# Patient Record
Sex: FEMALE | Race: WHITE | NOT HISPANIC OR LATINO | Employment: FULL TIME | URBAN - METROPOLITAN AREA
[De-identification: names, ages, dates, MRNs, and addresses within clinical notes are randomized per-mention and may not be internally consistent; named-entity substitution may affect disease eponyms.]

---

## 2017-01-24 RX ORDER — ALPRAZOLAM 0.5 MG/1
TABLET ORAL
Qty: 30 TABLET | Refills: 1 | OUTPATIENT
Start: 2017-01-24

## 2017-01-26 ENCOUNTER — OFFICE VISIT (OUTPATIENT)
Dept: FAMILY MEDICINE CLINIC | Facility: CLINIC | Age: 47
End: 2017-01-26

## 2017-01-26 VITALS
BODY MASS INDEX: 29.57 KG/M2 | WEIGHT: 184 LBS | OXYGEN SATURATION: 96 % | SYSTOLIC BLOOD PRESSURE: 110 MMHG | HEART RATE: 74 BPM | HEIGHT: 66 IN | DIASTOLIC BLOOD PRESSURE: 70 MMHG

## 2017-01-26 DIAGNOSIS — R25.1 TREMOR: Primary | ICD-10-CM

## 2017-01-26 PROCEDURE — 99213 OFFICE O/P EST LOW 20 MIN: CPT | Performed by: PHYSICIAN ASSISTANT

## 2017-01-26 RX ORDER — ESCITALOPRAM OXALATE 10 MG/1
10 TABLET ORAL DAILY
Qty: 30 TABLET | Refills: 0 | Status: SHIPPED | OUTPATIENT
Start: 2017-01-26 | End: 2017-02-19 | Stop reason: SDUPTHER

## 2017-01-26 NOTE — PROGRESS NOTES
Subjective   Eugenia Naik is a 46 y.o. female    History of Present Illness  Patient is a 46-year-old white female comes in for follow-up of essential tremors, she has been taking Prozac, Pridome, for tremors which is works for the last couple of years, most recently her tremors have gotten worse she states that she's noticed her head shaking more than normal.  She taking Xanax at night which has helped also for tremors.  She would like to change her Prozac which she states is not working as well as past and seems like the discussed things to make in her tremors worse she would like to try different SSRI.  Denies any SI/HI she is increased moodiness but Prozac seems to be making symptoms worse.  She does more head shakes in the morning and when she gets stressed, chronic problem that is not stable    The following portions of the patient's history were reviewed and updated as appropriate: allergies, current medications, past social history and problem list    Review of Systems   Constitutional: Negative for appetite change, diaphoresis, fatigue and unexpected weight change.   Eyes: Negative for visual disturbance.   Respiratory: Negative for chest tightness and shortness of breath.    Cardiovascular: Negative for chest pain, palpitations and leg swelling.   Gastrointestinal: Negative for diarrhea, nausea and vomiting.   Endocrine: Negative for polydipsia, polyphagia and polyuria.   Skin: Negative for color change.   Neurological: Positive for tremors. Negative for dizziness, weakness, light-headedness and numbness.       Objective     Vitals:    01/26/17 1140   BP: 110/70   Pulse: 74   SpO2: 96%       Physical Exam   Constitutional: She appears well-developed and well-nourished.   Neck: No JVD present.   Cardiovascular: Normal rate, regular rhythm, normal heart sounds, intact distal pulses and normal pulses.    No murmur heard.  Pulmonary/Chest: Effort normal and breath sounds normal. No respiratory distress.    Abdominal: Soft. Bowel sounds are normal. There is no hepatosplenomegaly. There is no tenderness.   Musculoskeletal: She exhibits no edema.   Skin: Skin is warm and dry.   Nursing note and vitals reviewed.      Assessment/Plan     Diagnoses and all orders for this visit:    Tremor  -     escitalopram (LEXAPRO) 10 MG tablet; Take 1 tablet by mouth Daily.     #1 DC Prozac    Start Lexapro 10 mg 1 by mouth everyday dispense 30×1 refill    She is to follow-up in 2 weeks to recheck    Refill Xanax 0.5 mg 1 by mouth daily at bedtime dispense 30×5 refills

## 2017-01-26 NOTE — MR AVS SNAPSHOT
Eugenia Naik   1/26/2017 11:15 AM   Office Visit    Dept Phone:  [phone]   Encounter #:  [id number]    Provider:  JAIME Garcia   Department:  [facility]                Your Full Care Plan              Today's Medication Changes          These changes are accurate as of: 1/26/17 12:13 PM.  If you have any questions, ask your nurse or doctor.               New Medication(s)Ordered:     escitalopram 10 MG tablet   Commonly known as:  LEXAPRO   Take 1 tablet by mouth Daily.   Started by:  JAIME Garcia         Medication(s)that have changed:     primidone 50 MG tablet   Commonly known as:  MYSOLINE   TAKE ONE TO TWO TABLETS BY MOUTH EVERY NIGHT AT BEDTIME   What changed:  Another medication with the same name was removed. Continue taking this medication, and follow the directions you see here.   Changed by:  Darrell Hunt MD         Stop taking medication(s)listed here:     azelastine 0.1 % nasal spray   Commonly known as:  ASTELIN   Stopped by:  JAIME Garcia           FLUoxetine 40 MG capsule   Commonly known as:  PROzac   Stopped by:  JAIME Garcia           HYDROcodone-acetaminophen 5-325 MG per tablet   Commonly known as:  NORCO   Stopped by:  JAIME Garcia           vitamin D 48407 UNITS capsule capsule   Commonly known as:  ERGOCALCIFEROL   Stopped by:  JAIME Garcia                Where to Get Your Medications      These medications were sent to [facility] [address] [place] [address] [address]  [phone] [facility] [phone] FX  [address] [place] [address]     Phone:  [phone]     escitalopram 10 MG tablet                  Your Updated Medication List          This list is accurate as of: 1/26/17 12:13 PM.  Always use your most recent med list.                ALPRAZolam 0.5 MG tablet   Commonly known as:  XANAX   TAKE ONE TABLET BY MOUTH  "EVERY NIGHT AT BEDTIME       atorvastatin 10 MG tablet   Commonly known as:  LIPITOR   Take 1 tablet by mouth daily.       escitalopram 10 MG tablet   Commonly known as:  LEXAPRO   Take 1 tablet by mouth Daily.       meclizine 25 MG tablet   Commonly known as:  ANTIVERT       primidone 50 MG tablet   Commonly known as:  MYSOLINE   TAKE ONE TO TWO TABLETS BY MOUTH EVERY NIGHT AT BEDTIME               Instructions     None    Patient Instructions History      Upcoming Appointments     Visit Type Date Time Department    OFFICE VISIT 1/26/2017 11:15 AM [facility]      [employer] Signup     Our records indicate that you have an active [employer] account.    You can view your After Visit Summary by going to [facility] and logging in with your [employer] username and password.  If you don't have a [employer] username and password but a parent or guardian has access to your record, the parent or guardian should login with their own [employer] username and password and access your record to view the After Visit Summary.    If you have questions, you can email [email or web address] or call [phone] to talk to our [employer] staff.  Remember, [employer] is NOT to be used for urgent needs.  For medical emergencies, dial 911.               Other Info from Your Visit           Allergies     No Known Allergies      Reason for Visit     Tremors     Depression           Vital Signs     Blood Pressure Pulse Height Weight Oxygen Saturation Body Mass Index    110/70 (BP Location: Right arm, Patient Position: Sitting) 74 66\" (167.6 cm) 184 lb (83.5 kg) 96% 29.7 kg/m2    Smoking Status                   Never Smoker             "

## 2017-02-19 DIAGNOSIS — R25.1 TREMOR: ICD-10-CM

## 2017-02-20 RX ORDER — ESCITALOPRAM OXALATE 10 MG/1
TABLET ORAL
Qty: 30 TABLET | Refills: 0 | Status: SHIPPED | OUTPATIENT
Start: 2017-02-20 | End: 2017-03-23 | Stop reason: SDUPTHER

## 2017-03-16 ENCOUNTER — TELEPHONE (OUTPATIENT)
Dept: FAMILY MEDICINE CLINIC | Facility: CLINIC | Age: 47
End: 2017-03-16

## 2017-03-16 NOTE — TELEPHONE ENCOUNTER
Patient said that the Lexapro that was given to help with her Tremors is not helping with her depression. She wanted to know if there was something else that she could take.

## 2017-03-17 RX ORDER — BUPROPION HYDROCHLORIDE 150 MG/1
150 TABLET, EXTENDED RELEASE ORAL 2 TIMES DAILY
Qty: 30 TABLET | Refills: 11 | Status: SHIPPED | OUTPATIENT
Start: 2017-03-17 | End: 2017-12-25 | Stop reason: SDUPTHER

## 2017-03-17 NOTE — TELEPHONE ENCOUNTER
Continue Lexapro, add Wellbutrin  sr  150mg  #30 , notify pt that i have called in, take both meds for 6 weeks , I will see back    Take wellbutrin first thing in AM

## 2017-03-23 DIAGNOSIS — R25.1 TREMOR: ICD-10-CM

## 2017-03-24 RX ORDER — ESCITALOPRAM OXALATE 10 MG/1
TABLET ORAL
Qty: 30 TABLET | Refills: 11 | Status: SHIPPED | OUTPATIENT
Start: 2017-03-24 | End: 2017-05-18 | Stop reason: SDUPTHER

## 2017-05-18 ENCOUNTER — OFFICE VISIT (OUTPATIENT)
Dept: FAMILY MEDICINE CLINIC | Facility: CLINIC | Age: 47
End: 2017-05-18

## 2017-05-18 VITALS
SYSTOLIC BLOOD PRESSURE: 108 MMHG | BODY MASS INDEX: 30.37 KG/M2 | HEART RATE: 91 BPM | OXYGEN SATURATION: 98 % | HEIGHT: 66 IN | DIASTOLIC BLOOD PRESSURE: 78 MMHG | WEIGHT: 189 LBS

## 2017-05-18 DIAGNOSIS — R25.1 TREMOR: Primary | ICD-10-CM

## 2017-05-18 PROCEDURE — 99213 OFFICE O/P EST LOW 20 MIN: CPT | Performed by: PHYSICIAN ASSISTANT

## 2017-05-18 RX ORDER — PROPRANOLOL HCL 60 MG
60 CAPSULE, EXTENDED RELEASE 24HR ORAL DAILY
Qty: 30 CAPSULE | Refills: 11 | Status: SHIPPED | OUTPATIENT
Start: 2017-05-18 | End: 2018-05-24 | Stop reason: SDUPTHER

## 2017-05-18 RX ORDER — ESCITALOPRAM OXALATE 20 MG/1
20 TABLET ORAL DAILY
Qty: 30 TABLET | Refills: 11 | Status: SHIPPED | OUTPATIENT
Start: 2017-05-18 | End: 2018-05-24 | Stop reason: SDUPTHER

## 2017-07-07 DIAGNOSIS — Z00.00 ANNUAL PHYSICAL EXAM: ICD-10-CM

## 2017-07-07 RX ORDER — ATORVASTATIN CALCIUM 10 MG/1
TABLET, FILM COATED ORAL
Qty: 90 TABLET | Refills: 2 | Status: SHIPPED | OUTPATIENT
Start: 2017-07-07 | End: 2018-06-19 | Stop reason: SDUPTHER

## 2017-07-14 RX ORDER — ALPRAZOLAM 0.5 MG/1
TABLET ORAL
Qty: 30 TABLET | Refills: 2 | OUTPATIENT
Start: 2017-07-14 | End: 2019-03-26

## 2017-10-11 ENCOUNTER — OFFICE VISIT (OUTPATIENT)
Dept: FAMILY MEDICINE CLINIC | Facility: CLINIC | Age: 47
End: 2017-10-11

## 2017-10-11 VITALS
RESPIRATION RATE: 14 BRPM | HEIGHT: 66 IN | BODY MASS INDEX: 30.37 KG/M2 | HEART RATE: 64 BPM | SYSTOLIC BLOOD PRESSURE: 104 MMHG | TEMPERATURE: 99.2 F | OXYGEN SATURATION: 98 % | WEIGHT: 189 LBS | DIASTOLIC BLOOD PRESSURE: 74 MMHG

## 2017-10-11 DIAGNOSIS — Z00.00 ROUTINE GENERAL MEDICAL EXAMINATION AT HEALTH CARE FACILITY: Primary | ICD-10-CM

## 2017-10-11 LAB
BILIRUB BLD-MCNC: NEGATIVE MG/DL
CLARITY, POC: CLEAR
COLOR UR: YELLOW
GLUCOSE UR STRIP-MCNC: NEGATIVE MG/DL
KETONES UR QL: NEGATIVE
LEUKOCYTE EST, POC: ABNORMAL
NITRITE UR-MCNC: NEGATIVE MG/ML
PH UR: 6.5 [PH] (ref 5–8)
PROT UR STRIP-MCNC: NEGATIVE MG/DL
RBC # UR STRIP: NEGATIVE /UL
SP GR UR: 1.01 (ref 1–1.03)
UROBILINOGEN UR QL: NORMAL

## 2017-10-11 PROCEDURE — 93000 ELECTROCARDIOGRAM COMPLETE: CPT | Performed by: PHYSICIAN ASSISTANT

## 2017-10-11 PROCEDURE — 81003 URINALYSIS AUTO W/O SCOPE: CPT | Performed by: PHYSICIAN ASSISTANT

## 2017-10-11 PROCEDURE — 99396 PREV VISIT EST AGE 40-64: CPT | Performed by: PHYSICIAN ASSISTANT

## 2017-10-11 NOTE — PROGRESS NOTES
Subjective   Eugenia Naik is a 47 y.o. female    History of Present Illness  Patient is a pleasant 47-year-old white female comes in for preventive medical examination no new problems or complaints does need refill on Xanax for chronic anxiety   The following portions of the patient's history were reviwed as appropriate: allergies, current medications, past social history and problem list    Review of Systems   Constitutional: Negative.    HENT: Negative.    Eyes: Negative.    Respiratory: Negative.    Cardiovascular: Negative.    Gastrointestinal: Negative.    Endocrine: Negative.    Genitourinary: Negative.    Musculoskeletal: Negative.    Skin: Negative.    Allergic/Immunologic: Negative.    Neurological: Negative.    Hematological: Negative.    Psychiatric/Behavioral: Negative.    All other systems reviewed and are negative.      Objective     Vitals:    10/11/17 1501   BP: 104/74   Pulse: 64   Resp: 14   Temp: 99.2 °F (37.3 °C)   SpO2: 98%       Physical Exam   Constitutional: She is oriented to person, place, and time. She appears well-developed and well-nourished.   HENT:   Head: Normocephalic and atraumatic.   Right Ear: External ear normal.   Left Ear: External ear normal.   Nose: Nose normal.   Mouth/Throat: Oropharynx is clear and moist.   Eyes: Conjunctivae and EOM are normal. Pupils are equal, round, and reactive to light.   Neck: Normal range of motion. Neck supple. No JVD present. Carotid bruit is not present. No thyromegaly present.   Cardiovascular: Normal rate, regular rhythm, normal heart sounds and intact distal pulses.    No murmur heard.  Pulmonary/Chest: Effort normal and breath sounds normal.   Abdominal: Soft. Bowel sounds are normal. She exhibits no mass. There is no tenderness.   Musculoskeletal: Normal range of motion. She exhibits no edema.   Lymphadenopathy:     She has no cervical adenopathy.   Neurological: She is alert and oriented to person, place, and time. She has normal  reflexes. No cranial nerve deficit.   Skin: Skin is warm and dry.   Psychiatric: She has a normal mood and affect.   Nursing note and vitals reviewed.      ECG 12 Lead  Date/Time: 10/11/2017 4:47 PM  Performed by: DOMINIQUE CHOPRA  Authorized by: DOMINIQUE CHOPRA   Comparison: not compared with previous ECG   Rhythm: sinus rhythm  Rate: normal  ST Segments: ST segments normal  T Waves: T waves normal  QRS axis: normal  Other: no other findings  Clinical impression: normal ECG  Comments: Follow up needed          Assessment/Plan     Diagnoses and all orders for this visit:    Routine general medical examination at health care facility  -     POCT urinalysis dipstick, automated  -     CBC & Differential  -     TSH  -     Lipid Panel  -     Vitamin D 1,25 Dihydroxy  -     Comprehensive Metabolic Panel  -     Ambulatory Referral For Screening Colonoscopy     refill Xanax 0.5 mg 1 by mouth twice a day when necessary anxiety dispense 60    Preventive medicine discussed, diet, exercise healthy living discussed discussed ways to lose weight such a low calorie low-carb diet along with exercising 3-5 times per week for 30 minutes

## 2017-10-12 ENCOUNTER — APPOINTMENT (OUTPATIENT)
Dept: LAB | Facility: HOSPITAL | Age: 47
End: 2017-10-12

## 2017-10-12 LAB
ALBUMIN SERPL-MCNC: 4.3 G/DL (ref 3.2–4.8)
ALBUMIN/GLOB SERPL: 1.9 G/DL (ref 1.5–2.5)
ALP SERPL-CCNC: 69 U/L (ref 25–100)
ALT SERPL W P-5'-P-CCNC: 15 U/L (ref 7–40)
ANION GAP SERPL CALCULATED.3IONS-SCNC: 6 MMOL/L (ref 3–11)
ARTICHOKE IGE QN: 109 MG/DL (ref 0–130)
AST SERPL-CCNC: 14 U/L (ref 0–33)
BASOPHILS # BLD AUTO: 0.03 10*3/MM3 (ref 0–0.2)
BASOPHILS NFR BLD AUTO: 0.4 % (ref 0–1)
BILIRUB SERPL-MCNC: 0.5 MG/DL (ref 0.3–1.2)
BUN BLD-MCNC: 13 MG/DL (ref 9–23)
BUN/CREAT SERPL: 14.4 (ref 7–25)
CALCIUM SPEC-SCNC: 9.3 MG/DL (ref 8.7–10.4)
CHLORIDE SERPL-SCNC: 105 MMOL/L (ref 99–109)
CHOLEST SERPL-MCNC: 173 MG/DL (ref 0–200)
CO2 SERPL-SCNC: 26 MMOL/L (ref 20–31)
CREAT BLD-MCNC: 0.9 MG/DL (ref 0.6–1.3)
DEPRECATED RDW RBC AUTO: 40.1 FL (ref 37–54)
EOSINOPHIL # BLD AUTO: 0.12 10*3/MM3 (ref 0–0.3)
EOSINOPHIL NFR BLD AUTO: 1.7 % (ref 0–3)
ERYTHROCYTE [DISTWIDTH] IN BLOOD BY AUTOMATED COUNT: 12.5 % (ref 11.3–14.5)
GFR SERPL CREATININE-BSD FRML MDRD: 67 ML/MIN/1.73
GLOBULIN UR ELPH-MCNC: 2.3 GM/DL
GLUCOSE BLD-MCNC: 100 MG/DL (ref 70–100)
HCT VFR BLD AUTO: 41.3 % (ref 34.5–44)
HDLC SERPL-MCNC: 57 MG/DL (ref 40–60)
HGB BLD-MCNC: 13.8 G/DL (ref 11.5–15.5)
IMM GRANULOCYTES # BLD: 0.02 10*3/MM3 (ref 0–0.03)
IMM GRANULOCYTES NFR BLD: 0.3 % (ref 0–0.6)
LYMPHOCYTES # BLD AUTO: 1 10*3/MM3 (ref 0.6–4.8)
LYMPHOCYTES NFR BLD AUTO: 14.2 % (ref 24–44)
MCH RBC QN AUTO: 29.6 PG (ref 27–31)
MCHC RBC AUTO-ENTMCNC: 33.4 G/DL (ref 32–36)
MCV RBC AUTO: 88.4 FL (ref 80–99)
MONOCYTES # BLD AUTO: 0.46 10*3/MM3 (ref 0–1)
MONOCYTES NFR BLD AUTO: 6.6 % (ref 0–12)
NEUTROPHILS # BLD AUTO: 5.39 10*3/MM3 (ref 1.5–8.3)
NEUTROPHILS NFR BLD AUTO: 76.8 % (ref 41–71)
PLATELET # BLD AUTO: 237 10*3/MM3 (ref 150–450)
PMV BLD AUTO: 9.5 FL (ref 6–12)
POTASSIUM BLD-SCNC: 4.2 MMOL/L (ref 3.5–5.5)
PROT SERPL-MCNC: 6.6 G/DL (ref 5.7–8.2)
RBC # BLD AUTO: 4.67 10*6/MM3 (ref 3.89–5.14)
SODIUM BLD-SCNC: 137 MMOL/L (ref 132–146)
TRIGL SERPL-MCNC: 126 MG/DL (ref 0–150)
TSH SERPL DL<=0.05 MIU/L-ACNC: 2.45 MIU/ML (ref 0.35–5.35)
WBC NRBC COR # BLD: 7.02 10*3/MM3 (ref 3.5–10.8)

## 2017-10-12 PROCEDURE — 80053 COMPREHEN METABOLIC PANEL: CPT | Performed by: PHYSICIAN ASSISTANT

## 2017-10-12 PROCEDURE — 85025 COMPLETE CBC W/AUTO DIFF WBC: CPT | Performed by: PHYSICIAN ASSISTANT

## 2017-10-12 PROCEDURE — 80061 LIPID PANEL: CPT | Performed by: PHYSICIAN ASSISTANT

## 2017-10-12 PROCEDURE — 82652 VIT D 1 25-DIHYDROXY: CPT | Performed by: PHYSICIAN ASSISTANT

## 2017-10-12 PROCEDURE — 84443 ASSAY THYROID STIM HORMONE: CPT | Performed by: PHYSICIAN ASSISTANT

## 2017-10-12 PROCEDURE — 36415 COLL VENOUS BLD VENIPUNCTURE: CPT | Performed by: PHYSICIAN ASSISTANT

## 2017-10-16 LAB — 1,25(OH)2D3 SERPL-MCNC: 51.6 PG/ML (ref 19.9–79.3)

## 2017-11-13 ENCOUNTER — TELEPHONE (OUTPATIENT)
Dept: FAMILY MEDICINE CLINIC | Facility: CLINIC | Age: 47
End: 2017-11-13

## 2017-11-13 NOTE — TELEPHONE ENCOUNTER
----- Message from Eugenia Naik sent at 11/12/2017  5:24 PM EST -----  Regarding: Non-Urgent Medical Question  Contact: [phone]  I have an appointment for a colonoscopy and was wondering if it was approved through insurance since I’m 47?  It is for a preventive screening.  Thanks,  Eugenia

## 2017-11-20 ENCOUNTER — OFFICE VISIT (OUTPATIENT)
Dept: FAMILY MEDICINE CLINIC | Facility: CLINIC | Age: 47
End: 2017-11-20

## 2017-11-20 VITALS
DIASTOLIC BLOOD PRESSURE: 74 MMHG | HEIGHT: 66 IN | OXYGEN SATURATION: 99 % | SYSTOLIC BLOOD PRESSURE: 106 MMHG | RESPIRATION RATE: 14 BRPM | HEART RATE: 71 BPM | BODY MASS INDEX: 30.44 KG/M2 | WEIGHT: 189.4 LBS

## 2017-11-20 DIAGNOSIS — M25.561 ACUTE PAIN OF RIGHT KNEE: Primary | ICD-10-CM

## 2017-11-20 PROCEDURE — 99213 OFFICE O/P EST LOW 20 MIN: CPT | Performed by: PHYSICIAN ASSISTANT

## 2017-11-20 NOTE — PROGRESS NOTES
Subjective   Eugenia Naik is a 47 y.o. female    History of Present Illness  Patient 47-year-old white female comes in complaining of right knee pain, states he's not better she's having pain when walking upstairs knee locks or swelling in the pain is 7 out of 10 throbbing pain in left knee pain with movement, pain has strengthening and right leg or knee.      The following portions of the patient's history were reviewed and updated as appropriate: allergies, current medications, past social history and problem list    Review of Systems   Constitutional: Negative for appetite change, diaphoresis, fatigue and unexpected weight change.   Eyes: Negative for visual disturbance.   Respiratory: Negative for cough, chest tightness and shortness of breath.    Cardiovascular: Negative for chest pain, palpitations and leg swelling.   Gastrointestinal: Negative for diarrhea, nausea and vomiting.   Endocrine: Negative for polydipsia, polyphagia and polyuria.   Musculoskeletal: Positive for arthralgias, back pain, gait problem and myalgias.   Skin: Negative for color change and rash.   Neurological: Negative for dizziness, syncope, weakness, light-headedness, numbness and headaches.       Objective     Vitals:    11/20/17 1502   BP: 106/74   Pulse: 71   Resp: 14   SpO2: 99%       Physical Exam   Constitutional: She appears well-developed and well-nourished.   Neck: Neck supple. No JVD present. No thyromegaly present.   Cardiovascular: Normal rate, regular rhythm, normal heart sounds and intact distal pulses.    Pulmonary/Chest: Effort normal and breath sounds normal.   Abdominal: Soft. Bowel sounds are normal.   Musculoskeletal: She exhibits no edema.        Right knee: Tenderness found. Medial joint line, lateral joint line, MCL, LCL and patellar tendon tenderness noted.   Lymphadenopathy:     She has no cervical adenopathy.   Neurological: No sensory deficit.   Skin: Skin is warm and dry. She is not diaphoretic.   Nursing  note and vitals reviewed.      Assessment/Plan     Diagnoses and all orders for this visit:    Acute pain of right knee  -     MRI Knee Right Without Contrast    Continue ibuprofen, for knee pain will follow-up after knee MRI is been completed

## 2017-12-05 ENCOUNTER — HOSPITAL ENCOUNTER (OUTPATIENT)
Dept: MRI IMAGING | Facility: HOSPITAL | Age: 47
Discharge: HOME | End: 2017-12-05
Admitting: PHYSICIAN ASSISTANT

## 2017-12-05 PROCEDURE — 73721 MRI JNT OF LWR EXTRE W/O DYE: CPT

## 2017-12-07 ENCOUNTER — TELEPHONE (OUTPATIENT)
Dept: FAMILY MEDICINE CLINIC | Facility: CLINIC | Age: 47
End: 2017-12-07

## 2017-12-08 ENCOUNTER — TRANSCRIBE ORDERS (OUTPATIENT)
Dept: FAMILY MEDICINE CLINIC | Facility: CLINIC | Age: 47
End: 2017-12-08

## 2017-12-08 ENCOUNTER — TELEPHONE (OUTPATIENT)
Dept: FAMILY MEDICINE CLINIC | Facility: CLINIC | Age: 47
End: 2017-12-08

## 2017-12-08 DIAGNOSIS — G89.29 CHRONIC PAIN OF RIGHT KNEE: Primary | ICD-10-CM

## 2017-12-08 DIAGNOSIS — M25.561 CHRONIC PAIN OF RIGHT KNEE: Primary | ICD-10-CM

## 2017-12-08 NOTE — TELEPHONE ENCOUNTER
Per donna, patient has severe arthritic changes in knee, but no tears. Patient would be beneficial in having a cortisone injection. Patient was notified of results and wants to be referred to Orthopedics

## 2017-12-14 ENCOUNTER — OFFICE VISIT (OUTPATIENT)
Dept: ORTHOPEDIC SURGERY | Facility: CLINIC | Age: 47
End: 2017-12-14

## 2017-12-14 VITALS
HEIGHT: 67 IN | SYSTOLIC BLOOD PRESSURE: 139 MMHG | BODY MASS INDEX: 29.41 KG/M2 | HEART RATE: 74 BPM | DIASTOLIC BLOOD PRESSURE: 81 MMHG | WEIGHT: 187.39 LBS

## 2017-12-14 DIAGNOSIS — M93.261 OSTEOCHONDRITIS DISSECANS OF RIGHT KNEE: ICD-10-CM

## 2017-12-14 DIAGNOSIS — M25.561 RIGHT KNEE PAIN, UNSPECIFIED CHRONICITY: ICD-10-CM

## 2017-12-14 DIAGNOSIS — M17.11 PRIMARY OSTEOARTHRITIS OF RIGHT KNEE: Primary | ICD-10-CM

## 2017-12-14 PROCEDURE — 20610 DRAIN/INJ JOINT/BURSA W/O US: CPT | Performed by: ORTHOPAEDIC SURGERY

## 2017-12-14 PROCEDURE — 99244 OFF/OP CNSLTJ NEW/EST MOD 40: CPT | Performed by: ORTHOPAEDIC SURGERY

## 2017-12-14 RX ORDER — TRIAMCINOLONE ACETONIDE 40 MG/ML
80 INJECTION, SUSPENSION INTRA-ARTICULAR; INTRAMUSCULAR
Status: COMPLETED | OUTPATIENT
Start: 2017-12-14 | End: 2017-12-14

## 2017-12-14 RX ORDER — MELOXICAM 15 MG/1
TABLET ORAL
Qty: 60 TABLET | Refills: 0 | Status: SHIPPED | OUTPATIENT
Start: 2017-12-14 | End: 2018-01-07 | Stop reason: SDUPTHER

## 2017-12-14 RX ORDER — LIDOCAINE HYDROCHLORIDE 10 MG/ML
3 INJECTION, SOLUTION INFILTRATION; PERINEURAL
Status: COMPLETED | OUTPATIENT
Start: 2017-12-14 | End: 2017-12-14

## 2017-12-14 RX ADMIN — LIDOCAINE HYDROCHLORIDE 3 ML: 10 INJECTION, SOLUTION INFILTRATION; PERINEURAL at 14:52

## 2017-12-14 RX ADMIN — TRIAMCINOLONE ACETONIDE 80 MG: 40 INJECTION, SUSPENSION INTRA-ARTICULAR; INTRAMUSCULAR at 14:52

## 2017-12-14 NOTE — PROGRESS NOTES
Procedure   Large Joint Arthrocentesis  Date/Time: 12/14/2017 2:52 PM  Consent given by: patient  Site marked: site marked  Timeout: Immediately prior to procedure a time out was called to verify the correct patient, procedure, equipment, support staff and site/side marked as required   Supporting Documentation  Indications: pain   Procedure Details  Location: knee - R knee  Preparation: Patient was prepped and draped in the usual sterile fashion  Needle size: 22 G  Approach: anterolateral  Medications administered: 3 mL lidocaine 1 %; 80 mg triamcinolone acetonide 40 MG/ML (3 cc Bupivicaine .25% NDC: [id number]; LOT: [id number]; EXP: 05/01/2019)  Patient tolerance: patient tolerated the procedure well with no immediate complications

## 2017-12-14 NOTE — PROGRESS NOTES
Orthopaedic Clinic Note: Knee New Patient    Chief Complaint   Patient presents with   • Right Knee - Pain        HPI    Consult from JAIME Garcia    Eugenia Naik is a 47 y.o. female who presents with right knee pain for 2 month(s). Onset Began after working out which she strained her hamstring muscle.  Afterwards she is had ongoing knee pain that has gotten worse with extended activity.  Twisting squatting standing for prolonged periods of time increases her pain.  She rates a 4/10 on the pain scale.  Elevation of the knee improves the pain.  Previous treatments have included ibuprofen and compression knee sleeve.  Despite several interventions, she is having limitations in daily activities.  She saw her primary care physician who ordered an x-ray and MRI and she was referred here for further evaluation and treatment recommendations.  She has a known history of a osteochondral lesion in the proximal medial tibia which was diagnosed 6 years ago.  Apart from that, she has no prior history of right knee pain or problems.    Past Medical History:   Diagnosis Date   • Acute maxillary sinusitis    • Arthralgia    • Colicky RUQ abdominal pain    • Dizziness and giddiness    • Essential tremor    • History of complications due to general anesthesia    • Lumbago    • Metatarsalgia, right foot    • Torres's neuroma of third interspace of right foot    • Pustule       Past Surgical History:   Procedure Laterality Date   • APPENDECTOMY     •  SECTION     • CHOLECYSTECTOMY     • TONSILLECTOMY        Family History   Problem Relation Age of Onset   • Stroke Mother    • Heart disease Mother    • Cancer Other    • Heart disease Other    • Depression Other    • Heart attack Other    • Depression Child    • Anesthesia problems Other    • Rheum arthritis Other    • Hypertension Other      Social History     Social History   • Marital status:      Spouse name: N/A   • Number of children: N/A   • Years  of education: N/A     Occupational History   • Not on file.     Social History Main Topics   • Smoking status: Never Smoker   • Smokeless tobacco: Never Used   • Alcohol use Yes      Comment: Occ   • Drug use: No   • Sexual activity: Defer     Other Topics Concern   • Not on file     Social History Narrative      Current Outpatient Prescriptions on File Prior to Visit   Medication Sig Dispense Refill   • ALPRAZolam (XANAX) 0.5 MG tablet Take one tablet at bedtime 30 tablet 2   • atorvastatin (LIPITOR) 10 MG tablet TAKE ONE TABLET BY MOUTH DAILY 90 tablet 2   • AZELASTINE HCL NA into each nostril As Needed.     • buPROPion SR (WELLBUTRIN SR) 150 MG 12 hr tablet Take 1 tablet by mouth 2 (Two) Times a Day. 30 tablet 11   • escitalopram (LEXAPRO) 20 MG tablet Take 1 tablet by mouth Daily. 30 tablet 11   • FLUoxetine (PROzac) 40 MG capsule Take 40 mg by mouth Daily.     • meclizine (ANTIVERT) 25 MG tablet Take 25 mg by mouth as needed for dizziness.     • primidone (MYSOLINE) 50 MG tablet Take  mg by mouth Daily.     • propranolol LA (INDERAL LA) 60 MG 24 hr capsule Take 1 capsule by mouth Daily. 30 capsule 11     No current facility-administered medications on file prior to visit.       No Known Allergies     Review of Systems   Constitutional: Positive for activity change.   HENT: Positive for postnasal drip and sinus pressure.    Eyes: Negative.    Respiratory: Negative.    Cardiovascular: Negative.    Gastrointestinal: Negative.    Endocrine: Negative.    Genitourinary: Negative.    Musculoskeletal: Positive for arthralgias (knee pain).        Joint Pain   Skin: Negative.    Allergic/Immunologic: Negative.    Neurological: Negative.    Hematological: Negative.    Psychiatric/Behavioral: Negative.         The following portions of the patient's history were reviewed and updated as appropriate: allergies, current medications, past family history, past medical history, past social history, past surgical history  "and problem list.    Physical Exam  Blood pressure 139/81, pulse 74, height 170 cm (66.93\"), weight 85 kg (187 lb 6.3 oz).    Body mass index is 29.41 kg/(m^2).    GENERAL APPEARANCE: awake, alert & oriented x 3, in no acute distress and well developed, well nourished  PSYCH: normal affect  LUNGS:  breathing nonlabored  EYES: sclera anicteric  CARDIOVASCULAR: palpable dorsalis pedis, palpable posterior tibial bilaterally. Capillary refill less than 2 seconds  EXTREMITIES: no clubbing, cyanosis  GAIT:  Normal            Right Lower Extremity Exam:   ----------  Hip Exam  ----------  FLEXION CONTRACTURE: None  FLEXION: 110 degrees  INTERNAL ROTATION: 20 degrees at 90 degrees of flexion   EXTERNAL ROTATION: 40 degrees at 90 degrees of flexion    PAIN WITH HIP MOTION: no  ----------  Knee Exam  ----------  ALIGNMENT: neutral, no varus or valgus deformity     RANGE OF MOTION:  Normal (0-130 degrees) with no extensor lag or flexion contracture  LIGAMENTOUS STABILITY:   stable to varus and valgus stress at 0 and 30 degrees without any evidence of laxity     STRENGTH:  5/5 knee flexion, extension. 5/5 ankle dorsiflexion and plantarflexion.     PAIN WITH PALPATION: Mild tenderness to palpation about medial patellar tendon and anterior medial knee, denies medial or lateral joint line pain  KNEE EFFUSION: Trace knee effusion  PAIN WITH KNEE ROM: Yes, anteriorly with knee flexion   PATELLAR CREPITUS: No  SPECIAL EXAM FINDINGS:  Negative patellar compression    REFLEXES:  PATELLAR 2+/4  ACHILLES 2+/4    CLONUS: negative  STRAIGHT LEG TEST:   negative    SENSATION TO LIGHT TOUCH:  DEEP PERONEAL/SUPERFICIAL PERONEAL/SURAL/SAPHENOUS/TIBIAL:   intact    EDEMA:  no  ERYTHEMA:  no  WOUNDS/INCISIONS: none, no overlying skin problems.      Left Lower Extremity Exam:   ----------  Hip Exam  ----------  FLEXION CONTRACTURE: None  FLEXION: 110 degrees  INTERNAL ROTATION: 20 degrees at 90 degrees of flexion   EXTERNAL ROTATION: 40 degrees " at 90 degrees of flexion    PAIN WITH HIP MOTION: no  ----------  Knee Exam  ----------  ALIGNMENT: neutral, no varus or valgus deformity     RANGE OF MOTION:  Normal (0-130 degrees) with no extensor lag or flexion contracture  LIGAMENTOUS STABILITY:   stable to varus and valgus stress at 0 and 30 degrees without any evidence of laxity     STRENGTH:  5/5 knee flexion, extension. 5/5 ankle dorsiflexion and plantarflexion.     PAIN WITH PALPATION: denies tenderness to palpation about the knee, denies medial or lateral joint line pain  KNEE EFFUSION: no  PAIN WITH KNEE ROM: no  PATELLAR CREPITUS: no  SPECIAL EXAM FINDINGS:  Negative patellar compression    REFLEXES:  PATELLAR 2+/4  ACHILLES 2+/4    CLONUS: negative  STRAIGHT LEG TEST:   negative    SENSATION TO LIGHT TOUCH:  DEEP PERONEAL/SUPERFICIAL PERONEAL/SURAL/SAPHENOUS/TIBIAL:   intact    EDEMA:  no  ERYTHEMA:  no  WOUNDS/INCISIONS: none, no overlying skin problems.    ______________________________________________________________________  ______________________________________________________________________    RADIOGRAPHIC FINDINGS:   Indication: Right knee pain    Comparison: Today's x-rays were compared to prior MRI from 12/5/17    Right knee(s) 4 views: minimal arthritic findings tricompartmentally.  There is evidence of a well circumscribed subchondral cyst in the medial proximal tibial plateau.  This correlates with the edematous area in the proximal tibia on MRI    MRI demonstrates mild degenerative changes of the medial compartment of the knee.  There is a subchondral cyst within the medial tibial plateau which is well circumscribed and likely chronic based on patient's history.  Medial compartment degeneration is also visualized.      Assessment/Plan:   Diagnosis Plan   1. Primary osteoarthritis of right knee  Large Joint Arthrocentesis    meloxicam (MOBIC) 15 MG tablet   2. Right knee pain, unspecified chronicity  XR Knee 4+ View Right   3.  Osteochondritis dissecans of right knee       Patient's symptoms are likely related to arthritis exacerbation versus worsening of the osteochondritis dissecans lesion.  Patient has had prior relief with ibuprofen.  I recommended prescription oral anti-inflammatory as well as a cortisone injection into the right knee.  If these interventions fail to adequately alleviate the symptoms the patient is experiencing, she may benefit from partial knee arthroplasty.  She expressed understanding and agreed to proceed with the injection and oral anti-inflammatory treatment.  I will see her back in 6 weeks for repeat evaluation.    Procedure Note:  I discussed with the patient the potential benefits of performing a therapeutic injection of the right knee as well as potential risks including but not limited to infection, swelling, pain, bleeding, bruising, nerve/vessel damage, skin color changes, transient elevation in blood glucose levels, and fat atrophy. After informed consent and after the area was prepped with alcohol, ethyl chloride was used to numb the skin. Via the superolateral approach, 3cc of 1% lidocaine, 3cc of 0.25% marcaine and 2 cc of 40mg/ml of Kenalog were injected into the right knee. The patient tolerated the procedure well. There were no complications. A sterile dressing was placed over the injection site.      Steve Mauro MD  12/14/17  3:01 PM

## 2017-12-26 RX ORDER — BUPROPION HYDROCHLORIDE 150 MG/1
TABLET, EXTENDED RELEASE ORAL
Qty: 60 TABLET | Refills: 5 | Status: SHIPPED | OUTPATIENT
Start: 2017-12-26 | End: 2018-06-30 | Stop reason: SDUPTHER

## 2018-01-07 DIAGNOSIS — M17.11 PRIMARY OSTEOARTHRITIS OF RIGHT KNEE: ICD-10-CM

## 2018-01-08 RX ORDER — MELOXICAM 15 MG/1
TABLET ORAL
Qty: 30 TABLET | Refills: 0 | Status: SHIPPED | OUTPATIENT
Start: 2018-01-08 | End: 2018-02-19 | Stop reason: SDUPTHER

## 2018-01-25 ENCOUNTER — OFFICE VISIT (OUTPATIENT)
Dept: ORTHOPEDIC SURGERY | Facility: CLINIC | Age: 48
End: 2018-01-25

## 2018-01-25 VITALS
HEART RATE: 63 BPM | HEIGHT: 66 IN | SYSTOLIC BLOOD PRESSURE: 139 MMHG | BODY MASS INDEX: 30.86 KG/M2 | WEIGHT: 192.02 LBS | DIASTOLIC BLOOD PRESSURE: 82 MMHG

## 2018-01-25 DIAGNOSIS — M17.11 PRIMARY OSTEOARTHRITIS OF RIGHT KNEE: ICD-10-CM

## 2018-01-25 DIAGNOSIS — M93.261 OSTEOCHONDRITIS DISSECANS OF RIGHT KNEE: ICD-10-CM

## 2018-01-25 DIAGNOSIS — M70.50 PES ANSERINE BURSITIS: Primary | ICD-10-CM

## 2018-01-25 PROCEDURE — 99213 OFFICE O/P EST LOW 20 MIN: CPT | Performed by: ORTHOPAEDIC SURGERY

## 2018-01-25 PROCEDURE — 20610 DRAIN/INJ JOINT/BURSA W/O US: CPT | Performed by: ORTHOPAEDIC SURGERY

## 2018-01-25 RX ORDER — TRIAMCINOLONE ACETONIDE 40 MG/ML
40 INJECTION, SUSPENSION INTRA-ARTICULAR; INTRAMUSCULAR
Status: COMPLETED | OUTPATIENT
Start: 2018-01-25 | End: 2018-01-25

## 2018-01-25 RX ORDER — LIDOCAINE HYDROCHLORIDE 10 MG/ML
2 INJECTION, SOLUTION INFILTRATION; PERINEURAL
Status: COMPLETED | OUTPATIENT
Start: 2018-01-25 | End: 2018-01-25

## 2018-01-25 RX ADMIN — TRIAMCINOLONE ACETONIDE 40 MG: 40 INJECTION, SUSPENSION INTRA-ARTICULAR; INTRAMUSCULAR at 15:40

## 2018-01-25 RX ADMIN — LIDOCAINE HYDROCHLORIDE 2 ML: 10 INJECTION, SOLUTION INFILTRATION; PERINEURAL at 15:40

## 2018-01-25 NOTE — PROGRESS NOTES
Procedure   Large Joint Arthrocentesis  Date/Time: 1/25/2018 3:40 PM  Consent given by: patient  Site marked: site marked  Timeout: Immediately prior to procedure a time out was called to verify the correct patient, procedure, equipment, support staff and site/side marked as required   Supporting Documentation  Indications: pain   Procedure Details  Location: knee - R knee  Preparation: Patient was prepped and draped in the usual sterile fashion  Approach: anteromedial  Medications administered: 2 mL lidocaine 1 %; 40 mg triamcinolone acetonide 40 MG/ML (2cc bupivicaine .25%, [id number], Lot [id number], exp [id number])  Patient tolerance: patient tolerated the procedure well with no immediate complications

## 2018-01-25 NOTE — PROGRESS NOTES
Orthopaedic Clinic Note: Knee Established Patient    Chief Complaint   Patient presents with   • Follow-up     6 weeks- Primary osteoarthritis of right knee        HPI    It has been 6  week(s) since Ms. Naik's last visit. She returns to clinic today for Follow-up of right knee pain.  She's been having some global pain localized to the right knee.  Previous MRI demonstrated osteochondritis dissecans lesion.  She received intra-articular steroid 6 weeks ago which improved her knee posterior knee pain significantly.  She is now having residual anterior medial proximal tibia pain overlying the pes bursa which she rates a 3/10 on the pain scale today.  Overall she would say her symptom improvement is roughly 40%.  She is angling with no assistive device today.  She continues take oral anti-inflammatories as needed.  She denies any fevers, chills, constitutional symptoms.  Past Medical History:   Diagnosis Date   • Acute maxillary sinusitis    • Arthralgia    • Colicky RUQ abdominal pain    • Dizziness and giddiness    • Essential tremor    • History of complications due to general anesthesia    • Lumbago    • Metatarsalgia, right foot    • Torres's neuroma of third interspace of right foot    • Pustule       Past Surgical History:   Procedure Laterality Date   • APPENDECTOMY     •  SECTION     • CHOLECYSTECTOMY     • TONSILLECTOMY        Family History   Problem Relation Age of Onset   • Stroke Mother    • Heart disease Mother    • Cancer Other    • Heart disease Other    • Depression Other    • Heart attack Other    • Depression Child    • Anesthesia problems Other    • Rheum arthritis Other    • Hypertension Other      Social History     Social History   • Marital status:      Spouse name: N/A   • Number of children: N/A   • Years of education: N/A     Occupational History   • Not on file.     Social History Main Topics   • Smoking status: Never Smoker   • Smokeless tobacco: Never Used   • Alcohol  "use Yes      Comment: Occ   • Drug use: No   • Sexual activity: Defer     Other Topics Concern   • Not on file     Social History Narrative      Current Outpatient Prescriptions on File Prior to Visit   Medication Sig Dispense Refill   • ALPRAZolam (XANAX) 0.5 MG tablet Take one tablet at bedtime 30 tablet 2   • atorvastatin (LIPITOR) 10 MG tablet TAKE ONE TABLET BY MOUTH DAILY 90 tablet 2   • AZELASTINE HCL NA into each nostril As Needed.     • buPROPion SR (WELLBUTRIN SR) 150 MG 12 hr tablet TAKE ONE TABLET BY MOUTH TWICE A DAY 60 tablet 5   • escitalopram (LEXAPRO) 20 MG tablet Take 1 tablet by mouth Daily. 30 tablet 11   • FLUoxetine (PROzac) 40 MG capsule Take 40 mg by mouth Daily.     • meclizine (ANTIVERT) 25 MG tablet Take 25 mg by mouth as needed for dizziness.     • meloxicam (MOBIC) 15 MG tablet TAKE ONE TABLET BY MOUTH DAILY WITH FOOD 30 tablet 0   • primidone (MYSOLINE) 50 MG tablet Take  mg by mouth Daily.     • propranolol LA (INDERAL LA) 60 MG 24 hr capsule Take 1 capsule by mouth Daily. 30 capsule 11     No current facility-administered medications on file prior to visit.       No Known Allergies     Review of Systems     Physical Exam  Blood pressure 139/82, pulse 63, height 168.5 cm (66.34\"), weight 87.1 kg (192 lb 0.3 oz).    Body mass index is 30.68 kg/(m^2).    GENERAL APPEARANCE: awake, alert, oriented, in no acute distress  LUNGS:  breathing nonlabored  EXTREMITIES: no clubbing, cyanosis  PERIPHERAL PULSES: palpable dorsalis pedis and posterior tibial pulses bilaterally.    GAIT:  Normal        ----------  Right Knee Exam:  ----------  ALIGNMENT: neutral  ----------  RANGE OF MOTION:  Normal (0-130 degrees) with no extensor lag or flexion contracture  LIGAMENTOUS STABILITY:   stable to varus and valgus stress at terminal extension and 30 degrees without any evidence of laxity  ----------  STRENGTH:  KNEE FLEXION 5/5  KNEE EXTENSION  5/5  ANKLE DORSIFLEXION  5/5  ANKLE PLANTARFLEXION  " 5/5  ----------  PAIN WITH PALPATION:medial joint line and pes bursa  KNEE EFFUSION: no  PAIN WITH KNEE ROM: yes, in pes bursa region   PATELLAR CREPITUS:  no  ----------  SENSATION TO LIGHT TOUCH:  DEEP PERONEAL/SUPERFICIAL PERONEAL/SURAL/SAPHENOUS/TIBIAL:    intact  ----------  EDEMA:  no  ERYTHEMA:    no  WOUNDS/INCISIONS:  no  _____________________________________________________________________  _____________________________________________________________________    RADIOGRAPHIC FINDINGS:   No new imaging today    Assessment/Plan:   Diagnosis Plan   1. Pes anserine bursitis  Large Joint Arthrocentesis   2. Primary osteoarthritis of right knee     3. Osteochondritis dissecans of right knee       Patient's intra-articular inflammation appears to resolve with oral anti-inflammatory as well as a prior intra-articular cortisone injection.  She has symptoms primarily today localized to the pes bursa which is an extra-articular structure.  I explained that an injection in this area could alleviate the symptoms.  She is agreeable to this plan.  I'll see her back in 6 weeks for repeat assessment.    Procedure Note:  I discussed with the patient the potential benefits of performing a therapeutic injection of the right knee pes bursa as well as potential risks including but not limited to infection, swelling, pain, bleeding, bruising, nerve/vessel damage, skin color changes, transient elevation in blood glucose levels, and fat atrophy. After informed consent and after the area was prepped with alcohol, ethyl chloride was used to numb the skin. Via the anteromedial approach, 2 cc of 1% lidocaine, 2 cc of 0.25% marcaine and 1 cc of 40mg/ml of Kenalog were injected into the right knee pes bursa. The patient tolerated the procedure well. There were no complications. A sterile dressing was placed over the injection site.    Patient received a percent pain relief after this injection.    Steve Mauro MD  01/25/18  3:45  PM

## 2018-02-19 DIAGNOSIS — M17.11 PRIMARY OSTEOARTHRITIS OF RIGHT KNEE: ICD-10-CM

## 2018-02-19 RX ORDER — MELOXICAM 15 MG/1
TABLET ORAL
Qty: 30 TABLET | Refills: 0 | Status: SHIPPED | OUTPATIENT
Start: 2018-02-19 | End: 2018-04-06 | Stop reason: SDUPTHER

## 2018-02-27 ENCOUNTER — OFFICE VISIT (OUTPATIENT)
Dept: FAMILY MEDICINE CLINIC | Facility: CLINIC | Age: 48
End: 2018-02-27

## 2018-02-27 ENCOUNTER — HOSPITAL ENCOUNTER (OUTPATIENT)
Dept: CT IMAGING | Facility: HOSPITAL | Age: 48
Discharge: HOME | End: 2018-02-27
Admitting: PHYSICIAN ASSISTANT

## 2018-02-27 VITALS
WEIGHT: 185.6 LBS | RESPIRATION RATE: 14 BRPM | SYSTOLIC BLOOD PRESSURE: 134 MMHG | HEART RATE: 86 BPM | OXYGEN SATURATION: 98 % | DIASTOLIC BLOOD PRESSURE: 84 MMHG | TEMPERATURE: 98.5 F | HEIGHT: 66 IN | BODY MASS INDEX: 29.83 KG/M2

## 2018-02-27 DIAGNOSIS — R10.30 LOWER ABDOMINAL PAIN: Primary | ICD-10-CM

## 2018-02-27 LAB
ALBUMIN SERPL-MCNC: 4.5 G/DL (ref 3.2–4.8)
ALBUMIN/GLOB SERPL: 2 G/DL (ref 1.5–2.5)
ALP SERPL-CCNC: 62 U/L (ref 25–100)
ALT SERPL W P-5'-P-CCNC: 29 U/L (ref 7–40)
ANION GAP SERPL CALCULATED.3IONS-SCNC: 8 MMOL/L (ref 3–11)
AST SERPL-CCNC: 18 U/L (ref 0–33)
BASOPHILS # BLD AUTO: 0.03 10*3/MM3 (ref 0–0.2)
BASOPHILS NFR BLD AUTO: 0.4 % (ref 0–1)
BILIRUB SERPL-MCNC: 0.7 MG/DL (ref 0.3–1.2)
BUN BLD-MCNC: 12 MG/DL (ref 9–23)
BUN/CREAT SERPL: 13.3 (ref 7–25)
CALCIUM SPEC-SCNC: 9.3 MG/DL (ref 8.7–10.4)
CHLORIDE SERPL-SCNC: 103 MMOL/L (ref 99–109)
CO2 SERPL-SCNC: 26 MMOL/L (ref 20–31)
CREAT BLD-MCNC: 0.9 MG/DL (ref 0.6–1.3)
DEPRECATED RDW RBC AUTO: 40.7 FL (ref 37–54)
EOSINOPHIL # BLD AUTO: 0.15 10*3/MM3 (ref 0–0.3)
EOSINOPHIL NFR BLD AUTO: 2.1 % (ref 0–3)
ERYTHROCYTE [DISTWIDTH] IN BLOOD BY AUTOMATED COUNT: 12.4 % (ref 11.3–14.5)
GFR SERPL CREATININE-BSD FRML MDRD: 67 ML/MIN/1.73
GLOBULIN UR ELPH-MCNC: 2.3 GM/DL
GLUCOSE BLD-MCNC: 98 MG/DL (ref 70–100)
HCT VFR BLD AUTO: 43.4 % (ref 34.5–44)
HGB BLD-MCNC: 14.3 G/DL (ref 11.5–15.5)
IMM GRANULOCYTES # BLD: 0.01 10*3/MM3 (ref 0–0.03)
IMM GRANULOCYTES NFR BLD: 0.1 % (ref 0–0.6)
LYMPHOCYTES # BLD AUTO: 1.19 10*3/MM3 (ref 0.6–4.8)
LYMPHOCYTES NFR BLD AUTO: 16.6 % (ref 24–44)
MCH RBC QN AUTO: 29.6 PG (ref 27–31)
MCHC RBC AUTO-ENTMCNC: 32.9 G/DL (ref 32–36)
MCV RBC AUTO: 89.9 FL (ref 80–99)
MONOCYTES # BLD AUTO: 0.49 10*3/MM3 (ref 0–1)
MONOCYTES NFR BLD AUTO: 6.8 % (ref 0–12)
NEUTROPHILS # BLD AUTO: 5.32 10*3/MM3 (ref 1.5–8.3)
NEUTROPHILS NFR BLD AUTO: 74 % (ref 41–71)
PLATELET # BLD AUTO: 254 10*3/MM3 (ref 150–450)
PMV BLD AUTO: 10.1 FL (ref 6–12)
POTASSIUM BLD-SCNC: 4.5 MMOL/L (ref 3.5–5.5)
PROT SERPL-MCNC: 6.8 G/DL (ref 5.7–8.2)
RBC # BLD AUTO: 4.83 10*6/MM3 (ref 3.89–5.14)
SODIUM BLD-SCNC: 137 MMOL/L (ref 132–146)
WBC NRBC COR # BLD: 7.19 10*3/MM3 (ref 3.5–10.8)

## 2018-02-27 PROCEDURE — 85025 COMPLETE CBC W/AUTO DIFF WBC: CPT | Performed by: PHYSICIAN ASSISTANT

## 2018-02-27 PROCEDURE — 74176 CT ABD & PELVIS W/O CONTRAST: CPT

## 2018-02-27 PROCEDURE — 99213 OFFICE O/P EST LOW 20 MIN: CPT | Performed by: PHYSICIAN ASSISTANT

## 2018-02-27 PROCEDURE — 80053 COMPREHEN METABOLIC PANEL: CPT | Performed by: PHYSICIAN ASSISTANT

## 2018-02-27 NOTE — PROGRESS NOTES
Subjective   Eugenia Naik is a 47 y.o. female  Abdominal Pain (Located in RUQ and radiates to back. Intermittent but pain is intense. Not tender to touch. Hurts to bend, twist but feels better when walking.)      History of Present Illness  Patient 47-year-old white female comes in complaining of right upper quadrant pain radiating to groin patient states it started 2 weeks ago and increasingly got worse she states it radiates into groin with 9 out of 10 pain concerned about possible kidney stone she's had her gallbladder removed without febrile.  No fever chills concerned   The following portions of the patient's history were reviewed and updated as appropriate: allergies, current medications, past social history and problem list    Review of Systems   Constitutional: Negative for appetite change, diaphoresis, fatigue, fever and unexpected weight change.   Eyes: Negative for visual disturbance.   Respiratory: Negative for cough, chest tightness and shortness of breath.    Cardiovascular: Negative for chest pain, palpitations and leg swelling.   Gastrointestinal: Positive for abdominal pain and constipation. Negative for diarrhea, nausea and vomiting.   Endocrine: Negative for polydipsia, polyphagia and polyuria.   Genitourinary: Negative for dysuria, frequency and hematuria.   Musculoskeletal: Negative for arthralgias and myalgias.   Skin: Negative for color change and rash.   Neurological: Negative for dizziness, syncope, weakness, light-headedness, numbness and headaches.       Objective     Vitals:    02/27/18 1301   BP: 134/84   Pulse: 86   Resp: 14   Temp: 98.5 °F (36.9 °C)   SpO2: 98%       Physical Exam   Constitutional: She appears well-developed and well-nourished.   Neck: Neck supple. No JVD present. No thyromegaly present.   Cardiovascular: Normal rate, regular rhythm, normal heart sounds, intact distal pulses and normal pulses.    No murmur heard.  Pulmonary/Chest: Effort normal and breath sounds  normal. No respiratory distress.   Abdominal: Soft. Bowel sounds are normal. There is no hepatosplenomegaly. There is no tenderness.   Musculoskeletal: She exhibits no edema.   Lymphadenopathy:     She has no cervical adenopathy.   Neurological: No sensory deficit.   Skin: Skin is warm and dry. She is not diaphoretic.   Nursing note and vitals reviewed.      Assessment/Plan     Diagnoses and all orders for this visit:    Lower abdominal pain  -     CBC & Differential  -     Comprehensive Metabolic Panel  -     CT Abdomen Pelvis Stone Protocol  -     CBC Auto Differential    Set up Ct scan of abdomen and pelvis to rule out renal stone.  Follow-up after CT scan

## 2018-02-28 ENCOUNTER — TELEPHONE (OUTPATIENT)
Dept: FAMILY MEDICINE CLINIC | Facility: CLINIC | Age: 48
End: 2018-02-28

## 2018-02-28 NOTE — TELEPHONE ENCOUNTER
----- Message from Elva Travis sent at 2/28/2018 11:12 AM EST -----  Contact: PT.  PT. HAS LEFT 2 MESSAGE RE. PAIN LEVEL & CT SCAN:  NEXT PLAN OF CARE.  PT. CAN BE REACHED @ #: [phone].

## 2018-02-28 NOTE — TELEPHONE ENCOUNTER
----- Message from Antonieta Sanderson sent at 2/28/2018  8:42 AM EST -----  Contact: PATIENT  SHE HAD MRI YESTERDAY, STILL IN A LOT OF PAIN, WOULD LIKE SOMEONE TO CALL HER WITH RESULTS AND WHAT SHE NEEDS TO DO NEXT. THANK YOU    [phone]

## 2018-03-13 ENCOUNTER — OFFICE VISIT (OUTPATIENT)
Dept: ORTHOPEDIC SURGERY | Facility: CLINIC | Age: 48
End: 2018-03-13

## 2018-03-13 VITALS
WEIGHT: 182.98 LBS | HEART RATE: 67 BPM | SYSTOLIC BLOOD PRESSURE: 142 MMHG | HEIGHT: 66 IN | DIASTOLIC BLOOD PRESSURE: 70 MMHG | BODY MASS INDEX: 29.41 KG/M2

## 2018-03-13 DIAGNOSIS — M93.261 OSTEOCHONDRITIS DISSECANS OF RIGHT KNEE: ICD-10-CM

## 2018-03-13 DIAGNOSIS — M17.11 PRIMARY OSTEOARTHRITIS OF RIGHT KNEE: Primary | ICD-10-CM

## 2018-03-13 PROCEDURE — 99212 OFFICE O/P EST SF 10 MIN: CPT | Performed by: ORTHOPAEDIC SURGERY

## 2018-03-13 NOTE — PROGRESS NOTES
Orthopaedic Clinic Note: Knee Established Patient    Chief Complaint   Patient presents with   • Follow-up     7 weeks - Pes anserine bursitis; Primary osteoarthritis of right knee ; Osteochondritis dissecans of right knee        HPI    It has been 7  week(s) since Ms. Naik's last visit. She returns to clinic today for Follow-up of right knee pain.  She received a cortisone injection into the right knee 3 months ago.  A pes bursa injection proximal to 7 weeks ago.  Overall her pain is significantly improved.  She rates her pain a 1/10 on the pain scale today.  She is ambulate with no assistive device.  She continues take oral meloxicam as needed for pain control.  She denies fevers, chills, constitutional symptoms.  Overall she is doing better.    Past Medical History:   Diagnosis Date   • Acute maxillary sinusitis    • Arthralgia    • Colicky RUQ abdominal pain    • Dizziness and giddiness    • Essential tremor    • History of complications due to general anesthesia    • Lumbago    • Metatarsalgia, right foot    • Torres's neuroma of third interspace of right foot    • Pustule       Past Surgical History:   Procedure Laterality Date   • APPENDECTOMY     •  SECTION     • CHOLECYSTECTOMY     • TONSILLECTOMY        Family History   Problem Relation Age of Onset   • Stroke Mother    • Heart disease Mother    • Cancer Other    • Heart disease Other    • Depression Other    • Heart attack Other    • Depression Child    • Anesthesia problems Other    • Rheum arthritis Other    • Hypertension Other      Social History     Social History   • Marital status:      Spouse name: N/A   • Number of children: N/A   • Years of education: N/A     Occupational History   • Not on file.     Social History Main Topics   • Smoking status: Never Smoker   • Smokeless tobacco: Never Used   • Alcohol use Yes      Comment: Occ   • Drug use: No   • Sexual activity: Defer     Other Topics Concern   • Not on file     Social  "History Narrative   • No narrative on file      Current Outpatient Prescriptions on File Prior to Visit   Medication Sig Dispense Refill   • ALPRAZolam (XANAX) 0.5 MG tablet Take one tablet at bedtime 30 tablet 2   • atorvastatin (LIPITOR) 10 MG tablet TAKE ONE TABLET BY MOUTH DAILY 90 tablet 2   • AZELASTINE HCL NA into each nostril As Needed.     • buPROPion SR (WELLBUTRIN SR) 150 MG 12 hr tablet TAKE ONE TABLET BY MOUTH TWICE A DAY 60 tablet 5   • escitalopram (LEXAPRO) 20 MG tablet Take 1 tablet by mouth Daily. 30 tablet 11   • FLUoxetine (PROzac) 40 MG capsule Take 40 mg by mouth Daily.     • meclizine (ANTIVERT) 25 MG tablet Take 25 mg by mouth as needed for dizziness.     • meloxicam (MOBIC) 15 MG tablet TAKE ONE TABLET BY MOUTH DAILY WITH FOOD 30 tablet 0   • primidone (MYSOLINE) 50 MG tablet Take  mg by mouth Daily.     • propranolol LA (INDERAL LA) 60 MG 24 hr capsule Take 1 capsule by mouth Daily. 30 capsule 11     No current facility-administered medications on file prior to visit.       No Known Allergies     Review of Systems     Physical Exam  Blood pressure 142/70, pulse 67, height 168.5 cm (66.34\"), weight 83 kg (182 lb 15.7 oz).    Body mass index is 29.23 kg/m².    GENERAL APPEARANCE: awake, alert, oriented, in no acute distress  LUNGS:  breathing nonlabored  EXTREMITIES: no clubbing, cyanosis  PERIPHERAL PULSES: palpable dorsalis pedis and posterior tibial pulses bilaterally.    GAIT:  Normal        ----------  Right Knee Exam:  ----------  ALIGNMENT: neutral  ----------  RANGE OF MOTION:  Normal (0-130 degrees) with no extensor lag or flexion contracture  LIGAMENTOUS STABILITY:   stable to varus and valgus stress at terminal extension and 30 degrees without any evidence of laxity  ----------  STRENGTH:  KNEE FLEXION 5/5  KNEE EXTENSION  5/5  ANKLE DORSIFLEXION  5/5  ANKLE PLANTARFLEXION  5/5  ----------  PAIN WITH PALPATION:medial joint line and pes bursa  KNEE EFFUSION: no  PAIN WITH KNEE " ROM: yes, in pes bursa region   PATELLAR CREPITUS:  no  ----------  SENSATION TO LIGHT TOUCH:  DEEP PERONEAL/SUPERFICIAL PERONEAL/SURAL/SAPHENOUS/TIBIAL:    intact  ----------  EDEMA:  no  ERYTHEMA:    no  WOUNDS/INCISIONS:  no  _____________________________________________________________________  _____________________________________________________________________    RADIOGRAPHIC FINDINGS:   No new imaging today    Assessment/Plan:   Diagnosis Plan   1. Primary osteoarthritis of right knee     2. Osteochondritis dissecans of right knee       At this point, she is doing well.  No further intervention is warranted.  She'll follow up on an as-needed basis.    Steve Mauro MD  03/13/18  4:18 PM

## 2018-03-25 ENCOUNTER — APPOINTMENT (OUTPATIENT)
Dept: GENERAL RADIOLOGY | Facility: HOSPITAL | Age: 48
End: 2018-03-25

## 2018-03-25 ENCOUNTER — HOSPITAL ENCOUNTER (EMERGENCY)
Facility: HOSPITAL | Age: 48
Discharge: HOME | End: 2018-03-25
Attending: EMERGENCY MEDICINE | Admitting: EMERGENCY MEDICINE

## 2018-03-25 VITALS
OXYGEN SATURATION: 100 % | HEIGHT: 66 IN | RESPIRATION RATE: 16 BRPM | BODY MASS INDEX: 28.93 KG/M2 | SYSTOLIC BLOOD PRESSURE: 132 MMHG | TEMPERATURE: 99.3 F | DIASTOLIC BLOOD PRESSURE: 82 MMHG | WEIGHT: 180 LBS | HEART RATE: 62 BPM

## 2018-03-25 DIAGNOSIS — R10.9 ABDOMINAL PAIN, UNSPECIFIED ABDOMINAL LOCATION: Primary | ICD-10-CM

## 2018-03-25 LAB
ALBUMIN SERPL-MCNC: 4.1 G/DL (ref 3.2–4.8)
ALBUMIN/GLOB SERPL: 1.9 G/DL (ref 1.5–2.5)
ALP SERPL-CCNC: 69 U/L (ref 25–100)
ALT SERPL W P-5'-P-CCNC: 15 U/L (ref 7–40)
ANION GAP SERPL CALCULATED.3IONS-SCNC: 4 MMOL/L (ref 3–11)
AST SERPL-CCNC: 13 U/L (ref 0–33)
B-HCG UR QL: NEGATIVE
BASOPHILS # BLD AUTO: 0.05 10*3/MM3 (ref 0–0.2)
BASOPHILS NFR BLD AUTO: 0.8 % (ref 0–1)
BILIRUB SERPL-MCNC: 0.5 MG/DL (ref 0.3–1.2)
BILIRUB UR QL STRIP: NEGATIVE
BUN BLD-MCNC: 15 MG/DL (ref 9–23)
BUN/CREAT SERPL: 16.7 (ref 7–25)
CALCIUM SPEC-SCNC: 8.8 MG/DL (ref 8.7–10.4)
CHLORIDE SERPL-SCNC: 105 MMOL/L (ref 99–109)
CLARITY UR: CLEAR
CO2 SERPL-SCNC: 27 MMOL/L (ref 20–31)
COLOR UR: YELLOW
CREAT BLD-MCNC: 0.9 MG/DL (ref 0.6–1.3)
D-LACTATE SERPL-SCNC: 0.9 MMOL/L (ref 0.5–2)
DEPRECATED RDW RBC AUTO: 40 FL (ref 37–54)
EOSINOPHIL # BLD AUTO: 0.09 10*3/MM3 (ref 0–0.3)
EOSINOPHIL NFR BLD AUTO: 1.5 % (ref 0–3)
ERYTHROCYTE [DISTWIDTH] IN BLOOD BY AUTOMATED COUNT: 12.3 % (ref 11.3–14.5)
GFR SERPL CREATININE-BSD FRML MDRD: 67 ML/MIN/1.73
GLOBULIN UR ELPH-MCNC: 2.2 GM/DL
GLUCOSE BLD-MCNC: 93 MG/DL (ref 70–100)
GLUCOSE UR STRIP-MCNC: NEGATIVE MG/DL
HCT VFR BLD AUTO: 41.9 % (ref 34.5–44)
HGB BLD-MCNC: 14.1 G/DL (ref 11.5–15.5)
HGB UR QL STRIP.AUTO: NEGATIVE
HOLD SPECIMEN: NORMAL
HOLD SPECIMEN: NORMAL
IMM GRANULOCYTES # BLD: 0.03 10*3/MM3 (ref 0–0.03)
IMM GRANULOCYTES NFR BLD: 0.5 % (ref 0–0.6)
INTERNAL NEGATIVE CONTROL: NEGATIVE
INTERNAL POSITIVE CONTROL: POSITIVE
KETONES UR QL STRIP: NEGATIVE
LEUKOCYTE ESTERASE UR QL STRIP.AUTO: NEGATIVE
LIPASE SERPL-CCNC: 33 U/L (ref 6–51)
LYMPHOCYTES # BLD AUTO: 0.87 10*3/MM3 (ref 0.6–4.8)
LYMPHOCYTES NFR BLD AUTO: 14.7 % (ref 24–44)
Lab: NORMAL
MCH RBC QN AUTO: 30 PG (ref 27–31)
MCHC RBC AUTO-ENTMCNC: 33.7 G/DL (ref 32–36)
MCV RBC AUTO: 89.1 FL (ref 80–99)
MONOCYTES # BLD AUTO: 0.54 10*3/MM3 (ref 0–1)
MONOCYTES NFR BLD AUTO: 9.2 % (ref 0–12)
NEUTROPHILS # BLD AUTO: 4.32 10*3/MM3 (ref 1.5–8.3)
NEUTROPHILS NFR BLD AUTO: 73.3 % (ref 41–71)
NITRITE UR QL STRIP: NEGATIVE
PH UR STRIP.AUTO: 8.5 [PH] (ref 5–8)
PLATELET # BLD AUTO: 261 10*3/MM3 (ref 150–450)
PMV BLD AUTO: 10 FL (ref 6–12)
POTASSIUM BLD-SCNC: 4.2 MMOL/L (ref 3.5–5.5)
PROT SERPL-MCNC: 6.3 G/DL (ref 5.7–8.2)
PROT UR QL STRIP: NEGATIVE
RBC # BLD AUTO: 4.7 10*6/MM3 (ref 3.89–5.14)
SODIUM BLD-SCNC: 136 MMOL/L (ref 132–146)
SP GR UR STRIP: 1.02 (ref 1–1.03)
TROPONIN I SERPL-MCNC: 0 NG/ML (ref 0–0.07)
UROBILINOGEN UR QL STRIP: ABNORMAL
WBC NRBC COR # BLD: 5.9 10*3/MM3 (ref 3.5–10.8)
WHOLE BLOOD HOLD SPECIMEN: NORMAL
WHOLE BLOOD HOLD SPECIMEN: NORMAL

## 2018-03-25 PROCEDURE — 93005 ELECTROCARDIOGRAM TRACING: CPT | Performed by: EMERGENCY MEDICINE

## 2018-03-25 PROCEDURE — 80053 COMPREHEN METABOLIC PANEL: CPT | Performed by: EMERGENCY MEDICINE

## 2018-03-25 PROCEDURE — 83690 ASSAY OF LIPASE: CPT | Performed by: EMERGENCY MEDICINE

## 2018-03-25 PROCEDURE — 83605 ASSAY OF LACTIC ACID: CPT | Performed by: EMERGENCY MEDICINE

## 2018-03-25 PROCEDURE — 84484 ASSAY OF TROPONIN QUANT: CPT

## 2018-03-25 PROCEDURE — 81003 URINALYSIS AUTO W/O SCOPE: CPT | Performed by: EMERGENCY MEDICINE

## 2018-03-25 PROCEDURE — 74018 RADEX ABDOMEN 1 VIEW: CPT

## 2018-03-25 PROCEDURE — 96374 THER/PROPH/DIAG INJ IV PUSH: CPT

## 2018-03-25 PROCEDURE — 85025 COMPLETE CBC W/AUTO DIFF WBC: CPT | Performed by: EMERGENCY MEDICINE

## 2018-03-25 PROCEDURE — 71045 X-RAY EXAM CHEST 1 VIEW: CPT

## 2018-03-25 PROCEDURE — 25010000002 ONDANSETRON PER 1 MG

## 2018-03-25 PROCEDURE — 99283 EMERGENCY DEPT VISIT LOW MDM: CPT

## 2018-03-25 RX ORDER — PROMETHAZINE HYDROCHLORIDE 25 MG/1
25 TABLET ORAL EVERY 4 HOURS PRN
Status: ON HOLD | COMMUNITY
End: 2018-12-05

## 2018-03-25 RX ORDER — DICYCLOMINE HCL 20 MG
20 TABLET ORAL EVERY 6 HOURS
Qty: 15 TABLET | Refills: 0 | Status: SHIPPED | OUTPATIENT
Start: 2018-03-25 | End: 2018-04-05 | Stop reason: HOSPADM

## 2018-03-25 RX ORDER — ONDANSETRON 2 MG/ML
INJECTION INTRAMUSCULAR; INTRAVENOUS
Status: COMPLETED
Start: 2018-03-25 | End: 2018-03-25

## 2018-03-25 RX ORDER — ONDANSETRON 2 MG/ML
4 INJECTION INTRAMUSCULAR; INTRAVENOUS ONCE
Status: DISCONTINUED | OUTPATIENT
Start: 2018-03-25 | End: 2018-03-25

## 2018-03-25 RX ORDER — OXYCODONE AND ACETAMINOPHEN 10; 325 MG/1; MG/1
1 TABLET ORAL EVERY 6 HOURS PRN
COMMUNITY
End: 2018-11-27

## 2018-03-25 RX ORDER — SODIUM CHLORIDE 0.9 % (FLUSH) 0.9 %
10 SYRINGE (ML) INJECTION AS NEEDED
Status: DISCONTINUED | OUTPATIENT
Start: 2018-03-25 | End: 2018-03-25 | Stop reason: HOSPADM

## 2018-03-25 RX ADMIN — ONDANSETRON 4 MG: 2 INJECTION INTRAMUSCULAR; INTRAVENOUS at 12:17

## 2018-03-25 RX ADMIN — SODIUM CHLORIDE 1000 ML: 9 INJECTION, SOLUTION INTRAVENOUS at 12:16

## 2018-03-28 ENCOUNTER — TELEPHONE (OUTPATIENT)
Dept: FAMILY MEDICINE CLINIC | Facility: CLINIC | Age: 48
End: 2018-03-28

## 2018-03-28 NOTE — TELEPHONE ENCOUNTER
----- Message from Patricia Valenzuela sent at 3/28/2018  4:06 PM EDT -----  Contact: PTS   PT WOULD LIKE A CALL BACK ASAP FROM [facility]...SAYS THIS IS REGARDING ABDOMINAL PAIN SHE HAS BEEN TREATED FOR/ TESTS RUN WITH NO RESOLUTION FOR MONTHS.

## 2018-03-29 ENCOUNTER — TELEPHONE (OUTPATIENT)
Dept: FAMILY MEDICINE CLINIC | Facility: CLINIC | Age: 48
End: 2018-03-29

## 2018-03-29 NOTE — TELEPHONE ENCOUNTER
----- Message from Eugenia Naik sent at 3/28/2018  6:45 PM EDT -----  Regarding: Visit Follow-Up Question  Ady  I have had 2 ct scans, vaginal ultrasound, kub, chest x-ray, various labs and a  colonoscopy. I have also visited Dr Guthrie and Dr Gilliland.   All results have been normal with the exception of the colonoscopy which showed 6 polyps and they were removed.  I also visited the ER on Sunday. All of this has resulted in no progress towards a reason for my pain. No one believes it is an ovarian vein stone  Through all of this I have had pain which is now increasing in frequency.  I tried to talk to Dr Gilliland about it today. He didn’t want to talk about my abd pain and acted like it  Couldn’t be gi   So he scheduled me for April 19th to discuss my abd pain.   So now I have to wait 3 more weeks with this pain. Can you help me so that I can get things moving faster please?  I am mentally and physically drained from the pain and the stress

## 2018-04-05 ENCOUNTER — OFFICE VISIT (OUTPATIENT)
Dept: GASTROENTEROLOGY | Facility: CLINIC | Age: 48
End: 2018-04-05

## 2018-04-05 VITALS
WEIGHT: 186 LBS | TEMPERATURE: 97.6 F | DIASTOLIC BLOOD PRESSURE: 100 MMHG | BODY MASS INDEX: 29.89 KG/M2 | SYSTOLIC BLOOD PRESSURE: 140 MMHG | HEART RATE: 71 BPM | OXYGEN SATURATION: 96 % | RESPIRATION RATE: 14 BRPM | HEIGHT: 66 IN

## 2018-04-05 DIAGNOSIS — R10.11 RIGHT UPPER QUADRANT ABDOMINAL PAIN: Primary | ICD-10-CM

## 2018-04-05 PROCEDURE — 99204 OFFICE O/P NEW MOD 45 MIN: CPT | Performed by: INTERNAL MEDICINE

## 2018-04-05 RX ORDER — OMEPRAZOLE 20 MG/1
40 CAPSULE, DELAYED RELEASE ORAL 2 TIMES DAILY
Qty: 180 CAPSULE | Refills: 3 | Status: SHIPPED | OUTPATIENT
Start: 2018-04-05 | End: 2018-10-28 | Stop reason: SDUPTHER

## 2018-04-05 NOTE — PROGRESS NOTES
PCP: JAIME Garcia    Chief Complaint   Patient presents with   • Abdominal Pain        History of Present Illness:   Eugenia Naik is a 47 y.o. female who presents to the GI clinic for assessment of ruq abdominal pain.  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 Pain is sharp. Wakes her up in the morning. Not associated with eating. Positional changes like laying back help but do not cure the pain. S/p colonoscopy with Dr. Gilliland a few weeks ago, she had a good bowel prep and 5 sessile polyps removed cold. Pain persisted despite bowel prep and starting linzess. Bowel movements once qod. She was started on narcotics prn by her pcp for this pain and she takes very infrequently.  NO vomiting. SHe has had multiple Ct a/p with one showing an umbilical vein stone. She and her  have lots of questions.    Past Medical History:   Diagnosis Date   • Acute maxillary sinusitis    • Arthralgia    • Colicky RUQ abdominal pain    • Dizziness and giddiness    • Essential tremor    • History of complications due to general anesthesia    • Lumbago    • Metatarsalgia, right foot    • Torres's neuroma of third interspace of right foot    • Pustule        Past Surgical History:   Procedure Laterality Date   • APPENDECTOMY     •  SECTION     • CHOLECYSTECTOMY     • COLONOSCOPY     • TONSILLECTOMY           Current Outpatient Prescriptions:   •  ALPRAZolam (XANAX) 0.5 MG tablet, Take one tablet at bedtime, Disp: 30 tablet, Rfl: 2  •  atorvastatin (LIPITOR) 10 MG tablet, TAKE ONE TABLET BY MOUTH DAILY, Disp: 90 tablet, Rfl: 2  •  AZELASTINE HCL NA, into each nostril As Needed., Disp: , Rfl:   •  buPROPion SR (WELLBUTRIN SR) 150 MG 12 hr tablet, TAKE ONE TABLET BY MOUTH TWICE A DAY, Disp: 60 tablet, Rfl: 5  •  escitalopram (LEXAPRO) 20 MG tablet, Take 1 tablet by mouth Daily., Disp: 30 tablet, Rfl: 11  •  meclizine  (ANTIVERT) 25 MG tablet, Take 25 mg by mouth as needed for dizziness., Disp: , Rfl:   •  meloxicam (MOBIC) 15 MG tablet, TAKE ONE TABLET BY MOUTH DAILY WITH FOOD, Disp: 30 tablet, Rfl: 0  •  oxyCODONE-acetaminophen (PERCOCET)  MG per tablet, Take 1 tablet by mouth Every 6 (Six) Hours As Needed for Moderate Pain ., Disp: , Rfl:   •  promethazine (PHENERGAN) 25 MG tablet, Take 25 mg by mouth Every 6 (Six) Hours As Needed for Nausea or Vomiting., Disp: , Rfl:   •  propranolol LA (INDERAL LA) 60 MG 24 hr capsule, Take 1 capsule by mouth Daily., Disp: 30 capsule, Rfl: 11    No Known Allergies    Family History   Problem Relation Age of Onset   • Stroke Mother    • Heart disease Mother    • Cancer Other    • Heart disease Other    • Depression Other    • Heart attack Other    • Depression Child    • Anesthesia problems Other    • Rheum arthritis Other    • Hypertension Other        Social History     Social History   • Marital status:      Spouse name: N/A   • Number of children: N/A   • Years of education: N/A     Occupational History   • Not on file.     Social History Main Topics   • Smoking status: Never Smoker   • Smokeless tobacco: Never Used   • Alcohol use Yes      Comment: Occ   • Drug use: No   • Sexual activity: Defer     Other Topics Concern   • Not on file     Social History Narrative   • No narrative on file       Review of Systems   Constitutional: Negative for fever.   Gastrointestinal: Positive for abdominal pain, constipation and nausea. Negative for diarrhea and vomiting.   Genitourinary: Negative for dysuria, frequency and hematuria.   Musculoskeletal: Negative for arthralgias and myalgias.   Neurological: Negative for headaches.     All other systems reviewed and are negative.    Vitals:    04/05/18 1301   BP: 140/100   Pulse: 71   Resp: 14   Temp: 97.6 °F (36.4 °C)   SpO2: 96%       Physical Exam  General Appearance:  Vitals as above. no acute distress  Head/face:  Normocephalic,  atraumatic  Eyes:   EOMI, no conjunctivitis or icterus   Nose/Sinuses:  Nares patent bilaterally without discharge or lesions  Mouth/Throat:  Posterior pharynx is pink without drainage or exudates;  dentition is in good condition and repair  Neck:  trachea is midline, no thyromegaly  Lungs:  Clear to auscultation bilaterally  Heart:  Regular rate and rhythm without murmur, gallop or rub  Abdomen:  Soft, non-tender to palpation, no obvious masses, bowel sounds positive in four, lap ccy scar quadrants; no abdominal bruits; no guarding or rebound tenderness  Neurologic:  Alert; no focal deficits; age appropriate behavior and speech  Psychiatric: mood and affect are congruent  Vascular: extremities without edema  Skin: no rash or cyanosis.      Assessment/Plan  1.) Persistent RUQ pain, chronic  The constant nature is very interesting and she is in noticeable discomfort.  I would say that her reported severity and constancy are typical of pancreatic type pain, abdominal wall pain, bowel perforation type, and occasionally very bad constipation. Narcotic bowel syndrome can act very similarly but she hasn't had the requisite narcotic dose and timeline doesn't fit.  Vague diagnoses such as abdominal migraine, RSD, median arcuate ligament syndrome may be entertained at a later date. SOD type III (normal cmp and bile duct diameter) is a nonexistent entity in my opinion. Her history and physical exam do not point strongly to any luminal GI problem.  She has had multiple ct scans, normal labs, good bowel regimen and I don't seen any evidence for alarm features such as pancreatic irritation.  Advised to minimize meloxicam if possible.    I have not dealt with umbilical vein thrombus before but her scan is without local inflammation or ureteral involvement.  I stated I would not think this should cause the degree of her discomfort but would defer to her gynecologist.  Should no intervention be planned, I am happy to pursue an  intense PPI regimen and then go to endoscopy.  An MRI may be more supportive of abdominal wall pain and/or functional type pain if negative.  I voiced my plan to her and the  and she understands the risk/benefit. Will have her scheduled for 6-8 week follow up to ensure progress.  Miguelangel Castro MD  4/5/2018  Answers for HPI/ROS submitted by the patient on 3/30/2018   Abdominal pain  Chronicity: new  Onset: more than 1 month ago  Onset quality: gradual  Frequency: constantly  Episode duration: 24 hours  Progression since onset: waxing and waning  Pain location: RUQ  Pain - numeric: 7/10  Pain quality: burning, sharp  Radiates to: RLQ, back  anorexia: Yes  belching: Yes  flatus: Yes  hematochezia: No  melena: No  weight loss: No  Aggravated by: certain positions  Relieved by: recumbency  Diagnostic workup: CT scan, lower endoscopy

## 2018-04-06 DIAGNOSIS — M17.11 PRIMARY OSTEOARTHRITIS OF RIGHT KNEE: ICD-10-CM

## 2018-04-06 RX ORDER — MELOXICAM 15 MG/1
TABLET ORAL
Qty: 30 TABLET | Refills: 0 | Status: SHIPPED | OUTPATIENT
Start: 2018-04-06 | End: 2018-07-22 | Stop reason: SDUPTHER

## 2018-04-11 ENCOUNTER — APPOINTMENT (OUTPATIENT)
Dept: PREADMISSION TESTING | Facility: HOSPITAL | Age: 48
End: 2018-04-11

## 2018-04-11 VITALS — HEIGHT: 66 IN | WEIGHT: 187.25 LBS | BODY MASS INDEX: 30.09 KG/M2

## 2018-04-11 LAB
ALBUMIN SERPL-MCNC: 4.4 G/DL (ref 3.2–4.8)
ALBUMIN/GLOB SERPL: 2 G/DL (ref 1.5–2.5)
ALP SERPL-CCNC: 72 U/L (ref 25–100)
ALT SERPL W P-5'-P-CCNC: 28 U/L (ref 7–40)
ANION GAP SERPL CALCULATED.3IONS-SCNC: 6 MMOL/L (ref 3–11)
AST SERPL-CCNC: 22 U/L (ref 0–33)
BILIRUB SERPL-MCNC: 0.3 MG/DL (ref 0.3–1.2)
BUN BLD-MCNC: 16 MG/DL (ref 9–23)
BUN/CREAT SERPL: 17.8 (ref 7–25)
CALCIUM SPEC-SCNC: 9.2 MG/DL (ref 8.7–10.4)
CHLORIDE SERPL-SCNC: 108 MMOL/L (ref 99–109)
CO2 SERPL-SCNC: 27 MMOL/L (ref 20–31)
CREAT BLD-MCNC: 0.9 MG/DL (ref 0.6–1.3)
DEPRECATED RDW RBC AUTO: 39.8 FL (ref 37–54)
ERYTHROCYTE [DISTWIDTH] IN BLOOD BY AUTOMATED COUNT: 12.2 % (ref 11.3–14.5)
GFR SERPL CREATININE-BSD FRML MDRD: 67 ML/MIN/1.73
GLOBULIN UR ELPH-MCNC: 2.2 GM/DL
GLUCOSE BLD-MCNC: 95 MG/DL (ref 70–100)
HBA1C MFR BLD: 5.7 % (ref 4.8–5.6)
HCT VFR BLD AUTO: 44.1 % (ref 34.5–44)
HGB BLD-MCNC: 14.8 G/DL (ref 11.5–15.5)
MCH RBC QN AUTO: 30 PG (ref 27–31)
MCHC RBC AUTO-ENTMCNC: 33.6 G/DL (ref 32–36)
MCV RBC AUTO: 89.5 FL (ref 80–99)
PLATELET # BLD AUTO: 245 10*3/MM3 (ref 150–450)
PMV BLD AUTO: 9.5 FL (ref 6–12)
POTASSIUM BLD-SCNC: 4.5 MMOL/L (ref 3.5–5.5)
PROT SERPL-MCNC: 6.6 G/DL (ref 5.7–8.2)
RBC # BLD AUTO: 4.93 10*6/MM3 (ref 3.89–5.14)
SODIUM BLD-SCNC: 141 MMOL/L (ref 132–146)
WBC NRBC COR # BLD: 6.62 10*3/MM3 (ref 3.5–10.8)

## 2018-04-11 PROCEDURE — 36415 COLL VENOUS BLD VENIPUNCTURE: CPT

## 2018-04-11 PROCEDURE — 80053 COMPREHEN METABOLIC PANEL: CPT | Performed by: OBSTETRICS & GYNECOLOGY

## 2018-04-11 PROCEDURE — 83036 HEMOGLOBIN GLYCOSYLATED A1C: CPT | Performed by: ANESTHESIOLOGY

## 2018-04-11 PROCEDURE — 85027 COMPLETE CBC AUTOMATED: CPT | Performed by: OBSTETRICS & GYNECOLOGY

## 2018-04-11 NOTE — DISCHARGE INSTRUCTIONS

## 2018-04-12 ENCOUNTER — HOSPITAL ENCOUNTER (OUTPATIENT)
Dept: MRI IMAGING | Facility: HOSPITAL | Age: 48
Discharge: HOME | End: 2018-04-12
Attending: INTERNAL MEDICINE

## 2018-04-16 ENCOUNTER — HOSPITAL ENCOUNTER (OUTPATIENT)
Dept: MRI IMAGING | Facility: HOSPITAL | Age: 48
Discharge: HOME | End: 2018-04-16
Attending: INTERNAL MEDICINE | Admitting: INTERNAL MEDICINE

## 2018-04-16 DIAGNOSIS — R10.11 RIGHT UPPER QUADRANT ABDOMINAL PAIN: ICD-10-CM

## 2018-04-16 PROCEDURE — A9577 INJ MULTIHANCE: HCPCS | Performed by: INTERNAL MEDICINE

## 2018-04-16 PROCEDURE — 0 GADOBENATE DIMEGLUMINE 529 MG/ML SOLUTION: Performed by: INTERNAL MEDICINE

## 2018-04-16 PROCEDURE — C8902 MRA W/O FOL W/CONT, ABD: HCPCS

## 2018-04-16 RX ADMIN — GADOBENATE DIMEGLUMINE 15 ML: 529 INJECTION, SOLUTION INTRAVENOUS at 10:00

## 2018-04-17 ENCOUNTER — ANESTHESIA (OUTPATIENT)
Dept: PERIOP | Facility: HOSPITAL | Age: 48
End: 2018-04-17

## 2018-04-17 ENCOUNTER — HOSPITAL ENCOUNTER (OUTPATIENT)
Facility: HOSPITAL | Age: 48
Discharge: HOME | End: 2018-04-18
Attending: OBSTETRICS & GYNECOLOGY | Admitting: OBSTETRICS & GYNECOLOGY

## 2018-04-17 ENCOUNTER — ANESTHESIA EVENT (OUTPATIENT)
Dept: PERIOP | Facility: HOSPITAL | Age: 48
End: 2018-04-17

## 2018-04-17 DIAGNOSIS — Z00.6 EXAMINATION FOR NORMAL COMPARISON FOR CLINICAL RESEARCH: ICD-10-CM

## 2018-04-17 DIAGNOSIS — R10.2 PELVIC PAIN: ICD-10-CM

## 2018-04-17 DIAGNOSIS — N89.8 SKIN TAG OF VAGINAL MUCOSA: ICD-10-CM

## 2018-04-17 PROBLEM — Z90.710 S/P HYSTERECTOMY: Status: ACTIVE | Noted: 2018-04-17

## 2018-04-17 LAB
B-HCG UR QL: NEGATIVE
INTERNAL NEGATIVE CONTROL: NORMAL
INTERNAL POSITIVE CONTROL: REACTIVE
Lab: NORMAL

## 2018-04-17 PROCEDURE — 25010000002 HYDROMORPHONE PER 4 MG: Performed by: OBSTETRICS & GYNECOLOGY

## 2018-04-17 PROCEDURE — 25010000002 PROMETHAZINE PER 50 MG: Performed by: NURSE ANESTHETIST, CERTIFIED REGISTERED

## 2018-04-17 PROCEDURE — 25010000002 HEPARIN (PORCINE) PER 1000 UNITS: Performed by: OBSTETRICS & GYNECOLOGY

## 2018-04-17 PROCEDURE — 25010000002 CEFOXITIN PER 1 G: Performed by: OBSTETRICS & GYNECOLOGY

## 2018-04-17 PROCEDURE — 25010000002 DEXAMETHASONE PER 1 MG: Performed by: NURSE ANESTHETIST, CERTIFIED REGISTERED

## 2018-04-17 PROCEDURE — 25010000002 NEOSTIGMINE 10 MG/10ML SOLUTION: Performed by: NURSE ANESTHETIST, CERTIFIED REGISTERED

## 2018-04-17 PROCEDURE — 25010000002 FENTANYL CITRATE (PF) 100 MCG/2ML SOLUTION: Performed by: NURSE ANESTHETIST, CERTIFIED REGISTERED

## 2018-04-17 PROCEDURE — 25010000002 ONDANSETRON PER 1 MG: Performed by: NURSE ANESTHETIST, CERTIFIED REGISTERED

## 2018-04-17 PROCEDURE — 25010000002 CEFOXITIN: Performed by: OBSTETRICS & GYNECOLOGY

## 2018-04-17 PROCEDURE — 25010000002 ONDANSETRON PER 1 MG: Performed by: OBSTETRICS & GYNECOLOGY

## 2018-04-17 PROCEDURE — 25010000002 PROPOFOL 1000 MG/ML EMULSION: Performed by: NURSE ANESTHETIST, CERTIFIED REGISTERED

## 2018-04-17 PROCEDURE — 88305 TISSUE EXAM BY PATHOLOGIST: CPT | Performed by: OBSTETRICS & GYNECOLOGY

## 2018-04-17 PROCEDURE — 25010000002 MIDAZOLAM PER 1 MG: Performed by: NURSE ANESTHETIST, CERTIFIED REGISTERED

## 2018-04-17 PROCEDURE — 88307 TISSUE EXAM BY PATHOLOGIST: CPT | Performed by: OBSTETRICS & GYNECOLOGY

## 2018-04-17 PROCEDURE — 25010000002 PROPOFOL 10 MG/ML EMULSION: Performed by: NURSE ANESTHETIST, CERTIFIED REGISTERED

## 2018-04-17 RX ORDER — ONDANSETRON 2 MG/ML
4 INJECTION INTRAMUSCULAR; INTRAVENOUS ONCE AS NEEDED
Status: DISCONTINUED | OUTPATIENT
Start: 2018-04-17 | End: 2018-04-17 | Stop reason: HOSPADM

## 2018-04-17 RX ORDER — ATORVASTATIN CALCIUM 10 MG/1
10 TABLET, FILM COATED ORAL NIGHTLY
Status: DISCONTINUED | OUTPATIENT
Start: 2018-04-17 | End: 2018-04-18 | Stop reason: HOSPADM

## 2018-04-17 RX ORDER — ESCITALOPRAM OXALATE 20 MG/1
20 TABLET ORAL DAILY
Status: DISCONTINUED | OUTPATIENT
Start: 2018-04-18 | End: 2018-04-18 | Stop reason: HOSPADM

## 2018-04-17 RX ORDER — BUPIVACAINE HYDROCHLORIDE 5 MG/ML
INJECTION, SOLUTION PERINEURAL AS NEEDED
Status: DISCONTINUED | OUTPATIENT
Start: 2018-04-17 | End: 2018-04-17 | Stop reason: HOSPADM

## 2018-04-17 RX ORDER — FENTANYL CITRATE 50 UG/ML
INJECTION, SOLUTION INTRAMUSCULAR; INTRAVENOUS AS NEEDED
Status: DISCONTINUED | OUTPATIENT
Start: 2018-04-17 | End: 2018-04-17 | Stop reason: SURG

## 2018-04-17 RX ORDER — HYDROMORPHONE HYDROCHLORIDE 1 MG/ML
0.5 INJECTION, SOLUTION INTRAMUSCULAR; INTRAVENOUS; SUBCUTANEOUS
Status: DISCONTINUED | OUTPATIENT
Start: 2018-04-17 | End: 2018-04-18 | Stop reason: HOSPADM

## 2018-04-17 RX ORDER — HYDROCODONE BITARTRATE AND ACETAMINOPHEN 7.5; 325 MG/1; MG/1
1 TABLET ORAL EVERY 4 HOURS PRN
Status: DISCONTINUED | OUTPATIENT
Start: 2018-04-17 | End: 2018-04-18 | Stop reason: HOSPADM

## 2018-04-17 RX ORDER — PROPOFOL 10 MG/ML
VIAL (ML) INTRAVENOUS AS NEEDED
Status: DISCONTINUED | OUTPATIENT
Start: 2018-04-17 | End: 2018-04-17 | Stop reason: SURG

## 2018-04-17 RX ORDER — FAMOTIDINE 20 MG/1
20 TABLET, FILM COATED ORAL ONCE
Status: COMPLETED | OUTPATIENT
Start: 2018-04-17 | End: 2018-04-17

## 2018-04-17 RX ORDER — HEPARIN SODIUM 5000 [USP'U]/ML
5000 INJECTION, SOLUTION INTRAVENOUS; SUBCUTANEOUS EVERY 12 HOURS SCHEDULED
Status: DISCONTINUED | OUTPATIENT
Start: 2018-04-18 | End: 2018-04-18 | Stop reason: HOSPADM

## 2018-04-17 RX ORDER — SODIUM CHLORIDE 9 MG/ML
100 INJECTION, SOLUTION INTRAVENOUS CONTINUOUS
Status: DISCONTINUED | OUTPATIENT
Start: 2018-04-17 | End: 2018-04-18 | Stop reason: HOSPADM

## 2018-04-17 RX ORDER — TEMAZEPAM 15 MG/1
15 CAPSULE ORAL NIGHTLY PRN
Status: DISCONTINUED | OUTPATIENT
Start: 2018-04-17 | End: 2018-04-18 | Stop reason: HOSPADM

## 2018-04-17 RX ORDER — KETOROLAC TROMETHAMINE 30 MG/ML
30 INJECTION, SOLUTION INTRAMUSCULAR; INTRAVENOUS EVERY 6 HOURS PRN
Status: DISCONTINUED | OUTPATIENT
Start: 2018-04-17 | End: 2018-04-18 | Stop reason: HOSPADM

## 2018-04-17 RX ORDER — MIDAZOLAM HYDROCHLORIDE 1 MG/ML
INJECTION INTRAMUSCULAR; INTRAVENOUS AS NEEDED
Status: DISCONTINUED | OUTPATIENT
Start: 2018-04-17 | End: 2018-04-17 | Stop reason: SURG

## 2018-04-17 RX ORDER — HYDROMORPHONE HYDROCHLORIDE 1 MG/ML
0.5 INJECTION, SOLUTION INTRAMUSCULAR; INTRAVENOUS; SUBCUTANEOUS
Status: DISCONTINUED | OUTPATIENT
Start: 2018-04-17 | End: 2018-04-17 | Stop reason: HOSPADM

## 2018-04-17 RX ORDER — ROCURONIUM BROMIDE 10 MG/ML
INJECTION, SOLUTION INTRAVENOUS AS NEEDED
Status: DISCONTINUED | OUTPATIENT
Start: 2018-04-17 | End: 2018-04-17 | Stop reason: SURG

## 2018-04-17 RX ORDER — ONDANSETRON 4 MG/1
4 TABLET, FILM COATED ORAL EVERY 6 HOURS PRN
Status: DISCONTINUED | OUTPATIENT
Start: 2018-04-17 | End: 2018-04-18 | Stop reason: HOSPADM

## 2018-04-17 RX ORDER — MAGNESIUM HYDROXIDE 1200 MG/15ML
LIQUID ORAL AS NEEDED
Status: DISCONTINUED | OUTPATIENT
Start: 2018-04-17 | End: 2018-04-17 | Stop reason: HOSPADM

## 2018-04-17 RX ORDER — LIDOCAINE HYDROCHLORIDE 10 MG/ML
0.5 INJECTION, SOLUTION EPIDURAL; INFILTRATION; INTRACAUDAL; PERINEURAL ONCE AS NEEDED
Status: COMPLETED | OUTPATIENT
Start: 2018-04-17 | End: 2018-04-17

## 2018-04-17 RX ORDER — PROMETHAZINE HYDROCHLORIDE 25 MG/ML
6.25 INJECTION, SOLUTION INTRAMUSCULAR; INTRAVENOUS ONCE AS NEEDED
Status: DISCONTINUED | OUTPATIENT
Start: 2018-04-17 | End: 2018-04-17 | Stop reason: HOSPADM

## 2018-04-17 RX ORDER — PROPRANOLOL HCL 60 MG
60 CAPSULE, EXTENDED RELEASE 24HR ORAL DAILY
Status: DISCONTINUED | OUTPATIENT
Start: 2018-04-18 | End: 2018-04-18 | Stop reason: HOSPADM

## 2018-04-17 RX ORDER — SODIUM CHLORIDE 0.9 % (FLUSH) 0.9 %
1-10 SYRINGE (ML) INJECTION AS NEEDED
Status: DISCONTINUED | OUTPATIENT
Start: 2018-04-17 | End: 2018-04-17 | Stop reason: HOSPADM

## 2018-04-17 RX ORDER — NALOXONE HCL 0.4 MG/ML
0.1 VIAL (ML) INJECTION
Status: DISCONTINUED | OUTPATIENT
Start: 2018-04-17 | End: 2018-04-18 | Stop reason: HOSPADM

## 2018-04-17 RX ORDER — FENTANYL CITRATE 50 UG/ML
50 INJECTION, SOLUTION INTRAMUSCULAR; INTRAVENOUS
Status: DISCONTINUED | OUTPATIENT
Start: 2018-04-17 | End: 2018-04-17 | Stop reason: HOSPADM

## 2018-04-17 RX ORDER — SODIUM CHLORIDE, SODIUM LACTATE, POTASSIUM CHLORIDE, CALCIUM CHLORIDE 600; 310; 30; 20 MG/100ML; MG/100ML; MG/100ML; MG/100ML
9 INJECTION, SOLUTION INTRAVENOUS CONTINUOUS
Status: DISCONTINUED | OUTPATIENT
Start: 2018-04-17 | End: 2018-04-18 | Stop reason: HOSPADM

## 2018-04-17 RX ORDER — LIDOCAINE HYDROCHLORIDE 10 MG/ML
INJECTION, SOLUTION EPIDURAL; INFILTRATION; INTRACAUDAL; PERINEURAL AS NEEDED
Status: DISCONTINUED | OUTPATIENT
Start: 2018-04-17 | End: 2018-04-17 | Stop reason: SURG

## 2018-04-17 RX ORDER — PROMETHAZINE HYDROCHLORIDE 25 MG/1
25 SUPPOSITORY RECTAL ONCE AS NEEDED
Status: DISCONTINUED | OUTPATIENT
Start: 2018-04-17 | End: 2018-04-17 | Stop reason: HOSPADM

## 2018-04-17 RX ORDER — PROMETHAZINE HYDROCHLORIDE 25 MG/ML
INJECTION, SOLUTION INTRAMUSCULAR; INTRAVENOUS AS NEEDED
Status: DISCONTINUED | OUTPATIENT
Start: 2018-04-17 | End: 2018-04-17 | Stop reason: SURG

## 2018-04-17 RX ORDER — HEPARIN SODIUM 5000 [USP'U]/ML
5000 INJECTION, SOLUTION INTRAVENOUS; SUBCUTANEOUS ONCE
Status: COMPLETED | OUTPATIENT
Start: 2018-04-17 | End: 2018-04-17

## 2018-04-17 RX ORDER — PANTOPRAZOLE SODIUM 40 MG/1
40 TABLET, DELAYED RELEASE ORAL
Status: DISCONTINUED | OUTPATIENT
Start: 2018-04-18 | End: 2018-04-18 | Stop reason: HOSPADM

## 2018-04-17 RX ORDER — ONDANSETRON 2 MG/ML
INJECTION INTRAMUSCULAR; INTRAVENOUS AS NEEDED
Status: DISCONTINUED | OUTPATIENT
Start: 2018-04-17 | End: 2018-04-17 | Stop reason: SURG

## 2018-04-17 RX ORDER — PROMETHAZINE HYDROCHLORIDE 25 MG/1
25 TABLET ORAL ONCE AS NEEDED
Status: DISCONTINUED | OUTPATIENT
Start: 2018-04-17 | End: 2018-04-17 | Stop reason: HOSPADM

## 2018-04-17 RX ORDER — SIMETHICONE 80 MG
80 TABLET,CHEWABLE ORAL 4 TIMES DAILY PRN
Status: DISCONTINUED | OUTPATIENT
Start: 2018-04-17 | End: 2018-04-18 | Stop reason: HOSPADM

## 2018-04-17 RX ORDER — BUPROPION HYDROCHLORIDE 150 MG/1
150 TABLET, EXTENDED RELEASE ORAL 2 TIMES DAILY
Status: DISCONTINUED | OUTPATIENT
Start: 2018-04-17 | End: 2018-04-18 | Stop reason: HOSPADM

## 2018-04-17 RX ORDER — MECLIZINE HYDROCHLORIDE 25 MG/1
25 TABLET ORAL AS NEEDED
Status: DISCONTINUED | OUTPATIENT
Start: 2018-04-17 | End: 2018-04-18 | Stop reason: HOSPADM

## 2018-04-17 RX ORDER — GLYCOPYRROLATE 0.2 MG/ML
INJECTION INTRAMUSCULAR; INTRAVENOUS AS NEEDED
Status: DISCONTINUED | OUTPATIENT
Start: 2018-04-17 | End: 2018-04-17 | Stop reason: SURG

## 2018-04-17 RX ORDER — NEOSTIGMINE METHYLSULFATE 1 MG/ML
INJECTION, SOLUTION INTRAVENOUS AS NEEDED
Status: DISCONTINUED | OUTPATIENT
Start: 2018-04-17 | End: 2018-04-17 | Stop reason: SURG

## 2018-04-17 RX ORDER — DEXAMETHASONE SODIUM PHOSPHATE 4 MG/ML
INJECTION, SOLUTION INTRA-ARTICULAR; INTRALESIONAL; INTRAMUSCULAR; INTRAVENOUS; SOFT TISSUE AS NEEDED
Status: DISCONTINUED | OUTPATIENT
Start: 2018-04-17 | End: 2018-04-17 | Stop reason: SURG

## 2018-04-17 RX ORDER — ONDANSETRON 2 MG/ML
4 INJECTION INTRAMUSCULAR; INTRAVENOUS EVERY 6 HOURS PRN
Status: DISCONTINUED | OUTPATIENT
Start: 2018-04-17 | End: 2018-04-18 | Stop reason: HOSPADM

## 2018-04-17 RX ADMIN — ROCURONIUM BROMIDE 50 MG: 10 SOLUTION INTRAVENOUS at 12:03

## 2018-04-17 RX ADMIN — PROPOFOL 25 MCG/KG/MIN: 10 INJECTION, EMULSION INTRAVENOUS at 12:10

## 2018-04-17 RX ADMIN — SODIUM CHLORIDE 100 ML/HR: 9 INJECTION, SOLUTION INTRAVENOUS at 16:19

## 2018-04-17 RX ADMIN — WATER 2 G: 1 INJECTION INTRAMUSCULAR; INTRAVENOUS; SUBCUTANEOUS at 20:22

## 2018-04-17 RX ADMIN — CEFOXITIN 2 G: 2 INJECTION, POWDER, FOR SOLUTION INTRAVENOUS at 12:06

## 2018-04-17 RX ADMIN — MIDAZOLAM HYDROCHLORIDE 2 MG: 1 INJECTION, SOLUTION INTRAMUSCULAR; INTRAVENOUS at 11:56

## 2018-04-17 RX ADMIN — GLYCOPYRROLATE 0.6 MG: 0.2 INJECTION, SOLUTION INTRAMUSCULAR; INTRAVENOUS at 13:46

## 2018-04-17 RX ADMIN — LIDOCAINE HYDROCHLORIDE 80 MG: 10 INJECTION, SOLUTION EPIDURAL; INFILTRATION; INTRACAUDAL; PERINEURAL at 12:03

## 2018-04-17 RX ADMIN — ATORVASTATIN CALCIUM 10 MG: 10 TABLET, FILM COATED ORAL at 20:21

## 2018-04-17 RX ADMIN — ROCURONIUM BROMIDE 5 MG: 10 SOLUTION INTRAVENOUS at 13:31

## 2018-04-17 RX ADMIN — HEPARIN SODIUM 5000 UNITS: 5000 INJECTION, SOLUTION INTRAVENOUS; SUBCUTANEOUS at 10:27

## 2018-04-17 RX ADMIN — ONDANSETRON 4 MG: 2 INJECTION INTRAMUSCULAR; INTRAVENOUS at 11:56

## 2018-04-17 RX ADMIN — PROMETHAZINE HYDROCHLORIDE 12.5 MG: 25 INJECTION INTRAMUSCULAR; INTRAVENOUS at 11:56

## 2018-04-17 RX ADMIN — FENTANYL CITRATE 50 MCG: 50 INJECTION, SOLUTION INTRAMUSCULAR; INTRAVENOUS at 12:03

## 2018-04-17 RX ADMIN — SODIUM CHLORIDE, POTASSIUM CHLORIDE, SODIUM LACTATE AND CALCIUM CHLORIDE 9 ML/HR: 600; 310; 30; 20 INJECTION, SOLUTION INTRAVENOUS at 10:25

## 2018-04-17 RX ADMIN — HYDROMORPHONE HYDROCHLORIDE 0.5 MG: 1 INJECTION, SOLUTION INTRAMUSCULAR; INTRAVENOUS; SUBCUTANEOUS at 16:19

## 2018-04-17 RX ADMIN — ONDANSETRON 4 MG: 2 INJECTION INTRAMUSCULAR; INTRAVENOUS at 16:19

## 2018-04-17 RX ADMIN — ROCURONIUM BROMIDE 5 MG: 10 SOLUTION INTRAVENOUS at 13:04

## 2018-04-17 RX ADMIN — NEOSTIGMINE METHYLSULFATE 4 MG: 1 INJECTION, SOLUTION INTRAVENOUS at 13:46

## 2018-04-17 RX ADMIN — DEXAMETHASONE SODIUM PHOSPHATE 4 MG: 4 INJECTION, SOLUTION INTRAMUSCULAR; INTRAVENOUS at 12:10

## 2018-04-17 RX ADMIN — FENTANYL CITRATE 50 MCG: 50 INJECTION, SOLUTION INTRAMUSCULAR; INTRAVENOUS at 12:30

## 2018-04-17 RX ADMIN — LIDOCAINE HYDROCHLORIDE 0.5 ML: 10 INJECTION, SOLUTION EPIDURAL; INFILTRATION; INTRACAUDAL; PERINEURAL at 10:30

## 2018-04-17 RX ADMIN — HYDROCODONE BITARTRATE AND ACETAMINOPHEN 1 TABLET: 7.5; 325 TABLET ORAL at 20:21

## 2018-04-17 RX ADMIN — SODIUM CHLORIDE, POTASSIUM CHLORIDE, SODIUM LACTATE AND CALCIUM CHLORIDE: 600; 310; 30; 20 INJECTION, SOLUTION INTRAVENOUS at 13:40

## 2018-04-17 RX ADMIN — PROPOFOL 160 MG: 10 INJECTION, EMULSION INTRAVENOUS at 12:03

## 2018-04-17 RX ADMIN — TEMAZEPAM 15 MG: 15 CAPSULE ORAL at 22:11

## 2018-04-17 RX ADMIN — ESTRADIOL 1 PATCH: 0.1 PATCH TRANSDERMAL at 16:46

## 2018-04-17 RX ADMIN — FAMOTIDINE 20 MG: 20 TABLET ORAL at 10:27

## 2018-04-17 RX ADMIN — SIMETHICONE 80 MG: 80 TABLET, CHEWABLE ORAL at 16:53

## 2018-04-17 NOTE — ANESTHESIA PREPROCEDURE EVALUATION
Anesthesia Evaluation     NPO Solid Status: > 8 hours  NPO Liquid Status: > 8 hours           Airway   Mallampati: II  TM distance: >3 FB  Possible difficult intubation  Dental - normal exam     Pulmonary     breath sounds clear to auscultation  (-) asthma, not a smoker  Cardiovascular     ECG reviewed  Rhythm: regular  Rate: normal    (-) pacemaker, hypertension, dysrhythmias, angina, murmur, cardiac stents, CABG      Neuro/Psych  (-) seizures, TIA  GI/Hepatic/Renal/Endo    (-) liver disease, no renal disease, diabetes    Musculoskeletal     Abdominal    Substance History      OB/GYN          Other                        Anesthesia Plan    ASA 2     general   (GA  TAPS  )  intravenous induction   Anesthetic plan and risks discussed with patient.    Plan discussed with CRNA.

## 2018-04-17 NOTE — ANESTHESIA PROCEDURE NOTES
Airway  Urgency: elective    Airway not difficult    General Information and Staff    Patient location during procedure: OR  CRNA: MADDIE MARTINEZ    Indications and Patient Condition  Indications for airway management: airway protection    Preoxygenated: yes  MILS not maintained throughout  Mask difficulty assessment: 1 - vent by mask    Final Airway Details  Final airway type: endotracheal airway      Successful airway: ETT  Cuffed: yes   Successful intubation technique: direct laryngoscopy  Endotracheal tube insertion site: oral  Blade: Jimmie  Blade size: #3  ETT size: 6.5 mm  Cormack-Lehane Classification: grade I - full view of glottis  Placement verified by: chest auscultation and capnometry   Inital cuff pressure (cm H2O): 5  Measured from: lips  ETT to lips (cm): 21  Number of attempts at approach: 1    Additional Comments  Pre-oxygenated to O2 sats of 100%.  Post-intubation:  Mucosa/dentiton unchanged. Negative epigastric sounds, Breath sound equal bilaterally with symmetric chest rise and fall

## 2018-04-17 NOTE — OP NOTE
GYN Operative Note    Patient Name, age and sex:  Eugenia Naik, 47 y.o., female  Procedure Date:  4/17/2018    Surgeon(s) and Role:     * Renetta Artis MD - Primary    Additional Staff:  Santi Bhat Medically necessary for assistance.    * No Diagnosis Codes entered *    * No Diagnosis Codes entered *    Procedure(s):  TOTAL LAPAROSCOPIC HYSTERECTOMY WITH DAVINCI WITH BILATERAL SALPINGO OOPHERECTOMY, SKIN TAG REMOVAL FROM INNER LABIA MINORA, CYSTO    Indications for Operation: 46 yo with intermittent pelvic pain, CT showed ovarian vein stone but also pt with prior endometrial ablation and adenomyosis.  Plan for removal of the tubes and ovaries and uterus for this abdominal/ pelvic pain.     Antibiotics:  cefoxitin (Mefoxin) ordered on call to OR    Anesthesia:  Type: General -   ASA:  II    Operative Findings:  Large boggy uterus of adenomyosis.  High in the right ovarian vein was a small firm calcification.  This was mobile. There was really no dilation of the veins below this or thrombosis.  Both ovaries and fallopian tubes were normal.  No adhesions.  Normal bladder.     Specimens Removed:    ID Type Source Tests Collected by Time   A :  Tissue Uterus with Cervix, Bilateral Tubes and Ovaries TISSUE PATHOLOGY EXAM Renetta Artis MD 4/17/2018 1315   B : vaginal lesion Tissue Vagina TISSUE PATHOLOGY EXAM Renetta Artis MD 4/17/2018 1317       Estimated Blood Loss:  min mL    Urine Output:  300 mL    Complications: none    Procedure Narrative:  The patient was taken to the operating room where general anesthesia was found be adequate.  She was then prepped and draped in the normal sterile fashion dorsal lithotomy position in the yellowfin stirrups.  The right labial skin tag was then removed the scalpel.  A single interrupted 4-0 Monocryl was used to reapproximate the skin.  The size was 1 cm.  The Flores catheter was placed.  The large V care manipulator was placed into the uterus.   Attention was then turned to the abdomen where a supraumbilical incision was made after injecting with local.  The abdominal wall tented up and the varies needle was placed.  Confirmed confirmed placement with the drop saline test and low patient pressure CO2.  The optical trocar was then used to enter into the abdominal cavity.  The first and second robotic ports were then placed and the assistant port in the right upper quadrant.  The robot was then docked.  The inspection of the abdomen and pelvis was then performed.  The stone on the right side was very very high above the pelvic brim therefore I could not excise it with our traditional removal of the right adnexa.  There did not seem to be any thrombosis were varicosities associated with this.  The course the ureter was identified as well.  The IP ligament was then identified on the right and left side this was coagulated with the PK dissector and transected across.  In this was dissected down to the level of the round ligament.  The round ligament was then  with the PK dissector and scissors.  The anterior and posterior peritoneum was then  and the bladder flap was then created.  The bladder was then dissected down to below the level of the V care ring.  The uterine vessels were skeletonized bilaterally.  These were then coagulated with the PK dissector.  Entry into the vaginal cuff was then performed with the monopolar scissors and the cervix was  from the vaginal cuff in a circumferential manner along the leading edge of the V care ring.  The uterus with bilateral tubes and ovaries were then removed through the vagina.  The vaginal cuff angles were then closed using interrupted 0 Vicryl figure-of-eight sutures.  The strata fixed barbed suture was used to close the cuff from the right and left side and tying this in the middle.  Excellent closure the vaginal cuff was noted.  Good hemostasis was obtained as well.  The robot was  undocked and the 12 mm assist ports were closed with the Marc Steven closure device with 0 Vicryl suture.  The trochars were then removed after irrigation was completed.  The skin incisions were closed with subcuticular Monocryl.  Skin affix to the skin.  A cystoscopy was then performed the bladder was filled with 300 cc sterile saline.  The bladder itself was intact with bilateral spillage of clear urine E flax from the ureteral orifice.  No bladder injury was noted.  Red rubber catheter was used to drain the bladder and the Flores was left out.  The patient was then taken out of Trendelenburg.  A final count was correct.  She was awoken from general anesthesia and taken to recovery room in stable condition.    Renetta Artis MD - 4/17/2018, 2:08 PM

## 2018-04-17 NOTE — PLAN OF CARE
Problem: Patient Care Overview  Goal: Discharge Needs Assessment  Outcome: Ongoing (interventions implemented as appropriate)   04/17/18 1511   Discharge Needs Assessment   Readmission Within the Last 30 Days no previous admission in last 30 days   Concerns to be Addressed no discharge needs identified   Patient/Family Anticipates Transition to home   Transportation Concerns car, none   Transportation Anticipated car, drives self;family or friend will provide   Anticipated Changes Related to Illness none   Equipment Needed After Discharge none   Disability   Equipment Currently Used at Home none       Problem: Hysterectomy (Adult)  Goal: Signs and Symptoms of Listed Potential Problems Will be Absent, Minimized or Managed (Hysterectomy)  Outcome: Ongoing (interventions implemented as appropriate)   04/17/18 1511   Goal/Outcome Evaluation   Problems Assessed (Hysterectomy) all   Problems Present (Hysterectomy) none     Goal: Anesthesia/Sedation Recovery  Outcome: Ongoing (interventions implemented as appropriate)   04/17/18 1511   Goal/Outcome Evaluation   Anesthesia/Sedation Recovery recovered to baseline

## 2018-04-17 NOTE — BRIEF OP NOTE
TOTAL LAPAROSCOPIC HYSTERECTOMY WITH DAVINCI ROBOT  Progress Note    Eugenia Naik  4/17/2018    Pre-op Diagnosis:   Adenomyosis  Abdominal pain  Labial skin tag       Post-Op Diagnosis Codes:   adenomysis  Abdominal pain  Labial skin tag    Procedure/CPT® Codes:      Procedure(s):  TOTAL LAPAROSCOPIC HYSTERECTOMY WITH DAVINCI WITH BILATERAL SALPINGO OOPHERECTOMY, SKIN TAG REMOVAL FROM INNER LABIA MINORA, CYSTO    Surgeon(s):  Renetta Artis MD    Anesthesia: General    Staff:   Circulator: Consuelo Segura RN; Timothy Plasencia RN; Andra Torres RN  Scrub Person: Antonieta Tucker; France Tran  Nursing Assistant: Anita Kim  Assistant: JAIME Frias; PARISH Jon    Estimated Blood Loss: 100 mL    Urine Voided: * No values recorded between 4/17/2018 11:55 AM and 4/17/2018  2:01 PM *    Specimens:                ID Type Source Tests Collected by Time   A :  Tissue Uterus with Cervix, Bilateral Tubes and Ovaries TISSUE PATHOLOGY EXAM Renetta Artis MD 4/17/2018 1315   B : vaginal lesion Tissue Vagina TISSUE PATHOLOGY EXAM Renetta Artis MD 4/17/2018 1317         Drains:      Findings: right inner labia, skin tag/ polyp size 1 cm.  Large uterus, a stone was seen high in the ovarian vein but otherwise normal ovaries.  Multiparous uterus. No pelvic adhesions. No endometriosis. Normal bladder with cystoscopy.     Complications: none      Renetta Artis MD     Date: 4/17/2018  Time: 2:05 PM

## 2018-04-17 NOTE — INTERVAL H&P NOTE
H&P reviewed. The patient was examined and there are no changes to the H&P.     /80 (BP Location: Right arm, Patient Position: Lying)   Pulse 73   Temp 98.3 °F (36.8 °C) (Temporal Artery )   Resp 16   LMP 04/07/2018   SpO2 98%   Breastfeeding? No   Agree with above.  Discussed with pt.  She does want to try total robotic hysterectomy.  May convert to ISSAC is needed. Also to BSO, removal bilateral tubes and ovaries.    Renetta Artis MD

## 2018-04-18 ENCOUNTER — APPOINTMENT (OUTPATIENT)
Dept: OTHER | Facility: HOSPITAL | Age: 48
End: 2018-04-18
Attending: INTERNAL MEDICINE

## 2018-04-18 VITALS
OXYGEN SATURATION: 99 % | DIASTOLIC BLOOD PRESSURE: 69 MMHG | HEART RATE: 77 BPM | BODY MASS INDEX: 30.09 KG/M2 | HEIGHT: 66 IN | SYSTOLIC BLOOD PRESSURE: 122 MMHG | TEMPERATURE: 98.7 F | RESPIRATION RATE: 18 BRPM | WEIGHT: 187.25 LBS

## 2018-04-18 PROBLEM — Z90.710 S/P HYSTERECTOMY: Status: RESOLVED | Noted: 2018-04-17 | Resolved: 2018-04-18

## 2018-04-18 LAB
ANION GAP SERPL CALCULATED.3IONS-SCNC: 6 MMOL/L (ref 3–11)
BUN BLD-MCNC: 7 MG/DL (ref 9–23)
BUN/CREAT SERPL: 6.4 (ref 7–25)
CALCIUM SPEC-SCNC: 8.6 MG/DL (ref 8.7–10.4)
CHLORIDE SERPL-SCNC: 106 MMOL/L (ref 99–109)
CO2 SERPL-SCNC: 25 MMOL/L (ref 20–31)
CREAT BLD-MCNC: 1.1 MG/DL (ref 0.6–1.3)
DEPRECATED RDW RBC AUTO: 39.1 FL (ref 37–54)
ERYTHROCYTE [DISTWIDTH] IN BLOOD BY AUTOMATED COUNT: 12.2 % (ref 11.3–14.5)
GFR SERPL CREATININE-BSD FRML MDRD: 53 ML/MIN/1.73
GLUCOSE BLD-MCNC: 96 MG/DL (ref 70–100)
HCT VFR BLD AUTO: 37.1 % (ref 34.5–44)
HGB BLD-MCNC: 12.5 G/DL (ref 11.5–15.5)
MCH RBC QN AUTO: 30 PG (ref 27–31)
MCHC RBC AUTO-ENTMCNC: 33.7 G/DL (ref 32–36)
MCV RBC AUTO: 89 FL (ref 80–99)
PLATELET # BLD AUTO: 190 10*3/MM3 (ref 150–450)
PMV BLD AUTO: 9.9 FL (ref 6–12)
POTASSIUM BLD-SCNC: 4.8 MMOL/L (ref 3.5–5.5)
RBC # BLD AUTO: 4.17 10*6/MM3 (ref 3.89–5.14)
SODIUM BLD-SCNC: 137 MMOL/L (ref 132–146)
WBC NRBC COR # BLD: 9.39 10*3/MM3 (ref 3.5–10.8)

## 2018-04-18 PROCEDURE — 85027 COMPLETE CBC AUTOMATED: CPT | Performed by: OBSTETRICS & GYNECOLOGY

## 2018-04-18 PROCEDURE — 25010000002 HYDROMORPHONE PER 4 MG: Performed by: OBSTETRICS & GYNECOLOGY

## 2018-04-18 PROCEDURE — 25010000002 CEFOXITIN PER 1 G: Performed by: OBSTETRICS & GYNECOLOGY

## 2018-04-18 PROCEDURE — 25010000002 HEPARIN (PORCINE) PER 1000 UNITS: Performed by: OBSTETRICS & GYNECOLOGY

## 2018-04-18 PROCEDURE — 80048 BASIC METABOLIC PNL TOTAL CA: CPT | Performed by: OBSTETRICS & GYNECOLOGY

## 2018-04-18 RX ADMIN — HYDROCODONE BITARTRATE AND ACETAMINOPHEN 1 TABLET: 7.5; 325 TABLET ORAL at 00:37

## 2018-04-18 RX ADMIN — ESCITALOPRAM OXALATE 20 MG: 20 TABLET ORAL at 08:35

## 2018-04-18 RX ADMIN — SODIUM CHLORIDE 100 ML/HR: 9 INJECTION, SOLUTION INTRAVENOUS at 00:37

## 2018-04-18 RX ADMIN — HEPARIN SODIUM 5000 UNITS: 5000 INJECTION, SOLUTION INTRAVENOUS; SUBCUTANEOUS at 08:36

## 2018-04-18 RX ADMIN — PANTOPRAZOLE SODIUM 40 MG: 40 TABLET, DELAYED RELEASE ORAL at 06:08

## 2018-04-18 RX ADMIN — WATER 2 G: 1 INJECTION INTRAMUSCULAR; INTRAVENOUS; SUBCUTANEOUS at 04:23

## 2018-04-18 RX ADMIN — HYDROMORPHONE HYDROCHLORIDE 0.5 MG: 1 INJECTION, SOLUTION INTRAMUSCULAR; INTRAVENOUS; SUBCUTANEOUS at 03:03

## 2018-04-18 RX ADMIN — HYDROCODONE BITARTRATE AND ACETAMINOPHEN 1 TABLET: 7.5; 325 TABLET ORAL at 08:36

## 2018-04-18 RX ADMIN — BUPROPION HYDROCHLORIDE 150 MG: 150 TABLET, EXTENDED RELEASE ORAL at 08:35

## 2018-04-18 RX ADMIN — PROPRANOLOL HYDROCHLORIDE 60 MG: 60 CAPSULE, EXTENDED RELEASE ORAL at 08:35

## 2018-04-18 NOTE — PROGRESS NOTES
Discharge Planning Assessment  [facility]     Patient Name: Eugenia Naik  MRN: [id number]  Today's Date: 4/18/2018    Admit Date: 4/17/2018          Discharge Needs Assessment     Row Name 04/18/18 1104       Living Environment    Lives With spouse   Her children are out of the home    Name(s) of Who Lives With Patient spouse-Michael    Current Living Arrangements home/apartment/condo   Multi level home, Eugenia tells me the stairs will not be a concern for her. She has about 14.     Primary Care Provided by self    Provides Primary Care For no one    Family Caregiver if Needed spouse    Quality of Family Relationships Supportive;involved;helpful.  Her elderly mother lives part of the time with her. Eugenia had questions about the [employer] Monitoring system. I gave her some downloaded information and the contact phone number.     Able to Return to Prior Arrangements yes       Resource/Environmental Concerns    Resource/Environmental Concerns none    Transportation Concerns car, none       Transition Planning    Patient/Family Anticipates Transition to home with spouse    Patient/Family Anticipated Services at Transition none    Transportation Anticipated family or friend will provide       Discharge Needs Assessment    Readmission Within the Last 30 Days no previous admission in last 30 days    Concerns to be Addressed no discharge needs identified    Equipment Currently Used at Home none    Anticipated Changes Related to Illness other (see comments)   Post surgical recuperation    Equipment Needed After Discharge none    Discharge Coordination/Progress [phone].   PCP is Jeff Montesinos            Discharge Plan     Row Name 04/18/18 1109       Plan    Plan Home with     Patient/Family in Agreement with Plan yes    Plan Comments I spoke with Eugenia, a 47 year old  lady from [place]. Eugenia has no discharge planning needs identified.     Row Name 04/18/18 0901       Plan    Final Discharge  Disposition Code 01 - home or self-care        Destination     No service coordination in this encounter.      Durable Medical Equipment     No service coordination in this encounter.      Dialysis/Infusion     No service coordination in this encounter.      Home Medical Care     No service coordination in this encounter.      Social Care     No service coordination in this encounter.        Expected Discharge Date and Time     Expected Discharge Date Expected Discharge Time    Apr 18, 2018               Demographic Summary     Row Name 04/18/18 1102       General Information    Referral Source admission list    Preferred Language English       Contact Information    Permission Granted to Share Info With     Contact Information Obtained for             Functional Status     Row Name 04/18/18 1102       Functional Status    Usual Activity Tolerance excellent    Current Activity Tolerance excellent       Functional Status, IADL    Medications independent    Meal Preparation independent    Housekeeping independent    Laundry independent    Shopping independent       Employment/    Employment Status employed full time   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 I gave Eugenia information about the FMLA/Short term disability. She has already been working with the FMLA.             Psychosocial    No documentation.           Abuse/Neglect    No documentation.           Legal    No documentation.           Substance Abuse    No documentation.           Patient Forms    No documentation.         Mery Sánchez RN

## 2018-04-18 NOTE — PROGRESS NOTES
Surgery Post-op Note      * No Diagnosis Codes entered *    * No Diagnosis Codes entered *    Procedure(s):  TOTAL LAPAROSCOPIC HYSTERECTOMY WITH DAVINCI WITH BILATERAL SALPINGO OOPHERECTOMY, SKIN TAG REMOVAL FROM INNER LABIA MINORA, CYSTO    Subjective     Pain controlled, no n/v , ambulating voiding well. Min bleeding. Up all night going to the bathroom but now is much better.  Diuresis almost over.     Objective   Physical Exam  Vitals:    04/18/18 0700   BP: 122/69   Pulse: 77   Resp: 18   Temp: 98.7 °F (37.1 °C)   SpO2: 99%     General: awake, alert, comfortable    Abdominal exam, def. Pt with no distress.    Labs:  Lab Results (last 24 hours)     Procedure Component Value Units Date/Time    Basic Metabolic Panel [[id number]]  (Abnormal) Collected:  04/18/18 0452    Specimen:  Blood Updated:  04/18/18 0550     Glucose 96 mg/dL      BUN 7 (L) mg/dL      Creatinine 1.10 mg/dL      Sodium 137 mmol/L      Potassium 4.8 mmol/L      Chloride 106 mmol/L      CO2 25.0 mmol/L      Calcium 8.6 (L) mg/dL      eGFR Non African Amer 53 (L) mL/min/1.73      BUN/Creatinine Ratio 6.4 (L)     Anion Gap 6.0 mmol/L     Narrative:       National Kidney [employer] Guidelines    Stage     Description        GFR  1         Normal or High     90+  2         Mild decrease      60-89  3         Moderate decrease  30-59  4         Severe decrease    15-29  5         Kidney failure     <15    CBC (No Diff) [[id number]]  (Normal) Collected:  04/18/18 0452    Specimen:  Blood Updated:  04/18/18 0519     WBC 9.39 10*3/mm3      RBC 4.17 10*6/mm3      Hemoglobin 12.5 g/dL      Hematocrit 37.1 %      MCV 89.0 fL      MCH 30.0 pg      MCHC 33.7 g/dL      RDW 12.2 %      RDW-SD 39.1 fl      MPV 9.9 fL      Platelets 190 10*3/mm3     Tissue Pathology Exam [[id number]] Collected:  04/17/18 1315    Specimen:  Tissue from Uterus with Cervix, Bilateral Tubes and Ovaries; Tissue from Vagina Updated:  04/17/18 1424    POC Urine Pregnancy Test [[id number]]   "(Normal) Collected:  04/17/18 1002    Specimen:  Urine Updated:  04/17/18 1034     HCG, Urine, QL Negative     Lot Number [id number]     Comment: exp 11/19        Internal Positive Control Reactive     Internal Negative Control Test not performed              Intake and Output:    Intake/Output Summary (Last 24 hours) at 04/18/18 0959  Last data filed at 04/18/18 0900   Gross per 24 hour   Intake             2772 ml   Output             2850 ml   Net              -78 ml       Assessment     The patient is a 47 y.o. female 1 Day Post-Op after TLH BSO    Plan     · Explained findings and surgical procedure.  The ENTIRE ovary was removed. Her  told her wrong information. Now only the \"ovarian stone\" is very high up in the ovarian vein but this should not be a problem since the end organ is removed and the vein should collapse.   · Start climara patch, hrt, rx sent to her home pharmacy  · Pelvic rest, lifting restriction  · Follow up next week.     Renetta Artis MD  04/18/18  9:59 AM          "

## 2018-04-19 LAB
CYTO UR: NORMAL
LAB AP CASE REPORT: NORMAL
LAB AP CLINICAL INFORMATION: NORMAL
Lab: NORMAL
PATH REPORT.FINAL DX SPEC: NORMAL
PATH REPORT.GROSS SPEC: NORMAL

## 2018-05-15 ENCOUNTER — OFFICE VISIT (OUTPATIENT)
Dept: GASTROENTEROLOGY | Facility: CLINIC | Age: 48
End: 2018-05-15

## 2018-05-15 VITALS
OXYGEN SATURATION: 99 % | RESPIRATION RATE: 14 BRPM | WEIGHT: 189.6 LBS | HEART RATE: 76 BPM | SYSTOLIC BLOOD PRESSURE: 128 MMHG | BODY MASS INDEX: 30.47 KG/M2 | TEMPERATURE: 98.7 F | HEIGHT: 66 IN | DIASTOLIC BLOOD PRESSURE: 88 MMHG

## 2018-05-15 DIAGNOSIS — R10.9 ABDOMINAL PAIN, UNSPECIFIED ABDOMINAL LOCATION: Primary | ICD-10-CM

## 2018-05-15 PROCEDURE — 99212 OFFICE O/P EST SF 10 MIN: CPT | Performed by: INTERNAL MEDICINE

## 2018-05-15 NOTE — PROGRESS NOTES
PCP: JAIME Garcia    Chief Complaint   Patient presents with   • Follow-up   • Abdominal Pain       History of Present Illness:   Eugenia Naik is a 47 y.o. female who presents to GI clinic as a follow up for abdominal pain. Pain mostly gone s/p hysterectomy. Pain ruq worse with movement, not every day. No wt loss.     Past Medical History:   Diagnosis Date   • Acute maxillary sinusitis    • Anxiety and depression    • Arthralgia    • Arthritis     knee and foot   • Colicky RUQ abdominal pain    • Constipation    • Dizziness and giddiness    • Essential tremor     propranolol for this   • GERD (gastroesophageal reflux disease)    • High cholesterol    • Lumbago    • Metatarsalgia, right foot    • PONV (postoperative nausea and vomiting)    • Pustule    • Spinal headache     with csection   • Wears reading eyeglasses        Past Surgical History:   Procedure Laterality Date   •  SECTION     • CHOLECYSTECTOMY     • COLONOSCOPY     • TONSILLECTOMY     • TOTAL LAPAROSCOPIC HYSTERECTOMY N/A 2018    Procedure: TOTAL LAPAROSCOPIC HYSTERECTOMY WITH DAVINCI WITH BILATERAL SALPINGO OOPHERECTOMY, SKIN TAG REMOVAL FROM INNER LABIA MINORA, CYSTO;  Surgeon: Renetta Artis MD;  [facility];  Service: Obstetrics/Gynecology         Current Outpatient Prescriptions:   •  ALPRAZolam (XANAX) 0.5 MG tablet, Take one tablet at bedtime (Patient taking differently: Take 0.5 mg by mouth every night at bedtime. Take one tablet at bedtime), Disp: 30 tablet, Rfl: 2  •  atorvastatin (LIPITOR) 10 MG tablet, TAKE ONE TABLET BY MOUTH DAILY, Disp: 90 tablet, Rfl: 2  •  buPROPion SR (WELLBUTRIN SR) 150 MG 12 hr tablet, TAKE ONE TABLET BY MOUTH TWICE A DAY (Patient taking differently: Patient takes once a day), Disp: 60 tablet, Rfl: 5  •  escitalopram (LEXAPRO) 20 MG tablet, Take 1 tablet by mouth Daily., Disp: 30 tablet, Rfl: 11  •  estradiol (CLIMARA) 0.1 MG/24HR patch, Place 1 patch on the skin 1  (One) Time Per Week. (Patient taking differently: Place 1 patch on the skin 1 (One) Time Per Week. 0.5 MG BiWeekly), Disp: 4 patch, Rfl: 12  •  linaclotide (LINZESS) 145 MCG capsule capsule, Take 145 mcg by mouth Every Morning Before Breakfast., Disp: , Rfl:   •  meclizine (ANTIVERT) 25 MG tablet, Take 25 mg by mouth as needed for dizziness., Disp: , Rfl:   •  meloxicam (MOBIC) 15 MG tablet, TAKE ONE TABLET BY MOUTH DAILY WITH FOOD (Patient taking differently: TAKE ONE TABLET BY MOUTH DAILY WITH FOOD prn Patient has not taken for at least a week (4-11-18)), Disp: 30 tablet, Rfl: 0  •  omeprazole (priLOSEC) 20 MG capsule, Take 2 capsules by mouth 2 (Two) Times a Day. Take 30 minutes before breakfast and dinner for 8 weeks., Disp: 180 capsule, Rfl: 3  •  propranolol LA (INDERAL LA) 60 MG 24 hr capsule, Take 1 capsule by mouth Daily. (Patient taking differently: Take 60 mg by mouth Daily. For tremors), Disp: 30 capsule, Rfl: 11  •  oxyCODONE-acetaminophen (PERCOCET)  MG per tablet, Take 1 tablet by mouth Every 6 (Six) Hours As Needed for Moderate Pain ., Disp: , Rfl:   •  promethazine (PHENERGAN) 25 MG tablet, Take 25 mg by mouth Every 4 (Four) Hours As Needed for Nausea or Vomiting., Disp: , Rfl:     No Known Allergies    Family History   Problem Relation Age of Onset   • Stroke Mother    • Heart disease Mother    • Cancer Other    • Heart disease Other    • Depression Other    • Heart attack Other    • Depression Child    • Anesthesia problems Other    • Rheum arthritis Other    • Hypertension Other        Social History     Social History   • Marital status:      Spouse name: N/A   • Number of children: N/A   • Years of education: N/A     Occupational History   • Not on file.     Social History Main Topics   • Smoking status: Never Smoker   • Smokeless tobacco: Never Used   • Alcohol use Yes      Comment: Occ   • Drug use: No   • Sexual activity: Defer     Other Topics Concern   • Not on file     Social  History Narrative   • No narrative on file       Review of Systems   All other systems reviewed and are negative.        Vitals:    05/15/18 1520   BP: 128/88   Pulse: 76   Resp: 14   Temp: 98.7 °F (37.1 °C)   SpO2: 99%       Physical Exam  General Appearance:  Vitals as above. no acute distress  Head/face:  Normocephalic, atraumatic  Eyes:   EOMI, no conjunctivitis or icterus   Nose/Sinuses:  Nares patent bilaterally without discharge or lesions  Mouth/Throat:  Posterior pharynx is pink without drainage or exudates;  dentition is in good condition and repair  Neck:  trachea is midline, no thyromegaly  Lungs:  Clear to auscultation bilaterally  Heart:  Regular rate and rhythm without murmur, gallop or rub  Abdomen:  Soft, non-tender to palpation, no obvious masses, bowel sounds positive in four quadrants; no abdominal bruits; no guarding or rebound tenderness  Neurologic:  Alert; no focal deficits; age appropriate behavior and speech  Psychiatric: mood and affect are congruent  Vascular: extremities without edema  Skin: no rash or cyanosis.      Assessment/Plan  1.) Heartburn  Discussed ppi continuation and quality of life. The risk of PPI was discussed including the low but possible risk of kidney, bone, infection, cognitive, and electrolyte abnormalities. The benefit of PPI was discussed including quality of life.  We determined periodic risk/benefit assessment and continued prescription was in the patient's best interest.       2.) RUQ pain, improved  Pain improved greatly after robotic hysterectomy and oopherectomy. Continue f/u with ob as needed.    F/u in 1 year or as prn.      Miguelangel Castro MD  5/15/2018

## 2018-05-17 RX ORDER — ALPRAZOLAM 0.5 MG/1
TABLET ORAL
Qty: 30 TABLET | Refills: 3 | OUTPATIENT
Start: 2018-05-17

## 2018-05-24 DIAGNOSIS — R25.1 TREMOR: ICD-10-CM

## 2018-05-24 RX ORDER — PROPRANOLOL HCL 60 MG
CAPSULE, EXTENDED RELEASE 24HR ORAL
Qty: 30 CAPSULE | Refills: 1 | Status: SHIPPED | OUTPATIENT
Start: 2018-05-24 | End: 2018-07-30 | Stop reason: SDUPTHER

## 2018-05-24 RX ORDER — ESCITALOPRAM OXALATE 20 MG/1
TABLET ORAL
Qty: 30 TABLET | Refills: 1 | Status: SHIPPED | OUTPATIENT
Start: 2018-05-24 | End: 2018-07-30 | Stop reason: SDUPTHER

## 2018-06-11 ENCOUNTER — TELEPHONE (OUTPATIENT)
Dept: FAMILY MEDICINE CLINIC | Facility: CLINIC | Age: 48
End: 2018-06-11

## 2018-06-11 NOTE — TELEPHONE ENCOUNTER
----- Message from Eugenia Naik sent at 6/10/2018  4:46 PM EDT -----  Regarding: Prescription Question  I have had a hysterectomy and I feel like I need to adjust and/or switch my antidepressant medication.  I do not know if we can up the dose of the Lexapro since increasing the Wellbutrin makes my tremors worse.   Thanks   Eugenia

## 2018-06-12 NOTE — TELEPHONE ENCOUNTER
Tell her to discontinue her Wellbutrin and see if her tremors improve, this could be making her agitation worse since the hysterectomy, also her back in a couple weeks if she still having problems we may have to change her antidepressant

## 2018-06-19 DIAGNOSIS — Z00.00 ANNUAL PHYSICAL EXAM: ICD-10-CM

## 2018-06-19 RX ORDER — ATORVASTATIN CALCIUM 10 MG/1
TABLET, FILM COATED ORAL
Qty: 90 TABLET | Refills: 1 | Status: SHIPPED | OUTPATIENT
Start: 2018-06-19 | End: 2018-12-26 | Stop reason: SDUPTHER

## 2018-07-02 RX ORDER — BUPROPION HYDROCHLORIDE 150 MG/1
TABLET, EXTENDED RELEASE ORAL
Qty: 60 TABLET | Refills: 4 | Status: SHIPPED | OUTPATIENT
Start: 2018-07-02 | End: 2019-08-27 | Stop reason: SDUPTHER

## 2018-07-22 DIAGNOSIS — M17.11 PRIMARY OSTEOARTHRITIS OF RIGHT KNEE: ICD-10-CM

## 2018-07-23 RX ORDER — MELOXICAM 15 MG/1
TABLET ORAL
Qty: 30 TABLET | Refills: 0 | Status: SHIPPED | OUTPATIENT
Start: 2018-07-23 | End: 2018-08-22 | Stop reason: SDUPTHER

## 2018-07-30 DIAGNOSIS — R25.1 TREMOR: ICD-10-CM

## 2018-07-30 RX ORDER — PROPRANOLOL HCL 60 MG
CAPSULE, EXTENDED RELEASE 24HR ORAL
Qty: 30 CAPSULE | Refills: 3 | Status: SHIPPED | OUTPATIENT
Start: 2018-07-30 | End: 2018-12-02 | Stop reason: SDUPTHER

## 2018-07-30 RX ORDER — ESCITALOPRAM OXALATE 20 MG/1
TABLET ORAL
Qty: 30 TABLET | Refills: 3 | Status: SHIPPED | OUTPATIENT
Start: 2018-07-30 | End: 2018-12-02 | Stop reason: SDUPTHER

## 2018-08-21 ENCOUNTER — OFFICE VISIT (OUTPATIENT)
Dept: ORTHOPEDIC SURGERY | Facility: CLINIC | Age: 48
End: 2018-08-21

## 2018-08-21 VITALS — OXYGEN SATURATION: 98 % | WEIGHT: 174.38 LBS | BODY MASS INDEX: 28.03 KG/M2 | HEIGHT: 66 IN | HEART RATE: 76 BPM

## 2018-08-21 DIAGNOSIS — M70.50 PES ANSERINE BURSITIS: Primary | ICD-10-CM

## 2018-08-21 PROCEDURE — 20610 DRAIN/INJ JOINT/BURSA W/O US: CPT | Performed by: ORTHOPAEDIC SURGERY

## 2018-08-21 PROCEDURE — 99213 OFFICE O/P EST LOW 20 MIN: CPT | Performed by: ORTHOPAEDIC SURGERY

## 2018-08-21 RX ORDER — LIDOCAINE HYDROCHLORIDE 10 MG/ML
2 INJECTION, SOLUTION INFILTRATION; PERINEURAL
Status: COMPLETED | OUTPATIENT
Start: 2018-08-21 | End: 2018-08-21

## 2018-08-21 RX ORDER — BUPIVACAINE HYDROCHLORIDE 2.5 MG/ML
2 INJECTION, SOLUTION INFILTRATION; PERINEURAL
Status: COMPLETED | OUTPATIENT
Start: 2018-08-21 | End: 2018-08-21

## 2018-08-21 RX ORDER — TRIAMCINOLONE ACETONIDE 40 MG/ML
40 INJECTION, SUSPENSION INTRA-ARTICULAR; INTRAMUSCULAR
Status: COMPLETED | OUTPATIENT
Start: 2018-08-21 | End: 2018-08-21

## 2018-08-21 RX ADMIN — BUPIVACAINE HYDROCHLORIDE 2 ML: 2.5 INJECTION, SOLUTION INFILTRATION; PERINEURAL at 07:34

## 2018-08-21 RX ADMIN — TRIAMCINOLONE ACETONIDE 40 MG: 40 INJECTION, SUSPENSION INTRA-ARTICULAR; INTRAMUSCULAR at 07:34

## 2018-08-21 RX ADMIN — LIDOCAINE HYDROCHLORIDE 2 ML: 10 INJECTION, SOLUTION INFILTRATION; PERINEURAL at 07:34

## 2018-08-21 NOTE — PROGRESS NOTES
Procedure   Large Joint Arthrocentesis  Date/Time: 8/21/2018 7:34 AM  Consent given by: patient  Site marked: site marked  Timeout: Immediately prior to procedure a time out was called to verify the correct patient, procedure, equipment, support staff and site/side marked as required   Supporting Documentation  Indications: pain   Procedure Details  Location: knee - R knee  Preparation: Patient was prepped and draped in the usual sterile fashion  Needle size: 22 G  Approach: anteromedial  Medications administered: 2 mL lidocaine 1 %; 40 mg triamcinolone acetonide 40 MG/ML; 2 mL bupivacaine 0.25 %  Patient tolerance: patient tolerated the procedure well with no immediate complications

## 2018-08-21 NOTE — PROGRESS NOTES
Orthopaedic Clinic Note: Knee Established Patient    Chief Complaint   Patient presents with   • Follow-up     5 months - Primary osteoarthritis of right knee & Pes anserine bursitis        HPI    It has been 5  month(s) since Ms. Naik's last visit. She returns to clinic today for follow-up of right knee pain.  She has been doing well over the past several months up until about 2 weeks ago when she was pushing her mother around in a wheelchair at the mall and developed some pain the following day.  She localizes the pain in the medial proximal tibia.  She rates it a 5/10 on the pain scale today.  She has been taking meloxicam as instructed.  This had been working well up until a couple of weeks ago.  She is ambulating with no assistive device today.  She denies any swelling of the knee.  She has been doing activity modification with excellent improvement in symptoms.  She denies fevers chills or constitutional symptoms.  Since this onset of pain 2 weeks ago, she has been doing worse.    Past Medical History:   Diagnosis Date   • Acute maxillary sinusitis    • Anxiety and depression    • Arthralgia    • Arthritis     knee and foot   • Colicky RUQ abdominal pain    • Constipation    • Dizziness and giddiness    • Essential tremor     propranolol for this   • GERD (gastroesophageal reflux disease)    • High cholesterol    • Lumbago    • Metatarsalgia, right foot    • PONV (postoperative nausea and vomiting)    • Pustule    • Spinal headache     with csection   • Wears reading eyeglasses       Past Surgical History:   Procedure Laterality Date   •  SECTION     • CHOLECYSTECTOMY     • COLONOSCOPY     • TONSILLECTOMY     • TOTAL LAPAROSCOPIC HYSTERECTOMY N/A 2018    Procedure: TOTAL LAPAROSCOPIC HYSTERECTOMY WITH DAVINCI WITH BILATERAL SALPINGO OOPHERECTOMY, SKIN TAG REMOVAL FROM INNER LABIA MINORA, CYSTO;  Surgeon: Renetta Artis MD;  Location: [facility];  Service: Obstetrics/Gynecology       Family History   Problem Relation Age of Onset   • Stroke Mother    • Heart disease Mother    • Cancer Other    • Heart disease Other    • Depression Other    • Heart attack Other    • Depression Child    • Anesthesia problems Other    • Rheum arthritis Other    • Hypertension Other      Social History     Social History   • Marital status:      Spouse name: N/A   • Number of children: N/A   • Years of education: N/A     Occupational History   • Not on file.     Social History Main Topics   • Smoking status: Never Smoker   • Smokeless tobacco: Never Used   • Alcohol use Yes      Comment: Occ   • Drug use: No   • Sexual activity: Defer     Other Topics Concern   • Not on file     Social History Narrative   • No narrative on file      Current Outpatient Prescriptions on File Prior to Visit   Medication Sig Dispense Refill   • ALPRAZolam (XANAX) 0.5 MG tablet Take one tablet at bedtime (Patient taking differently: Take 0.5 mg by mouth every night at bedtime. Take one tablet at bedtime) 30 tablet 2   • atorvastatin (LIPITOR) 10 MG tablet TAKE ONE TABLET BY MOUTH DAILY 90 tablet 1   • buPROPion SR (WELLBUTRIN SR) 150 MG 12 hr tablet TAKE ONE TABLET BY MOUTH TWICE A DAY 60 tablet 4   • escitalopram (LEXAPRO) 20 MG tablet TAKE ONE TABLET BY MOUTH DAILY 30 tablet 3   • estradiol (CLIMARA) 0.1 MG/24HR patch Place 1 patch on the skin 1 (One) Time Per Week. (Patient taking differently: Place 1 patch on the skin 1 (One) Time Per Week. 0.5 MG BiWeekly) 4 patch 12   • linaclotide (LINZESS) 145 MCG capsule capsule Take 145 mcg by mouth Every Morning Before Breakfast.     • meclizine (ANTIVERT) 25 MG tablet Take 25 mg by mouth as needed for dizziness.     • meloxicam (MOBIC) 15 MG tablet TAKE ONE TABLET BY MOUTH DAILY WITH FOOD 30 tablet 0   • omeprazole (priLOSEC) 20 MG capsule Take 2 capsules by mouth 2 (Two) Times a Day. Take 30 minutes before breakfast and dinner for 8 weeks. 180 capsule 3   • oxyCODONE-acetaminophen  "(PERCOCET)  MG per tablet Take 1 tablet by mouth Every 6 (Six) Hours As Needed for Moderate Pain .     • promethazine (PHENERGAN) 25 MG tablet Take 25 mg by mouth Every 4 (Four) Hours As Needed for Nausea or Vomiting.     • propranolol LA (INDERAL LA) 60 MG 24 hr capsule TAKE ONE CAPSULE BY MOUTH DAILY 30 capsule 3     No current facility-administered medications on file prior to visit.       No Known Allergies     Review of Systems   Constitutional: Negative.    HENT: Negative.    Eyes: Negative.    Respiratory: Negative.    Cardiovascular: Negative.    Gastrointestinal: Negative.    Endocrine: Negative.    Genitourinary: Negative.    Musculoskeletal: Positive for arthralgias, gait problem and joint swelling.   Skin: Negative.    Allergic/Immunologic: Negative.    Hematological: Negative.    Psychiatric/Behavioral: Negative.         Physical Exam  Pulse 76, height 167.6 cm (65.98\"), weight 79.1 kg (174 lb 6.1 oz), SpO2 98 %  Body mass index is 28.16 kg/m².    GENERAL APPEARANCE: awake, alert, oriented, in no acute distress  LUNGS:  breathing nonlabored  EXTREMITIES: no clubbing, cyanosis  PERIPHERAL PULSES: palpable dorsalis pedis and posterior tibial pulses bilaterally.    GAIT:  Antalgic        ----------  Right Knee Exam:  ----------  ALIGNMENT: neutral  ----------  RANGE OF MOTION:  Normal (0-130 degrees) with no extensor lag or flexion contracture  LIGAMENTOUS STABILITY:   stable to varus and valgus stress at terminal extension and 30 degrees without any evidence of laxity  ----------  STRENGTH:  KNEE FLEXION 5/5  KNEE EXTENSION  5/5  ANKLE DORSIFLEXION  5/5  ANKLE PLANTARFLEXION  5/5  ----------  PAIN WITH PALPATION: pes bursa and posterior medial hamstring  KNEE EFFUSION: no  PAIN WITH KNEE ROM: yes, in pes bursa region   PATELLAR CREPITUS:  no  ----------  SENSATION TO LIGHT TOUCH:  DEEP PERONEAL/SUPERFICIAL PERONEAL/SURAL/SAPHENOUS/TIBIAL: "    intact  ----------  EDEMA:  no  ERYTHEMA:    no  WOUNDS/INCISIONS:  no  _____________________________________________________________________  _____________________________________________________________________    RADIOGRAPHIC FINDINGS:   No new imaging today     Assessment/Plan:   Diagnosis Plan   1. Pes anserine bursitis  Large Joint Arthrocentesis    Ambulatory Referral to Physical Therapy Evaluate and treat     Patient appears to have an acute flareup of her pes bursa bursitis.  She has been doing well with oral anti-inflammatory.  I recommended we continue the oral anti-inflammatory at this time.  I also discussed potential physical therapy options.  Referral to PT will be made today.  We will proceed with pes bursa injection today.  She'll follow-up on an as-needed basis.    Procedure Note:  I discussed with the patient the potential benefits of performing a therapeutic injection of the right knee pes bursa as well as potential risks including but not limited to infection, swelling, pain, bleeding, bruising, nerve/vessel damage, skin color changes, transient elevation in blood glucose levels, and fat atrophy. After informed consent and after the area was prepped with alcohol, ethyl chloride was used to numb the skin. Via the anteromedial approach, 2 cc of 1% lidocaine, 2 cc of 0.25% marcaine and 1 cc of 40mg/ml of Kenalog were injected into the right knee pes bursa. The patient tolerated the procedure well. There were no complications. A sterile dressing was placed over the injection site.      Steve Mauro MD  08/21/18  7:39 AM

## 2018-08-22 DIAGNOSIS — M17.11 PRIMARY OSTEOARTHRITIS OF RIGHT KNEE: ICD-10-CM

## 2018-08-22 RX ORDER — MELOXICAM 15 MG/1
TABLET ORAL
Qty: 30 TABLET | Refills: 0 | Status: ON HOLD | OUTPATIENT
Start: 2018-08-22 | End: 2018-12-05

## 2018-10-08 RX ORDER — ALPRAZOLAM 0.5 MG/1
TABLET ORAL
Qty: 60 TABLET | Refills: 2 | OUTPATIENT
Start: 2018-10-08

## 2018-10-08 NOTE — TELEPHONE ENCOUNTER
Pt hasn't had an appt since Feb 2018, follow scheduled on 10/16/2018. No Jaren, labs six months old. Ok for refill?

## 2018-10-16 ENCOUNTER — LAB (OUTPATIENT)
Dept: LAB | Facility: HOSPITAL | Age: 48
End: 2018-10-16

## 2018-10-16 ENCOUNTER — OFFICE VISIT (OUTPATIENT)
Dept: FAMILY MEDICINE CLINIC | Facility: CLINIC | Age: 48
End: 2018-10-16

## 2018-10-16 VITALS
TEMPERATURE: 98.6 F | OXYGEN SATURATION: 98 % | RESPIRATION RATE: 14 BRPM | HEART RATE: 72 BPM | SYSTOLIC BLOOD PRESSURE: 112 MMHG | HEIGHT: 66 IN | WEIGHT: 181.4 LBS | BODY MASS INDEX: 29.15 KG/M2 | DIASTOLIC BLOOD PRESSURE: 76 MMHG

## 2018-10-16 DIAGNOSIS — Z00.00 ROUTINE GENERAL MEDICAL EXAMINATION AT A HEALTH CARE FACILITY: ICD-10-CM

## 2018-10-16 DIAGNOSIS — Z00.00 ROUTINE GENERAL MEDICAL EXAMINATION AT A HEALTH CARE FACILITY: Primary | ICD-10-CM

## 2018-10-16 LAB
ARTICHOKE IGE QN: 113 MG/DL (ref 0–130)
BASOPHILS # BLD AUTO: 0.05 10*3/MM3 (ref 0–0.2)
BASOPHILS NFR BLD AUTO: 0.9 % (ref 0–1)
BILIRUB BLD-MCNC: NEGATIVE MG/DL
CHOLEST SERPL-MCNC: 192 MG/DL (ref 0–200)
CLARITY, POC: CLEAR
COLOR UR: ABNORMAL
DEPRECATED RDW RBC AUTO: 42.7 FL (ref 37–54)
EOSINOPHIL # BLD AUTO: 0.14 10*3/MM3 (ref 0–0.3)
EOSINOPHIL NFR BLD AUTO: 2.6 % (ref 0–3)
ERYTHROCYTE [DISTWIDTH] IN BLOOD BY AUTOMATED COUNT: 13.2 % (ref 11.3–14.5)
GLUCOSE UR STRIP-MCNC: NEGATIVE MG/DL
HBA1C MFR BLD: 5.9 % (ref 4.8–5.6)
HCT VFR BLD AUTO: 42.6 % (ref 34.5–44)
HDLC SERPL-MCNC: 63 MG/DL (ref 40–60)
HGB BLD-MCNC: 14 G/DL (ref 11.5–15.5)
IMM GRANULOCYTES # BLD: 0.02 10*3/MM3 (ref 0–0.03)
IMM GRANULOCYTES NFR BLD: 0.4 % (ref 0–0.6)
KETONES UR QL: NEGATIVE
LEUKOCYTE EST, POC: NEGATIVE
LYMPHOCYTES # BLD AUTO: 1 10*3/MM3 (ref 0.6–4.8)
LYMPHOCYTES NFR BLD AUTO: 18.5 % (ref 24–44)
MCH RBC QN AUTO: 29.2 PG (ref 27–31)
MCHC RBC AUTO-ENTMCNC: 32.9 G/DL (ref 32–36)
MCV RBC AUTO: 88.8 FL (ref 80–99)
MONOCYTES # BLD AUTO: 0.35 10*3/MM3 (ref 0–1)
MONOCYTES NFR BLD AUTO: 6.5 % (ref 0–12)
NEUTROPHILS # BLD AUTO: 3.87 10*3/MM3 (ref 1.5–8.3)
NEUTROPHILS NFR BLD AUTO: 71.5 % (ref 41–71)
NITRITE UR-MCNC: NEGATIVE MG/ML
PH UR: 7.5 [PH] (ref 5–8)
PLATELET # BLD AUTO: 247 10*3/MM3 (ref 150–450)
PMV BLD AUTO: 10.2 FL (ref 6–12)
PROT UR STRIP-MCNC: ABNORMAL MG/DL
RBC # BLD AUTO: 4.8 10*6/MM3 (ref 3.89–5.14)
RBC # UR STRIP: NEGATIVE /UL
SP GR UR: 1 (ref 1–1.03)
T4 FREE SERPL-MCNC: 1.4 NG/DL (ref 0.89–1.76)
TRIGL SERPL-MCNC: 88 MG/DL (ref 0–150)
TSH SERPL DL<=0.05 MIU/L-ACNC: 1.93 MIU/ML (ref 0.35–5.35)
UROBILINOGEN UR QL: NORMAL
WBC NRBC COR # BLD: 5.41 10*3/MM3 (ref 3.5–10.8)

## 2018-10-16 PROCEDURE — 99396 PREV VISIT EST AGE 40-64: CPT | Performed by: PHYSICIAN ASSISTANT

## 2018-10-16 PROCEDURE — 80061 LIPID PANEL: CPT

## 2018-10-16 PROCEDURE — 85025 COMPLETE CBC W/AUTO DIFF WBC: CPT

## 2018-10-16 PROCEDURE — 36415 COLL VENOUS BLD VENIPUNCTURE: CPT

## 2018-10-16 PROCEDURE — 84443 ASSAY THYROID STIM HORMONE: CPT

## 2018-10-16 PROCEDURE — 83036 HEMOGLOBIN GLYCOSYLATED A1C: CPT

## 2018-10-16 PROCEDURE — 81003 URINALYSIS AUTO W/O SCOPE: CPT | Performed by: PHYSICIAN ASSISTANT

## 2018-10-16 PROCEDURE — 84439 ASSAY OF FREE THYROXINE: CPT

## 2018-10-16 NOTE — PROGRESS NOTES
Subjective   Eugenia Naik is a 48 y.o. female  Annual Exam (Pt is fasting. UTD on MMG, Pap Smear, flu shot. )      History of Present Illness    Patient is a pleasant 48-jaylen-od white female comes in for preventive medical examination ,  complains of fatigue , anxety  The following portions of the patient's history were reviewed and updated as appropriate: allergies, current medications, past social history and problem list    Review of Systems   Constitutional: Negative.    HENT: Negative.    Eyes: Negative.    Respiratory: Negative.    Cardiovascular: Negative.    Gastrointestinal: Negative.    Endocrine: Negative.    Genitourinary: Negative.    Musculoskeletal: Negative.    Skin: Negative.    Allergic/Immunologic: Negative.    Neurological: Negative.    Hematological: Negative.    Psychiatric/Behavioral: Negative.    All other systems reviewed and are negative.      Objective     Vitals:    10/16/18 0932   BP: 112/76   Pulse: 72   Resp: 14   Temp: 98.6 °F (37 °C)   SpO2: 98%       Physical Exam   Constitutional: She appears well-developed and well-nourished.   Neck: No JVD present.   Cardiovascular: Normal rate, regular rhythm, normal heart sounds, intact distal pulses and normal pulses.    No murmur heard.  Pulmonary/Chest: Effort normal and breath sounds normal. No respiratory distress.   Abdominal: Soft. Bowel sounds are normal. There is no hepatosplenomegaly. There is no tenderness.   Musculoskeletal: She exhibits no edema.   Skin: Skin is warm and dry.   Nursing note and vitals reviewed.      Assessment/Plan     Diagnoses and all orders for this visit:    Routine general medical examination at a health care facility  -     POCT urinalysis dipstick, automated  -     CBC & Differential; Future  -     Hemoglobin A1c; Future  -     Lipid Panel; Future  -     TSH; Future  -     T4, Free; Future     preventve medicine discussed, die, exercise, healty living discussed importance    losing water  for anxiety we  gave her Xanax 1 mg twice a day dispense 60

## 2018-10-19 ENCOUNTER — TELEPHONE (OUTPATIENT)
Dept: FAMILY MEDICINE CLINIC | Facility: CLINIC | Age: 48
End: 2018-10-19

## 2018-10-19 NOTE — TELEPHONE ENCOUNTER
----- Message from Eugenia Naik sent at 10/19/2018 10:26 AM EDT -----  Regarding: Prescription Question  I was wondering if you could send a prescription for Miralax.  Thanks,  Eugenia

## 2018-10-28 DIAGNOSIS — R10.11 RIGHT UPPER QUADRANT ABDOMINAL PAIN: ICD-10-CM

## 2018-10-29 RX ORDER — OMEPRAZOLE 20 MG/1
CAPSULE, DELAYED RELEASE ORAL
Qty: 120 CAPSULE | Refills: 2 | Status: SHIPPED | OUTPATIENT
Start: 2018-10-29 | End: 2018-11-27

## 2018-11-04 DIAGNOSIS — M17.11 PRIMARY OSTEOARTHRITIS OF RIGHT KNEE: ICD-10-CM

## 2018-11-05 RX ORDER — MELOXICAM 15 MG/1
TABLET ORAL
Qty: 30 TABLET | Refills: 0 | OUTPATIENT
Start: 2018-11-05

## 2018-11-12 RX ORDER — POLYETHYLENE GLYCOL 3350 17 G/17G
POWDER, FOR SOLUTION ORAL
Qty: 255 G | Refills: 0 | Status: SHIPPED | OUTPATIENT
Start: 2018-11-12 | End: 2019-02-13 | Stop reason: SDUPTHER

## 2018-11-13 ENCOUNTER — APPOINTMENT (OUTPATIENT)
Dept: GENERAL RADIOLOGY | Facility: HOSPITAL | Age: 48
End: 2018-11-13
Attending: ORTHOPAEDIC SURGERY

## 2018-11-13 ENCOUNTER — OFFICE VISIT (OUTPATIENT)
Dept: ORTHOPEDIC SURGERY | Facility: CLINIC | Age: 48
End: 2018-11-13

## 2018-11-13 VITALS — WEIGHT: 179.01 LBS | BODY MASS INDEX: 28.77 KG/M2 | OXYGEN SATURATION: 96 % | HEART RATE: 88 BPM | HEIGHT: 66 IN

## 2018-11-13 DIAGNOSIS — M17.11 PRIMARY OSTEOARTHRITIS OF RIGHT KNEE: Primary | ICD-10-CM

## 2018-11-13 DIAGNOSIS — M25.561 MEDIAL KNEE PAIN, RIGHT: ICD-10-CM

## 2018-11-13 DIAGNOSIS — M93.261 OSTEOCHONDRITIS DISSECANS OF RIGHT KNEE: ICD-10-CM

## 2018-11-13 PROCEDURE — 99213 OFFICE O/P EST LOW 20 MIN: CPT | Performed by: ORTHOPAEDIC SURGERY

## 2018-11-13 NOTE — PROGRESS NOTES
Orthopaedic Clinic Note: Knee Established Patient    Chief Complaint   Patient presents with   • Right Knee - Pain     12 week f/u  Pes anserine bursitis          HPI    It has been 3  month(s) since Ms. Naik's last visit. She returns to clinic today for follow-up of right medial-based knee pain.  She was last treated for post bursitis and her last follow-up appointment.  Upon return to clinic today, she rates her pain a 5/10 on the pain scale.  She continues to have dull achy burning sensation with throbbing localized the medial aspect of the knee.  She is complaining of pain, swelling as well that is worse with walking, standing, climbing stairs.  Ice, anti-inflammatories, and rest improve her pain.  She is also undergone physical therapy for 3 months without significant improvement in her symptoms.  She is ambulating with no assistive device today.  She continues to have significant limitations in daily activities and overall is doing worse.  She is frustrated that her pain has severely limited her activities for the past year.    Past Medical History:   Diagnosis Date   • Acute maxillary sinusitis    • Anxiety and depression    • Arthralgia    • Arthritis     knee and foot   • Colicky RUQ abdominal pain    • Constipation    • Depression    • Dizziness and giddiness    • Essential tremor     propranolol for this   • GERD (gastroesophageal reflux disease)    • High cholesterol    • Lumbago    • Metatarsalgia, right foot    • PONV (postoperative nausea and vomiting)    • Pustule    • Spinal headache     with csection   • Tremor    • Wears reading eyeglasses       Past Surgical History:   Procedure Laterality Date   • ADENOIDECTOMY     •  SECTION     • CHOLECYSTECTOMY     • COLONOSCOPY     • ENDOMETRIAL ABLATION     • TONSILLECTOMY        Family History   Problem Relation Age of Onset   • Stroke Mother    • Heart disease Mother    • Arthritis Mother    • Cancer Other    • Heart disease Other    •  Depression Other    • Heart attack Other    • Depression Child    • Anesthesia problems Other    • Rheum arthritis Other    • Hypertension Other    • Hyperlipidemia Daughter      Social History     Socioeconomic History   • Marital status:      Spouse name: Not on file   • Number of children: Not on file   • Years of education: Not on file   • Highest education level: Not on file   Social Needs   • Financial resource strain: Not on file   • Food insecurity - worry: Not on file   • Food insecurity - inability: Not on file   • Transportation needs - medical: Not on file   • Transportation needs - non-medical: Not on file   Occupational History   • Not on file   Tobacco Use   • Smoking status: Never Smoker   • Smokeless tobacco: Never Used   Substance and Sexual Activity   • Alcohol use: Yes     Types: 1 Glasses of wine, 1 Cans of beer per week     Comment: Occ   • Drug use: No   • Sexual activity: Yes     Partners: Male     Birth control/protection: Surgical   Other Topics Concern   • Not on file   Social History Narrative   • Not on file      Current Outpatient Medications on File Prior to Visit   Medication Sig Dispense Refill   • ALPRAZolam (XANAX) 0.5 MG tablet Take one tablet at bedtime (Patient taking differently: Take 0.5 mg by mouth every night at bedtime. Take one tablet at bedtime) 30 tablet 2   • atorvastatin (LIPITOR) 10 MG tablet TAKE ONE TABLET BY MOUTH DAILY 90 tablet 1   • buPROPion SR (WELLBUTRIN SR) 150 MG 12 hr tablet TAKE ONE TABLET BY MOUTH TWICE A DAY 60 tablet 4   • escitalopram (LEXAPRO) 20 MG tablet TAKE ONE TABLET BY MOUTH DAILY 30 tablet 3   • estradiol (CLIMARA) 0.1 MG/24HR patch Place 1 patch on the skin 1 (One) Time Per Week. (Patient taking differently: Place 1 patch on the skin 1 (One) Time Per Week. 0.5 MG BiWeekly) 4 patch 12   • linaclotide (LINZESS) 145 MCG capsule capsule Take 145 mcg by mouth Every Morning Before Breakfast.     • meclizine (ANTIVERT) 25 MG tablet Take 25 mg  "by mouth as needed for dizziness.     • meloxicam (MOBIC) 15 MG tablet TAKE ONE TABLET BY MOUTH DAILY WITH FOOD 30 tablet 0   • omeprazole (priLOSEC) 20 MG capsule TAKE TWO CAPSULES BY MOUTH TWICE A DAY *30 MINS* BEFORE BREAKFAST OR DINNER FOR 8 WEEKS 120 capsule 2   • oxyCODONE-acetaminophen (PERCOCET)  MG per tablet Take 1 tablet by mouth Every 6 (Six) Hours As Needed for Moderate Pain .     • polyethylene glycol (MIRALAX) powder DISSOLVE 17 GRAMS IN 8 OUNCES OF LIQUID AND DRINK ONCE A  g 0   • promethazine (PHENERGAN) 25 MG tablet Take 25 mg by mouth Every 4 (Four) Hours As Needed for Nausea or Vomiting.     • propranolol LA (INDERAL LA) 60 MG 24 hr capsule TAKE ONE CAPSULE BY MOUTH DAILY 30 capsule 3     No current facility-administered medications on file prior to visit.       No Known Allergies     Review of Systems   Constitutional: Positive for activity change.   HENT: Positive for nosebleeds.    Eyes: Negative.    Respiratory: Negative.    Cardiovascular: Negative.    Gastrointestinal: Negative.    Endocrine: Negative.    Genitourinary: Negative.    Musculoskeletal: Positive for arthralgias, back pain, joint swelling and myalgias.   Skin: Negative.    Allergic/Immunologic: Negative.    Neurological: Positive for tremors.   Hematological: Negative.    Psychiatric/Behavioral: Negative.         Physical Exam  Pulse 88, height 167.6 cm (65.98\"), weight 81.2 kg (179 lb 0.2 oz), SpO2 96 %, not currently breastfeeding.    Body mass index is 28.91 kg/m².    GENERAL APPEARANCE: awake, alert, oriented, in no acute distress  LUNGS:  breathing nonlabored  EXTREMITIES: no clubbing, cyanosis  PERIPHERAL PULSES: palpable dorsalis pedis and posterior tibial pulses bilaterally.    GAIT:  Antalgic        ----------  Right Knee Exam:  ----------  ALIGNMENT: neutral  ----------  RANGE OF MOTION:  Normal (0-130 degrees) with no extensor lag or flexion contracture  LIGAMENTOUS STABILITY:   stable to varus and valgus " stress at terminal extension and 30 degrees without any evidence of laxity  ----------  STRENGTH:  KNEE FLEXION 5/5  KNEE EXTENSION  5/5  ANKLE DORSIFLEXION  5/5  ANKLE PLANTARFLEXION  5/5  ----------  PAIN WITH PALPATION:medial joint line and pes bursa  KNEE EFFUSION: Mild knee effusion  PAIN WITH KNEE ROM: yes, in pes bursa region and medial joint line   PATELLAR CREPITUS:  no  ----------  SENSATION TO LIGHT TOUCH:  DEEP PERONEAL/SUPERFICIAL PERONEAL/SURAL/SAPHENOUS/TIBIAL:    intact  ----------  EDEMA:  no  ERYTHEMA:    no  WOUNDS/INCISIONS:  no  _____________________________________________________________________  _____________________________________________________________________    RADIOGRAPHIC FINDINGS:   Indication: Right knee pain     Comparison: Todays xrays were compared to previous xrays from 12/14/17     Knee films: Radiographs of a straight mild arthritic changes with no significant joint space narrowing.  Small periarticular osteophytes are visualized.  No acute bony injury or fracture.  No significant changes compared to prior radiographs.    Assessment/Plan:   Diagnosis Plan   1. Primary osteoarthritis of right knee  XR Knee 4+ View Right   2. Osteochondritis dissecans of right knee  MRI Knee Right Without Contrast   3. Medial knee pain, right  MRI Knee Right Without Contrast     Patient continue has had persistent medial joint line pain that has been refractory to conservative treatment including anti-inflammatories, physical therapy, rest, ice, and bracing.  She has a prior MRI which showed a small osteochondritis dissecans lesion.  Based on her worsening symptoms, I'm concerned this lesion either has not healed or has resulted in subchondral fracture.  I recommended obtaining an MRI of the right knee to evaluate her ongoing pain.  Based on the MRI results, if a meniscal tear is identified, arthroscopy could be performed.  At the osteochondritis dissecans lesion appears to progress, then I  believe that her sole source of treatment would be a medial unicompartmental knee arthroplasty.  I will see her back after the MRI has been completed to discuss the results and how to proceed.      Steve Mauro MD  11/13/18  8:21 AM

## 2018-11-14 ENCOUNTER — TELEPHONE (OUTPATIENT)
Dept: ORTHOPEDIC SURGERY | Facility: CLINIC | Age: 48
End: 2018-11-14

## 2018-11-14 NOTE — TELEPHONE ENCOUNTER
Regarding: Non-Urgent Medical Question  ----- Message from Mychart, Generic sent at 11/13/2018  8:42 PM EST -----    I was thinking about my knee and was wondering if we should do an mri of the whole leg?  Maybe something there is causing my knee pain. I do have calf and back of the leg pain. My mri is scheduled for next Tuesday.   Thanks

## 2018-11-14 NOTE — TELEPHONE ENCOUNTER
Routed Dr. Mauro's response to the patient.    Yessica      Per Dr. Mauro - The knee MRI should cover to proximal tibia and distal femur sufficiently.  A whole leg MRI would not provide the detail needed to see inside the knee joint. (Routing comment)

## 2018-11-20 ENCOUNTER — HOSPITAL ENCOUNTER (OUTPATIENT)
Dept: MRI IMAGING | Facility: HOSPITAL | Age: 48
Discharge: HOME | End: 2018-11-20
Attending: ORTHOPAEDIC SURGERY | Admitting: ORTHOPAEDIC SURGERY

## 2018-11-20 DIAGNOSIS — M25.561 MEDIAL KNEE PAIN, RIGHT: ICD-10-CM

## 2018-11-20 DIAGNOSIS — M93.261 OSTEOCHONDRITIS DISSECANS OF RIGHT KNEE: ICD-10-CM

## 2018-11-20 PROCEDURE — 73721 MRI JNT OF LWR EXTRE W/O DYE: CPT

## 2018-11-27 ENCOUNTER — APPOINTMENT (OUTPATIENT)
Dept: PREADMISSION TESTING | Facility: HOSPITAL | Age: 48
End: 2018-11-27

## 2018-11-27 ENCOUNTER — OFFICE VISIT (OUTPATIENT)
Dept: ORTHOPEDIC SURGERY | Facility: CLINIC | Age: 48
End: 2018-11-27

## 2018-11-27 VITALS — HEART RATE: 81 BPM | OXYGEN SATURATION: 100 % | HEIGHT: 66 IN | WEIGHT: 177.91 LBS | BODY MASS INDEX: 28.59 KG/M2

## 2018-11-27 VITALS — BODY MASS INDEX: 29.48 KG/M2 | WEIGHT: 183.42 LBS | HEIGHT: 66 IN

## 2018-11-27 DIAGNOSIS — M23.203 OLD COMPLEX TEAR OF MEDIAL MENISCUS OF RIGHT KNEE: Primary | ICD-10-CM

## 2018-11-27 DIAGNOSIS — M93.261 OSTEOCHONDRITIS DISSECANS OF RIGHT KNEE: ICD-10-CM

## 2018-11-27 DIAGNOSIS — M23.203 OLD COMPLEX TEAR OF MEDIAL MENISCUS OF RIGHT KNEE: ICD-10-CM

## 2018-11-27 LAB
ANION GAP SERPL CALCULATED.3IONS-SCNC: 3 MMOL/L (ref 3–11)
APTT PPP: 24.2 SECONDS (ref 24–31)
BASOPHILS # BLD AUTO: 0.04 10*3/MM3 (ref 0–0.2)
BASOPHILS NFR BLD AUTO: 0.8 % (ref 0–1)
BUN BLD-MCNC: 15 MG/DL (ref 9–23)
BUN/CREAT SERPL: 16.9 (ref 7–25)
CALCIUM SPEC-SCNC: 9.2 MG/DL (ref 8.7–10.4)
CHLORIDE SERPL-SCNC: 106 MMOL/L (ref 99–109)
CO2 SERPL-SCNC: 29 MMOL/L (ref 20–31)
CREAT BLD-MCNC: 0.89 MG/DL (ref 0.6–1.3)
DEPRECATED RDW RBC AUTO: 41.9 FL (ref 37–54)
EOSINOPHIL # BLD AUTO: 0.17 10*3/MM3 (ref 0–0.3)
EOSINOPHIL NFR BLD AUTO: 3.2 % (ref 0–3)
ERYTHROCYTE [DISTWIDTH] IN BLOOD BY AUTOMATED COUNT: 12.8 % (ref 11.3–14.5)
GFR SERPL CREATININE-BSD FRML MDRD: 67 ML/MIN/1.73
GLUCOSE BLD-MCNC: 77 MG/DL (ref 70–100)
HCT VFR BLD AUTO: 42 % (ref 34.5–44)
HGB BLD-MCNC: 13.8 G/DL (ref 11.5–15.5)
IMM GRANULOCYTES # BLD: 0.01 10*3/MM3 (ref 0–0.03)
IMM GRANULOCYTES NFR BLD: 0.2 % (ref 0–0.6)
INR PPP: 0.97 (ref 0.91–1.09)
LYMPHOCYTES # BLD AUTO: 0.93 10*3/MM3 (ref 0.6–4.8)
LYMPHOCYTES NFR BLD AUTO: 17.6 % (ref 24–44)
MCH RBC QN AUTO: 29.6 PG (ref 27–31)
MCHC RBC AUTO-ENTMCNC: 32.9 G/DL (ref 32–36)
MCV RBC AUTO: 90.1 FL (ref 80–99)
MONOCYTES # BLD AUTO: 0.4 10*3/MM3 (ref 0–1)
MONOCYTES NFR BLD AUTO: 7.6 % (ref 0–12)
NEUTROPHILS # BLD AUTO: 3.73 10*3/MM3 (ref 1.5–8.3)
NEUTROPHILS NFR BLD AUTO: 70.8 % (ref 41–71)
PLATELET # BLD AUTO: 241 10*3/MM3 (ref 150–450)
PMV BLD AUTO: 9.5 FL (ref 6–12)
POTASSIUM BLD-SCNC: 4.3 MMOL/L (ref 3.5–5.5)
PROTHROMBIN TIME: 10.2 SECONDS (ref 9.6–11.5)
RBC # BLD AUTO: 4.66 10*6/MM3 (ref 3.89–5.14)
SODIUM BLD-SCNC: 138 MMOL/L (ref 132–146)
WBC NRBC COR # BLD: 5.27 10*3/MM3 (ref 3.5–10.8)

## 2018-11-27 PROCEDURE — 85730 THROMBOPLASTIN TIME PARTIAL: CPT | Performed by: ORTHOPAEDIC SURGERY

## 2018-11-27 PROCEDURE — 99214 OFFICE O/P EST MOD 30 MIN: CPT | Performed by: ORTHOPAEDIC SURGERY

## 2018-11-27 PROCEDURE — 85025 COMPLETE CBC W/AUTO DIFF WBC: CPT | Performed by: ORTHOPAEDIC SURGERY

## 2018-11-27 PROCEDURE — 85610 PROTHROMBIN TIME: CPT | Performed by: ORTHOPAEDIC SURGERY

## 2018-11-27 PROCEDURE — 36415 COLL VENOUS BLD VENIPUNCTURE: CPT

## 2018-11-27 PROCEDURE — 80048 BASIC METABOLIC PNL TOTAL CA: CPT | Performed by: ORTHOPAEDIC SURGERY

## 2018-11-27 RX ORDER — POLYETHYLENE GLYCOL 3350 17 G/17G
17 POWDER, FOR SOLUTION ORAL DAILY
Status: ON HOLD | COMMUNITY
End: 2018-12-05

## 2018-11-27 RX ORDER — PROPRANOLOL HCL 60 MG
60 CAPSULE, EXTENDED RELEASE 24HR ORAL DAILY
Status: ON HOLD | COMMUNITY
End: 2018-12-05

## 2018-11-27 RX ORDER — ESCITALOPRAM OXALATE 20 MG/1
20 TABLET ORAL DAILY
Status: ON HOLD | COMMUNITY
End: 2018-12-05

## 2018-11-27 RX ORDER — ACETAMINOPHEN 325 MG/1
1000 TABLET ORAL ONCE
Status: CANCELLED | OUTPATIENT
Start: 2018-11-27 | End: 2018-11-27

## 2018-11-27 RX ORDER — ATORVASTATIN CALCIUM 10 MG/1
10 TABLET, FILM COATED ORAL NIGHTLY
Status: ON HOLD | COMMUNITY
End: 2018-12-05

## 2018-11-27 RX ORDER — BUPROPION HYDROCHLORIDE 150 MG/1
150 TABLET, EXTENDED RELEASE ORAL DAILY
Status: ON HOLD | COMMUNITY
End: 2018-12-05

## 2018-11-27 RX ORDER — OMEPRAZOLE 20 MG/1
20 CAPSULE, DELAYED RELEASE ORAL EVERY OTHER DAY
COMMUNITY
End: 2020-05-12

## 2018-11-27 NOTE — PAT
Patient to apply Chlorhexadine wipes  to surgical area (as instructed) the night before procedure and the AM of procedure. Wipes provided.    SURGERY TIME WILL BE PROVIDED DAY BEFORE PROCEDURE FROM MD OFFICE.

## 2018-11-27 NOTE — PROGRESS NOTES
Orthopaedic Clinic Note: Knee Established Patient    Chief Complaint   Patient presents with   • Follow-up     2 week - MRI Recheck - Primary osteoarthritis of right knee; Osteochondritis dissecans of right knee        HPI    It has been 2  week(s) since Ms. Naik's last visit. She returns to clinic today for follow-up of MRI.  She has having persistent ongoing right knee pain with a known osteochondritis dissecans lesion in the right knee.  She returns today for MRI follow-up.  Overall her symptoms remain unchanged.  Rates pain 5/10 on pain scale.  Pain continues to be localized the medial joint line and medial proximal tibia.  She is ambulating with no assistive device.    Past Medical History:   Diagnosis Date   • Acute maxillary sinusitis    • Anxiety and depression    • Arthralgia    • Arthritis     knee and foot   • Colicky RUQ abdominal pain    • Constipation    • Depression    • Dizziness and giddiness    • Essential tremor     propranolol for this   • GERD (gastroesophageal reflux disease)    • High cholesterol    • Lumbago    • Metatarsalgia, right foot    • PONV (postoperative nausea and vomiting)    • Pustule    • Spinal headache     with csection   • Tremor    • Wears reading eyeglasses       Past Surgical History:   Procedure Laterality Date   • ADENOIDECTOMY     •  SECTION     • CHOLECYSTECTOMY     • COLONOSCOPY     • ENDOMETRIAL ABLATION     • TONSILLECTOMY        Family History   Problem Relation Age of Onset   • Stroke Mother    • Heart disease Mother    • Arthritis Mother    • Cancer Other    • Heart disease Other    • Depression Other    • Heart attack Other    • Depression Child    • Anesthesia problems Other    • Rheum arthritis Other    • Hypertension Other    • Hyperlipidemia Daughter      Social History     Socioeconomic History   • Marital status:      Spouse name: Not on file   • Number of children: Not on file   • Years of education: Not on file   • Highest education  level: Not on file   Social Needs   • Financial resource strain: Not on file   • Food insecurity - worry: Not on file   • Food insecurity - inability: Not on file   • Transportation needs - medical: Not on file   • Transportation needs - non-medical: Not on file   Occupational History   • Not on file   Tobacco Use   • Smoking status: Never Smoker   • Smokeless tobacco: Never Used   Substance and Sexual Activity   • Alcohol use: Yes     Types: 1 Glasses of wine, 1 Cans of beer per week     Comment: Occ   • Drug use: No   • Sexual activity: Yes     Partners: Male     Birth control/protection: Surgical   Other Topics Concern   • Not on file   Social History Narrative   • Not on file      Current Outpatient Medications on File Prior to Visit   Medication Sig Dispense Refill   • ALPRAZolam (XANAX) 0.5 MG tablet Take one tablet at bedtime (Patient taking differently: Take 0.5 mg by mouth every night at bedtime. Take one tablet at bedtime) 30 tablet 2   • atorvastatin (LIPITOR) 10 MG tablet TAKE ONE TABLET BY MOUTH DAILY 90 tablet 1   • buPROPion SR (WELLBUTRIN SR) 150 MG 12 hr tablet TAKE ONE TABLET BY MOUTH TWICE A DAY 60 tablet 4   • escitalopram (LEXAPRO) 20 MG tablet TAKE ONE TABLET BY MOUTH DAILY 30 tablet 3   • estradiol (CLIMARA) 0.1 MG/24HR patch Place 1 patch on the skin 1 (One) Time Per Week. (Patient taking differently: Place 1 patch on the skin 1 (One) Time Per Week. 0.5 MG BiWeekly) 4 patch 12   • linaclotide (LINZESS) 145 MCG capsule capsule Take 145 mcg by mouth Every Morning Before Breakfast.     • meclizine (ANTIVERT) 25 MG tablet Take 25 mg by mouth as needed for dizziness.     • meloxicam (MOBIC) 15 MG tablet TAKE ONE TABLET BY MOUTH DAILY WITH FOOD 30 tablet 0   • omeprazole (priLOSEC) 20 MG capsule TAKE TWO CAPSULES BY MOUTH TWICE A DAY *30 MINS* BEFORE BREAKFAST OR DINNER FOR 8 WEEKS (Patient taking differently: takes one every other day) 120 capsule 2   • oxyCODONE-acetaminophen (PERCOCET)  MG  "per tablet Take 1 tablet by mouth Every 6 (Six) Hours As Needed for Moderate Pain .     • polyethylene glycol (MIRALAX) powder DISSOLVE 17 GRAMS IN 8 OUNCES OF LIQUID AND DRINK ONCE A  g 0   • promethazine (PHENERGAN) 25 MG tablet Take 25 mg by mouth Every 4 (Four) Hours As Needed for Nausea or Vomiting.     • propranolol LA (INDERAL LA) 60 MG 24 hr capsule TAKE ONE CAPSULE BY MOUTH DAILY 30 capsule 3     No current facility-administered medications on file prior to visit.       No Known Allergies     Review of Systems   Constitutional: Positive for activity change.   HENT: Negative.    Eyes: Negative.    Respiratory: Negative.    Cardiovascular: Negative.    Gastrointestinal: Negative.    Endocrine: Negative.    Genitourinary: Negative.    Musculoskeletal: Positive for arthralgias and myalgias.   Skin: Negative.    Allergic/Immunologic: Negative.    Neurological: Positive for tremors.   Hematological: Negative.    Psychiatric/Behavioral: Negative.         Physical Exam  Pulse 81, height 167.6 cm (65.98\"), weight 80.7 kg (177 lb 14.6 oz), last menstrual period 04/07/2018, SpO2 100 %, not currently breastfeeding.    Body mass index is 28.73 kg/m².    GENERAL APPEARANCE: awake, alert, oriented, in no acute distress  LUNGS:  breathing nonlabored  EXTREMITIES: no clubbing, cyanosis  PERIPHERAL PULSES: palpable dorsalis pedis and posterior tibial pulses bilaterally.    GAIT:  Antalgic        ----------  Right Knee Exam:  ----------  ALIGNMENT: neutral  ----------  RANGE OF MOTION:  Normal (0-130 degrees) with no extensor lag or flexion contracture  LIGAMENTOUS STABILITY:   stable to varus and valgus stress at terminal extension and 30 degrees without any evidence of laxity  ----------  STRENGTH:  KNEE FLEXION 5/5  KNEE EXTENSION  5/5  ANKLE DORSIFLEXION  5/5  ANKLE PLANTARFLEXION  5/5  ----------  PAIN WITH PALPATION:medial joint line and pes bursa  KNEE EFFUSION: Mild knee effusion  PAIN WITH KNEE ROM: yes, " in pes bursa region and medial joint line   PATELLAR CREPITUS:  no  ----------  SENSATION TO LIGHT TOUCH:  DEEP PERONEAL/SUPERFICIAL PERONEAL/SURAL/SAPHENOUS/TIBIAL:    intact  ----------  EDEMA:  no  ERYTHEMA:    no  WOUNDS/INCISIONS:  no  _____________________________________________________________________  _____________________________________________________________________    RADIOGRAPHIC FINDINGS:   MRI from 11/20/18 was personally reviewed.  MRI shows evidence of an improved appearance of the osteochondritis dissecans lesion of the anterior medial tibia.  Surrounding bony edema is improved compared to prior MRI.  There is evidence of signal abnormality along the medial meniscus concerning for possible medial meniscal tear.  There is slight medial meniscal extrusion.      Assessment/Plan:   Diagnosis Plan   1. Old complex tear of medial meniscus of right knee  Case Request    ceFAZolin (ANCEF) 2 g in sodium chloride 0.9 % 100 mL IVPB    acetaminophen (TYLENOL) tablet 975 mg    CBC and Differential    Basic metabolic panel    APTT    Protime-INR    Case Request   2. Osteochondritis dissecans of right knee       I discussed with patient the results of her MRI.  I explained that there could be ongoing symptoms due to the osteochondritis dissecans lesion versus a medial meniscus tear resulting in symptoms.  I offered her options of partial knee replacement versus knee arthroscopy.  I explained that either intervention is not guaranteed to improve her symptoms.  After discussion the risks and benefits of both options, the patient elected to proceed with diagnostic knee arthroscopy with partial medial meniscectomy.    The patient has clinical and radiographic evidence of right knee medial meniscal tear which is severely restricting the patient's activities as well as quality of life. I had an extensive discussion with the patient/family regarding treatment options taking into account the nature of the diagnosis,  the patient's prior level of cognitive and physical function, medical comorbidities, and goals for treatment.  The recommendation at this time is to proceed with a right knee diagnostic arthroscopy with partial medial meniscectomy with the goal to improve patient function, decrease pain, and improve mobility. The risks, benefits, potential complications, and alternatives were discussed with the patient in detail. Risks included but were not limited to bleeding, infection, anesthesia risks, damage to neurovascular structures, recurrent injury, continued pain and acceleration of arthritis, pain, continued pain, iatrogenic fracture, possible need for future surgery including the potential for amputation, blood clots, myocardial infarction, stroke, and death. Laine-operative blood management and the potential for blood transfusion were discussed with risks and options clearly outlined. Specific details of the surgical procedure, hospitalization, recovery, rehabilitation, and long-term precautions were also presented. Pre-operative teaching was provided. Implant/prosthesis and equipment selection was outlined, and the many options available were explained; the final choice will be made at the time of the procedure to match the anatomy and condition of the bone, ligaments, tendons, and muscles. Given this instruction, the patient elected to proceed with the right knee arthroscopy with partial medial meniscectomy. The patient will be seen by pre-admission testing for pre-operative optimization and risk assessment and will be scheduled for surgery once this is completed.          Steve Mauro MD  11/27/18  9:29 AM

## 2018-11-28 ENCOUNTER — TELEPHONE (OUTPATIENT)
Dept: ORTHOPEDIC SURGERY | Facility: CLINIC | Age: 48
End: 2018-11-28

## 2018-11-28 NOTE — TELEPHONE ENCOUNTER
Regarding: Non-Urgent Medical Question  ----- Message from Mychart, Generic sent at 11/28/2018 11:17 AM EST -----    I was wondering if we would be looking for any other defects/damage to my knee when doing the arthroscopic meniscus repair? I figured if we we are  going in we could visualize other parts of the knee.   My  asked if there was any repair that can be done on the knee cyst while performing this surgery?  Thanks  Eugenia

## 2018-12-02 DIAGNOSIS — R25.1 TREMOR: ICD-10-CM

## 2018-12-03 RX ORDER — ESCITALOPRAM OXALATE 20 MG/1
TABLET ORAL
Qty: 30 TABLET | Refills: 2 | Status: SHIPPED | OUTPATIENT
Start: 2018-12-03 | End: 2019-02-25 | Stop reason: SDUPTHER

## 2018-12-03 RX ORDER — PROPRANOLOL HCL 60 MG
CAPSULE, EXTENDED RELEASE 24HR ORAL
Qty: 30 CAPSULE | Refills: 2 | Status: SHIPPED | OUTPATIENT
Start: 2018-12-03 | End: 2019-02-25 | Stop reason: SDUPTHER

## 2018-12-04 ENCOUNTER — TELEPHONE (OUTPATIENT)
Dept: ORTHOPEDIC SURGERY | Facility: CLINIC | Age: 48
End: 2018-12-04

## 2018-12-04 NOTE — TELEPHONE ENCOUNTER
CALLED PATIENT TO ADVISE THAT SURGERY ARRIVAL TIME HAS CHANGED TO 8:00AM ON 12/5/18. NO ANSWER, LEFT MESSAGE WITH DETAILS AND REQUEST TO RETURN CALL CONFIRMING RECEIPT OF MESSAGE.

## 2018-12-05 ENCOUNTER — ANESTHESIA EVENT (OUTPATIENT)
Dept: PERIOP | Facility: HOSPITAL | Age: 48
End: 2018-12-05

## 2018-12-05 ENCOUNTER — ANESTHESIA (OUTPATIENT)
Dept: PERIOP | Facility: HOSPITAL | Age: 48
End: 2018-12-05

## 2018-12-05 ENCOUNTER — HOSPITAL ENCOUNTER (OUTPATIENT)
Facility: HOSPITAL | Age: 48
Setting detail: HOSPITAL OUTPATIENT SURGERY
Discharge: HOME | End: 2018-12-05
Attending: ORTHOPAEDIC SURGERY | Admitting: ORTHOPAEDIC SURGERY

## 2018-12-05 VITALS
HEART RATE: 56 BPM | DIASTOLIC BLOOD PRESSURE: 88 MMHG | RESPIRATION RATE: 16 BRPM | TEMPERATURE: 97.2 F | OXYGEN SATURATION: 98 % | SYSTOLIC BLOOD PRESSURE: 130 MMHG

## 2018-12-05 DIAGNOSIS — M23.203 OLD COMPLEX TEAR OF MEDIAL MENISCUS OF RIGHT KNEE: ICD-10-CM

## 2018-12-05 DIAGNOSIS — M95.8 OSTEOCHONDRAL DEFECT: Primary | ICD-10-CM

## 2018-12-05 LAB
GLUCOSE BLDC GLUCOMTR-MCNC: 90 MG/DL (ref 70–130)
POTASSIUM BLDA-SCNC: 4 MMOL/L (ref 3.5–5.3)

## 2018-12-05 PROCEDURE — 25010000003 CEFAZOLIN IN DEXTROSE 2-4 GM/100ML-% SOLUTION: Performed by: ORTHOPAEDIC SURGERY

## 2018-12-05 PROCEDURE — 25010000002 MIDAZOLAM PER 1 MG: Performed by: ANESTHESIOLOGY

## 2018-12-05 PROCEDURE — 29879 ARTHRS KNE SRG ABRASJ ARTHRP: CPT | Performed by: ORTHOPAEDIC SURGERY

## 2018-12-05 PROCEDURE — 25010000002 DEXAMETHASONE SODIUM PHOSPHATE 10 MG/ML SOLUTION: Performed by: ANESTHESIOLOGY

## 2018-12-05 PROCEDURE — 25010000002 MIDAZOLAM PER 1 MG: Performed by: ORTHOPAEDIC SURGERY

## 2018-12-05 PROCEDURE — 25010000002 FENTANYL CITRATE (PF) 100 MCG/2ML SOLUTION: Performed by: NURSE ANESTHETIST, CERTIFIED REGISTERED

## 2018-12-05 PROCEDURE — 25010000002 DEXAMETHASONE PER 1 MG: Performed by: NURSE ANESTHETIST, CERTIFIED REGISTERED

## 2018-12-05 PROCEDURE — 25010000002 ONDANSETRON PER 1 MG: Performed by: NURSE ANESTHETIST, CERTIFIED REGISTERED

## 2018-12-05 PROCEDURE — 25010000002 BUPRENORPHINE PER 0.1 MG: Performed by: ANESTHESIOLOGY

## 2018-12-05 PROCEDURE — 25010000002 PROPOFOL 10 MG/ML EMULSION: Performed by: NURSE ANESTHETIST, CERTIFIED REGISTERED

## 2018-12-05 PROCEDURE — 84132 ASSAY OF SERUM POTASSIUM: CPT | Performed by: ANESTHESIOLOGY

## 2018-12-05 PROCEDURE — 25010000002 PROMETHAZINE PER 50 MG: Performed by: NURSE ANESTHETIST, CERTIFIED REGISTERED

## 2018-12-05 PROCEDURE — 29881 ARTHRS KNE SRG MNISECTMY M/L: CPT | Performed by: ORTHOPAEDIC SURGERY

## 2018-12-05 PROCEDURE — 82962 GLUCOSE BLOOD TEST: CPT

## 2018-12-05 RX ORDER — MIDAZOLAM HYDROCHLORIDE 1 MG/ML
2 INJECTION INTRAMUSCULAR; INTRAVENOUS ONCE
Status: COMPLETED | OUTPATIENT
Start: 2018-12-05 | End: 2018-12-05

## 2018-12-05 RX ORDER — BUPIVACAINE HYDROCHLORIDE 2.5 MG/ML
INJECTION, SOLUTION EPIDURAL; INFILTRATION; INTRACAUDAL
Status: COMPLETED | OUTPATIENT
Start: 2018-12-05 | End: 2018-12-05

## 2018-12-05 RX ORDER — PROMETHAZINE HYDROCHLORIDE 25 MG/ML
6.25 INJECTION, SOLUTION INTRAMUSCULAR; INTRAVENOUS ONCE AS NEEDED
Status: COMPLETED | OUTPATIENT
Start: 2018-12-05 | End: 2018-12-05

## 2018-12-05 RX ORDER — LIDOCAINE HYDROCHLORIDE 10 MG/ML
INJECTION, SOLUTION INFILTRATION; PERINEURAL AS NEEDED
Status: DISCONTINUED | OUTPATIENT
Start: 2018-12-05 | End: 2018-12-05 | Stop reason: SURG

## 2018-12-05 RX ORDER — PROPOFOL 10 MG/ML
VIAL (ML) INTRAVENOUS CONTINUOUS PRN
Status: DISCONTINUED | OUTPATIENT
Start: 2018-12-05 | End: 2018-12-05 | Stop reason: SURG

## 2018-12-05 RX ORDER — ACETAMINOPHEN 500 MG
1000 TABLET ORAL ONCE
Status: COMPLETED | OUTPATIENT
Start: 2018-12-05 | End: 2018-12-05

## 2018-12-05 RX ORDER — PROMETHAZINE HYDROCHLORIDE 25 MG/1
25 TABLET ORAL ONCE AS NEEDED
Status: COMPLETED | OUTPATIENT
Start: 2018-12-05 | End: 2018-12-05

## 2018-12-05 RX ORDER — EPHEDRINE SULFATE 50 MG/ML
5 INJECTION, SOLUTION INTRAVENOUS ONCE AS NEEDED
Status: DISCONTINUED | OUTPATIENT
Start: 2018-12-05 | End: 2018-12-05 | Stop reason: HOSPADM

## 2018-12-05 RX ORDER — PROMETHAZINE HYDROCHLORIDE 25 MG/1
25 SUPPOSITORY RECTAL ONCE AS NEEDED
Status: COMPLETED | OUTPATIENT
Start: 2018-12-05 | End: 2018-12-05

## 2018-12-05 RX ORDER — HYDROCODONE BITARTRATE AND ACETAMINOPHEN 5; 325 MG/1; MG/1
1 TABLET ORAL ONCE AS NEEDED
Status: DISCONTINUED | OUTPATIENT
Start: 2018-12-05 | End: 2018-12-05 | Stop reason: HOSPADM

## 2018-12-05 RX ORDER — SODIUM CHLORIDE 0.9 % (FLUSH) 0.9 %
3-10 SYRINGE (ML) INJECTION AS NEEDED
Status: DISCONTINUED | OUTPATIENT
Start: 2018-12-05 | End: 2018-12-05 | Stop reason: HOSPADM

## 2018-12-05 RX ORDER — HYDROCODONE BITARTRATE AND ACETAMINOPHEN 5; 325 MG/1; MG/1
1-2 TABLET ORAL EVERY 4 HOURS PRN
Qty: 40 TABLET | Refills: 0 | Status: SHIPPED | OUTPATIENT
Start: 2018-12-05 | End: 2019-03-26

## 2018-12-05 RX ORDER — SCOLOPAMINE TRANSDERMAL SYSTEM 1 MG/1
1 PATCH, EXTENDED RELEASE TRANSDERMAL
Status: DISCONTINUED | OUTPATIENT
Start: 2018-12-05 | End: 2018-12-05 | Stop reason: HOSPADM

## 2018-12-05 RX ORDER — SODIUM CHLORIDE 0.9 % (FLUSH) 0.9 %
3 SYRINGE (ML) INJECTION EVERY 12 HOURS SCHEDULED
Status: DISCONTINUED | OUTPATIENT
Start: 2018-12-05 | End: 2018-12-05 | Stop reason: HOSPADM

## 2018-12-05 RX ORDER — FAMOTIDINE 20 MG/1
20 TABLET, FILM COATED ORAL ONCE
Status: COMPLETED | OUTPATIENT
Start: 2018-12-05 | End: 2018-12-05

## 2018-12-05 RX ORDER — CEFAZOLIN SODIUM 2 G/100ML
2 INJECTION, SOLUTION INTRAVENOUS ONCE
Status: COMPLETED | OUTPATIENT
Start: 2018-12-05 | End: 2018-12-05

## 2018-12-05 RX ORDER — ONDANSETRON 2 MG/ML
INJECTION INTRAMUSCULAR; INTRAVENOUS AS NEEDED
Status: DISCONTINUED | OUTPATIENT
Start: 2018-12-05 | End: 2018-12-05 | Stop reason: SURG

## 2018-12-05 RX ORDER — PROPOFOL 10 MG/ML
VIAL (ML) INTRAVENOUS AS NEEDED
Status: DISCONTINUED | OUTPATIENT
Start: 2018-12-05 | End: 2018-12-05 | Stop reason: SURG

## 2018-12-05 RX ORDER — BUPIVACAINE HYDROCHLORIDE AND EPINEPHRINE 2.5; 5 MG/ML; UG/ML
INJECTION, SOLUTION EPIDURAL; INFILTRATION; INTRACAUDAL; PERINEURAL AS NEEDED
Status: DISCONTINUED | OUTPATIENT
Start: 2018-12-05 | End: 2018-12-05 | Stop reason: HOSPADM

## 2018-12-05 RX ORDER — BUPRENORPHINE HYDROCHLORIDE 0.32 MG/ML
INJECTION INTRAMUSCULAR; INTRAVENOUS
Status: COMPLETED | OUTPATIENT
Start: 2018-12-05 | End: 2018-12-05

## 2018-12-05 RX ORDER — SODIUM CHLORIDE, SODIUM LACTATE, POTASSIUM CHLORIDE, AND CALCIUM CHLORIDE .6; .31; .03; .02 G/100ML; G/100ML; G/100ML; G/100ML
INJECTION, SOLUTION INTRAVENOUS AS NEEDED
Status: DISCONTINUED | OUTPATIENT
Start: 2018-12-05 | End: 2018-12-05 | Stop reason: HOSPADM

## 2018-12-05 RX ORDER — FENTANYL CITRATE 50 UG/ML
50 INJECTION, SOLUTION INTRAMUSCULAR; INTRAVENOUS
Status: DISCONTINUED | OUTPATIENT
Start: 2018-12-05 | End: 2018-12-05 | Stop reason: HOSPADM

## 2018-12-05 RX ORDER — DOCUSATE SODIUM 100 MG/1
100 CAPSULE, LIQUID FILLED ORAL 2 TIMES DAILY PRN
Qty: 60 CAPSULE | Refills: 1 | Status: SHIPPED | OUTPATIENT
Start: 2018-12-05 | End: 2019-10-16

## 2018-12-05 RX ORDER — MELOXICAM 7.5 MG/1
15 TABLET ORAL ONCE
Status: COMPLETED | OUTPATIENT
Start: 2018-12-05 | End: 2018-12-05

## 2018-12-05 RX ORDER — PREGABALIN 75 MG/1
75 CAPSULE ORAL ONCE
Status: COMPLETED | OUTPATIENT
Start: 2018-12-05 | End: 2018-12-05

## 2018-12-05 RX ORDER — DEXAMETHASONE SODIUM PHOSPHATE 4 MG/ML
INJECTION, SOLUTION INTRA-ARTICULAR; INTRALESIONAL; INTRAMUSCULAR; INTRAVENOUS; SOFT TISSUE AS NEEDED
Status: DISCONTINUED | OUTPATIENT
Start: 2018-12-05 | End: 2018-12-05 | Stop reason: SURG

## 2018-12-05 RX ORDER — SODIUM CHLORIDE, SODIUM LACTATE, POTASSIUM CHLORIDE, CALCIUM CHLORIDE 600; 310; 30; 20 MG/100ML; MG/100ML; MG/100ML; MG/100ML
9 INJECTION, SOLUTION INTRAVENOUS CONTINUOUS
Status: DISCONTINUED | OUTPATIENT
Start: 2018-12-05 | End: 2018-12-05 | Stop reason: HOSPADM

## 2018-12-05 RX ORDER — LIDOCAINE HYDROCHLORIDE 10 MG/ML
0.5 INJECTION, SOLUTION EPIDURAL; INFILTRATION; INTRACAUDAL; PERINEURAL ONCE AS NEEDED
Status: COMPLETED | OUTPATIENT
Start: 2018-12-05 | End: 2018-12-05

## 2018-12-05 RX ORDER — FAMOTIDINE 10 MG/ML
20 INJECTION, SOLUTION INTRAVENOUS ONCE
Status: CANCELLED | OUTPATIENT
Start: 2018-12-05 | End: 2018-12-05

## 2018-12-05 RX ORDER — DEXAMETHASONE SODIUM PHOSPHATE 10 MG/ML
INJECTION, SOLUTION INTRAMUSCULAR; INTRAVENOUS
Status: COMPLETED | OUTPATIENT
Start: 2018-12-05 | End: 2018-12-05

## 2018-12-05 RX ORDER — OXYCODONE HYDROCHLORIDE AND ACETAMINOPHEN 5; 325 MG/1; MG/1
1 TABLET ORAL ONCE AS NEEDED
Status: DISCONTINUED | OUTPATIENT
Start: 2018-12-05 | End: 2018-12-05 | Stop reason: HOSPADM

## 2018-12-05 RX ADMIN — FENTANYL CITRATE 50 MCG: 50 INJECTION, SOLUTION INTRAMUSCULAR; INTRAVENOUS at 13:37

## 2018-12-05 RX ADMIN — CEFAZOLIN SODIUM 2 G: 2 INJECTION, SOLUTION INTRAVENOUS at 11:26

## 2018-12-05 RX ADMIN — MIDAZOLAM HYDROCHLORIDE 2 MG: 1 INJECTION, SOLUTION INTRAMUSCULAR; INTRAVENOUS at 09:47

## 2018-12-05 RX ADMIN — PROMETHAZINE HYDROCHLORIDE 6.25 MG: 25 INJECTION INTRAMUSCULAR; INTRAVENOUS at 14:06

## 2018-12-05 RX ADMIN — DEXAMETHASONE SODIUM PHOSPHATE 2 MG: 10 INJECTION, SOLUTION INTRAMUSCULAR; INTRAVENOUS at 11:33

## 2018-12-05 RX ADMIN — MIDAZOLAM HYDROCHLORIDE 2 MG: 1 INJECTION, SOLUTION INTRAMUSCULAR; INTRAVENOUS at 11:11

## 2018-12-05 RX ADMIN — ONDANSETRON 4 MG: 2 INJECTION INTRAMUSCULAR; INTRAVENOUS at 12:35

## 2018-12-05 RX ADMIN — PROPOFOL 200 MG: 10 INJECTION, EMULSION INTRAVENOUS at 11:30

## 2018-12-05 RX ADMIN — FAMOTIDINE 20 MG: 20 TABLET, FILM COATED ORAL at 09:24

## 2018-12-05 RX ADMIN — ACETAMINOPHEN 1000 MG: 500 TABLET ORAL at 09:23

## 2018-12-05 RX ADMIN — BUPRENORPHINE HYDROCHLORIDE 0.3 MG: 0.32 INJECTION INTRAMUSCULAR; INTRAVENOUS at 11:33

## 2018-12-05 RX ADMIN — MELOXICAM 15 MG: 7.5 TABLET ORAL at 09:23

## 2018-12-05 RX ADMIN — SCOPALAMINE 1 PATCH: 1 PATCH, EXTENDED RELEASE TRANSDERMAL at 09:46

## 2018-12-05 RX ADMIN — SODIUM CHLORIDE, POTASSIUM CHLORIDE, SODIUM LACTATE AND CALCIUM CHLORIDE 9 ML/HR: 600; 310; 30; 20 INJECTION, SOLUTION INTRAVENOUS at 09:30

## 2018-12-05 RX ADMIN — DEXAMETHASONE SODIUM PHOSPHATE 8 MG: 4 INJECTION, SOLUTION INTRAMUSCULAR; INTRAVENOUS at 11:30

## 2018-12-05 RX ADMIN — LIDOCAINE HYDROCHLORIDE 100 MG: 10 INJECTION, SOLUTION INFILTRATION; PERINEURAL at 11:30

## 2018-12-05 RX ADMIN — PREGABALIN 75 MG: 75 CAPSULE ORAL at 09:24

## 2018-12-05 RX ADMIN — SODIUM CHLORIDE, POTASSIUM CHLORIDE, SODIUM LACTATE AND CALCIUM CHLORIDE: 600; 310; 30; 20 INJECTION, SOLUTION INTRAVENOUS at 12:39

## 2018-12-05 RX ADMIN — LIDOCAINE HYDROCHLORIDE 0.5 ML: 10 INJECTION, SOLUTION EPIDURAL; INFILTRATION; INTRACAUDAL; PERINEURAL at 09:30

## 2018-12-05 RX ADMIN — PROPOFOL 150 MCG/KG/MIN: 10 INJECTION, EMULSION INTRAVENOUS at 11:30

## 2018-12-05 RX ADMIN — BUPIVACAINE HYDROCHLORIDE 30 ML: 2.5 INJECTION, SOLUTION EPIDURAL; INFILTRATION; INTRACAUDAL; PERINEURAL at 11:33

## 2018-12-05 NOTE — ANESTHESIA PROCEDURE NOTES
Peripheral Block      Patient reassessed immediately prior to procedure    Patient location during procedure: post-op  Reason for block: at surgeon's request and post-op pain management  Performed by  Anesthesiologist: Zurdo Jesus MD  Preanesthetic Checklist  Completed: patient identified, site marked, surgical consent, pre-op evaluation, timeout performed, IV checked, risks and benefits discussed and monitors and equipment checked  Prep:  Pt Position: supine  Sterile barriers:cap, gloves, mask and sterile barriers  Prep: ChloraPrep  Patient monitoring: blood pressure monitoring, continuous pulse oximetry and EKG  Procedure  Performed under: general  Guidance:ultrasound guided  Images:still images obtained    Laterality:right  Block Type:adductor canal block  Injection Technique:single-shot  Needle Type:echogenic and long-bevel  Needle Gauge:20 G  Resistance on Injection: none  Catheter size: 20g.  Medications Used: buprenorphine (BUPRENEX) injection, 0.3 mg  dexamethasone sodium phosphate injection, 2 mg  bupivacaine PF (MARCAINE) 0.25 % injection, 30 mL  Med admintered at 12/5/2018 11:33 AM  Medications  Preservative Free Saline:5ml    Post Assessment  Injection Assessment: negative aspiration for heme, incremental injection and no paresthesia on injection  Patient Tolerance:comfortable throughout block  Complications:no  Additional Notes  Procedure:             The pt was placed in the Supine position.  The Insertion site was  prepped and Draped in sterile fashion.  The pt was anesthetized with  IV Sedation( see meds).  Skin and cutaneous tissue was infiltrated and anesthetized with 1% Lidocaine 3 mls via a 25g needle.  A BBraun 4 inch 18g echogenic needle was then  inserted approximately midline, mid-thigh and advanced In-plane with Ultrasound guidance.  Normal Saline PSF was utilized for hydrodissection of tissue.  The Vastus medialis and Sartorius muscle where visualized and the needle tip was placed in  the adductor canal,  lateral to the femoral artery.  LA injection spread was visualized, injection was incremental 1-5ml, injection pressure was normal or little, no intraneural injection, no vascular injection.  LA dose was injected thru the needle(see dose above).

## 2018-12-05 NOTE — ANESTHESIA POSTPROCEDURE EVALUATION
Patient: Eugenia Naik    Procedure Summary     Date:  12/05/18 Room / [facility]:   [facility]    Anesthesia Start:  1126 Anesthesia Stop:  1250    Procedure:  KNEE ARTHROSCOPY RIGHT (Right Knee) Diagnosis:       Old complex tear of medial meniscus of right knee      (Old complex tear of medial meniscus of right knee [M23.203])    Surgeon:  Steve Mauro MD Provider:  Zurdo Jesus MD    Anesthesia Type:  general ASA Status:  2          Anesthesia Type: general  Last vitals  BP   111/79 (12/05/18 1247)   Temp   97.2 °F (36.2 °C) (12/05/18 1247)   Pulse   62 (12/05/18 1247)   Resp   16 (12/05/18 1247)     SpO2   98 % (12/05/18 1247)     Post Anesthesia Care and Evaluation    Patient location during evaluation: PACU  Patient participation: waiting for patient participation  Level of consciousness: awake  Pain score: 0  Pain management: adequate  Airway patency: patent  Anesthetic complications: No anesthetic complications  PONV Status: none  Cardiovascular status: hemodynamically stable and acceptable  Respiratory status: nonlabored ventilation, acceptable and nasal cannula  Hydration status: acceptable

## 2018-12-05 NOTE — ANESTHESIA PREPROCEDURE EVALUATION
Anesthesia Evaluation     history of anesthetic complications: PONV               Airway   Mallampati: I  TM distance: >3 FB  Neck ROM: full  No difficulty expected  Dental      Pulmonary    Cardiovascular     (+) hyperlipidemia,       Neuro/Psych  (+) headaches, dizziness/light headedness, tremors,     GI/Hepatic/Renal/Endo    (+)  GERD,      Musculoskeletal     Abdominal    Substance History      OB/GYN          Other                        Anesthesia Plan    ASA 2     general   (Acb)  intravenous induction   Anesthetic plan, all risks, benefits, and alternatives have been provided, discussed and informed consent has been obtained with: patient.    Plan discussed with CRNA.

## 2018-12-05 NOTE — INTERVAL H&P NOTE
Pre-Op H&P (See Recent Office Note Attached for Full H&P)    Chief complaint: Right knee pain    HPI:      Patient is a 48 y.o. female who presents with persistent ongoing right knee pain with a known osteochondritis dissecans lesion in the right knee. She rates her pain 5/10 on pain scale.  Pain continues to be localized the medial joint line and medial proximal tibia.  She is ambulating with no assistive device. MRI shows evidence of an improved appearance of the osteochondritis dissecans lesion of the anterior medial tibia.  Surrounding bony edema is improved compared to prior MRI.  There is evidence of signal abnormality along the medial meniscus concerning for possible medial meniscal tear.  There is slight medial meniscal extrusion.  She has elected to proceed with a right knee arthroscopy.        Review of Systems:  General ROS:  no fever, chills, rashes, No change since last office visit  Cardiovascular ROS: no chest pain or dyspnea on exertion; +hyperlipidemia  Respiratory ROS: no cough, shortness of breath, or wheezing      Meds:    No current facility-administered medications on file prior to encounter.      Current Outpatient Medications on File Prior to Encounter   Medication Sig Dispense Refill   • ALPRAZolam (XANAX) 0.5 MG tablet Take one tablet at bedtime (Patient taking differently: Take 0.5 mg by mouth every night at bedtime. Take one tablet at bedtime) 30 tablet 2   • atorvastatin (LIPITOR) 10 MG tablet TAKE ONE TABLET BY MOUTH DAILY 90 tablet 1   • buPROPion SR (WELLBUTRIN SR) 150 MG 12 hr tablet TAKE ONE TABLET BY MOUTH TWICE A DAY (Patient taking differently: TAKE ONE TABLET BY MOUTH daily) 60 tablet 4   • meclizine (ANTIVERT) 25 MG tablet Take 25 mg by mouth as needed for dizziness.     • omeprazole (priLOSEC) 20 MG capsule Take 20 mg by mouth Every Other Day.     • polyethylene glycol (MIRALAX) powder DISSOLVE 17 GRAMS IN 8 OUNCES OF LIQUID AND DRINK ONCE A  g 0   • [DISCONTINUED]  atorvastatin (LIPITOR) 10 MG tablet Take 10 mg by mouth Every Night.     • [DISCONTINUED] buPROPion SR (WELLBUTRIN SR) 150 MG 12 hr tablet Take 150 mg by mouth Daily.     • [DISCONTINUED] escitalopram (LEXAPRO) 20 MG tablet Take 20 mg by mouth Daily.     • [DISCONTINUED] estradiol (CLIMARA) 0.1 MG/24HR patch Place 1 patch on the skin 1 (One) Time Per Week. (Patient taking differently: Place 1 patch on the skin 1 (One) Time Per Week. 0.5 MG BiWeekly) 4 patch 12   • [DISCONTINUED] meloxicam (MOBIC) 15 MG tablet TAKE ONE TABLET BY MOUTH DAILY WITH FOOD 30 tablet 0   • [DISCONTINUED] polyethylene glycol (MIRALAX) powder Take 17 g by mouth Daily.     • [DISCONTINUED] promethazine (PHENERGAN) 25 MG tablet Take 25 mg by mouth Every 4 (Four) Hours As Needed for Nausea or Vomiting.     • [DISCONTINUED] propranolol LA (INDERAL LA) 60 MG 24 hr capsule Take 60 mg by mouth Daily.         Vital Signs:  /74 (BP Location: Right arm, Patient Position: Lying)   Pulse 69   Temp 98 °F (36.7 °C) (Temporal)   Resp 16   LMP 04/07/2018 Comment: LAST MAMMOGRAM 6/2018  SpO2 100%   Breastfeeding? No     Physical Exam:    CV:  S1S2 regular rate and rhythm, no murmur               Resp:  Clear to auscultation; respirations regular, even and unlabored    Results Review:   I have reviewed the patient's recent clinical results.    Cancer Staging (if applicable)  Cancer Patient: __ yes _X_no __unknown; If yes, clinical stage T:__ N:__M:__, stage group or __N/A    Assessment: Primary osteoarthritis of right knee    Plan: Right knee arthroscopy      Nichelle Delarosa, APRN  12/5/2018   9:34 AM

## 2018-12-05 NOTE — BRIEF OP NOTE
KNEE ARTHROSCOPY  Progress Note    Eugenia Naik  12/5/2018    Pre-op Diagnosis:   Old complex tear of medial meniscus of right knee [M23.203]       Post-Op Diagnosis Codes:     * Old complex tear of medial meniscus of right knee [M23.203]     * Osteochondral defect [M95.8]    Procedure/CPT® Codes:  OR KNEE SCOPE,MED/LAT MENISECTOMY [18527]  OR KNEE SCOPE,ABRASN ARTHROPLASTY [97047]    Procedure(s):  KNEE ARTHROSCOPY RIGHT    Surgeon(s):  Steve Mauro MD    Anesthesia: Choice    Staff:   Circulator: Arian Rivera RN; Andra Torres RN  Scrub Person: Andra Wilson; Fabrice Mckeon  Nursing Assistant: Glen Pabon    Estimated Blood Loss: minimal    Urine Voided: * No values recorded between 12/5/2018 11:24 AM and 12/5/2018 12:47 PM *    Specimens:                None      Drains:      Findings: medial mensical tear, osteochondral lesion medial tibial plateau    Complications: None apparent      Steve Mauro MD     Date: 12/5/2018  Time: 1:00 PM

## 2018-12-05 NOTE — OP NOTE
OPERATIVE REPORT     DATE OF PROCEDURE: 12/5/18     SURGEON: Steve Mauro M.D.     ASSISTANT(S): Circulator: Arian Rivera RN; Andra Torres RN  Scrub Person: Andra Wilson; Fabrice Mckeon  Nursing Assistant: Glen Pabon    PREOPERATIVE DIAGNOSIS: Right knee medial meniscal tear     POSTOPERATIVE DIAGNOSIS: same plus tibial osteochondral lesion    PROCEDURE: Right knee diagnostic arthroscopy with microfracture of medial tibial osteochondral lesion, partial medial meniscectomy     SURGICAL DETAILS:     APPROACH: Standard arthroscopic anteromedial and anterolateral portals     ANESTHESIA: General plus local     PREOPERATIVE ANTIBIOTICS: Ancef 2 g IV     ESTIMATED BLOOD LOSS: 10 cc     TOURNIQUET TIME: 31 min @ 300 mmHg     SPECIMENS: None     IMPLANTS: None     DRAINS: None     LOCAL INJECTION: 0.25% Marcaine with epinephrine     MODIFIER(S): None     COMPLICATIONS: None apparent     INDICATIONS FOR PROCEDURE: Ms. Naik is a 48-year-old female who presented to clinic complaining of persistent medial-based knee pain.  She had failed conservative management including anti-inflammatories, cortisone injections, and modified activity.  An MRI was obtained which showed evidence of a medial meniscal tear as well as a osteochondral lesion involving the medial anterior tibial plateau.  Based on these MRI findings, options were given to the patient cleaning a partial knee replacement versus a knee arthroscopy.  Both choices were offered as well as the risks and benefits and no guarantees were made for symptom relief with either intervention.  Ultimately, the patient elects to proceed with more conservative knee arthroscopy approach.  Recommendation was to proceed with diagnostic arthroscopy with partial medial meniscectomy and any additional procedures as needed.  The risks, benefits, alternatives, and potential complications of surgery were discussed with the patient in detail to include but not  limited to infection, bleeding, anesthesia risks, nerve injury, vessel injury, pain, blood clots, possible need for blood transfusion, possible need for further procedures, myocardial infarction, stroke, and death.  Specific risks for this surgery also include acceleration of arthritis, recurrent meniscus tear, persistent pain. Specific details of the procedure, hospitalization, recovery, rehabilitation, and long-term precautions were also provided. Pre-operative teaching was provided. Surgical device selection was outlined, and the many options available were explained; final choices will be made at the time of the procedure to match the anatomy and condition of the bone, and soft tissue structures. Understanding of all topics was conveyed to me by the patient, and consent was given to proceed with a right knee diagnostic arthroscopy with partial medial meniscectomy.     INTRAOPERATIVE FINDINGS:  Lateral compartment: Grade 0 lateral femoral condyle and tibial plateau changes, no evidence of lateral meniscal tear   Medial Compartment: Diffuse grade 3 changes involving the medial femoral condyle, grade 1 and 2 changes diffusely throughout the medial tibial plateau with focal grade 4 lesion measuring 3 x 3 mm in the anterior medial tibial plateau, small parrot-beak meniscal edge tear involving the mid body of meniscus  Patellofemoral Compartment: Grade 2 changes diffusely throughout the trochlea and patellar facets   Cruciate Ligaments: Intact ACL and PCL   Synovium: Not evidence of synovitis, symptomatic plica, or adhesions   Other: No loose bodies     PROCEDURE: The patient was identified in the preoperative holding area. The operative site was confirmed and marked. A sequential compression device was placed on the nonoperative leg. The risks, benefits, and alternatives to surgery were again confirmed with the patient and the patient wished to proceed. The patient was brought to the operating room and placed on the  operating room table in the supine position. All bony prominences were padded. A huddle was performed with the patient and all vital surgical team members to confirm the correct operative site, procedure, anesthesia type, and operative plan with the patient. After anesthesia was performed, a full knee exam was performed on the operative leg. Range of motion was 0 to 125°. The knee was stable to varus/valgus stress at terminal extension, 30 degrees flexion and 90 degress flexion.  Negative Lachman's, anterior and posterior drawer. Intravenous antibiotic prophylaxis was given and confirmed with the anesthesia team. A nonsterile tourniquet was applied to the upper thigh of the operative leg and the operative leg was secured in the arthroscopic leg schmid. The contralateral leg was placed in a flexed and abducted position and secured in the well leg schmid. The foot of the OR table was then dropped to allow the operative leg to hang freely.     The operative leg was then prepped and draped in usual sterile fashion while the surgeon scrubbed and donned sterile gown and gloves. A surgical time out was performed immediately preceding the incision with all personnel in the operating room to confirm patient identity, the correct operative site and extremity, correct radiographic studies, availability of appropriate surgical equipment and agreement on the planned procedure. Attention was then turned to the operative knee, which was exanguinated with an esmarch and the tourniquet was inflated. A standard anterolateral portal was created using an 11 blade. A blunt trocar was then placed through the knee capsule into the intracondylar notch. The trocar was then removed followed by placement of the arthroscopic camera. Next, the anteromedial portal was created just above the meniscocapsular junction under direct visualization, taking care to avoid injury to the meniscus. A diagnostic arthroscopy then proceeded with the above  recorded findings. A probe was utilized to during the diagnostic arthroscopy to further define the pathology.     Once the diagnostic portion was complete, attention was turned to addressing the intra-articular pathology. Utilizing an arthroscopic shaver, the unstable medial meniscal tear was debrided to a stable base. Stability was verified using a probe. Once this was completed, attention was turned to the osteochondral lesion involving the medial anterior portion of the tibial plateau.  Using the microfracture instruments, a series of small microfractures were made into the subchondral bony surface.  Upon deflation of the tourniquet, bleeding from these microfracture points was identified.  Satisfied with this, a final exploration of the joint was performed. Any loose meniscal fragments were removed. The joint was then flushed thoroughly with arthroscopic fluid, followed by removal of any excess arthroscopic fluid from the knee. The portals were then closed using a #3-0 nylon stitch. The intra-articular Marcaine injection was then completed. Steri-Strips and sterile dressings were then applied to the operative knee. The patient was then awakened from anesthesia and transferred to the PACU in stable condition.     No apparent complications occurred during the procedure. Instrument, sponge and needle counts were correct x 2.     POST OPERATIVE PLAN:   Weight Bearing: Toe-touch weightbearing with knee ROM as tolerated.   Therapy/Activity: Ambulate with crutches and wean from crutches as tolerated.  Ice knee for edema control.   Wound Care: Dressing to remain in place until POD#3.  May remove after POD#3 and leave open to air if no drainage.  May shower on POD#6 provided no wound drainage has occurred the previous 72 hours.   Pain control: Norco 5   Follow up: Dr. Mauro 2 weeks.

## 2018-12-20 ENCOUNTER — OFFICE VISIT (OUTPATIENT)
Dept: ORTHOPEDIC SURGERY | Facility: CLINIC | Age: 48
End: 2018-12-20

## 2018-12-20 DIAGNOSIS — M93.261 OSTEOCHONDRITIS DISSECANS OF RIGHT KNEE: Primary | ICD-10-CM

## 2018-12-20 PROCEDURE — 99024 POSTOP FOLLOW-UP VISIT: CPT | Performed by: ORTHOPAEDIC SURGERY

## 2018-12-20 RX ORDER — ALPRAZOLAM 1 MG/1
TABLET ORAL
COMMUNITY
Start: 2018-12-14 | End: 2019-09-26 | Stop reason: SDUPTHER

## 2018-12-20 NOTE — PROGRESS NOTES
Orthopaedic Clinic Note:  Knee Post Op    Chief Complaint   Patient presents with   • Post-op     2 weeks S/P Right Knee Arthroscopy 18        HPI    Ms. Naik is 2  week(s) s/p right knee arthroscopy with partial medial meniscectomy and microfracture for tibial lesion.  She rates her pain 2/10 on pain scale.  She has been nonweightbearing using crutches to assist with mobility since the surgery.  She is taking Norco for pain control.  She denies fevers chills or constitutional symptoms.  She does describe her pain as an aching sensation with occasional burning.    Past Medical History:   Diagnosis Date   • Acute maxillary sinusitis    • Anxiety and depression    • Arthralgia    • Arthritis     knee and foot   • Colicky RUQ abdominal pain    • Constipation    • Depression    • Dizziness and giddiness    • Essential tremor     propranolol for this   • GERD (gastroesophageal reflux disease)    • High cholesterol    • Lumbago    • Metatarsalgia, right foot    • PONV (postoperative nausea and vomiting)    • Pustule    • Spinal headache     with csection   • Tremor    • Wears reading eyeglasses       Past Surgical History:   Procedure Laterality Date   • ADENOIDECTOMY     •  SECTION     • CHOLECYSTECTOMY     • COLONOSCOPY     • ENDOMETRIAL ABLATION     • KNEE ARTHROSCOPY Right 2018    Procedure: KNEE ARTHROSCOPY RIGHT;  Surgeon: Steve Mauro MD;  Location:  [facility];  Service: Orthopedics   • TONSILLECTOMY     • TOTAL LAPAROSCOPIC HYSTERECTOMY N/A 2018    Procedure: TOTAL LAPAROSCOPIC HYSTERECTOMY WITH DAVINCI WITH BILATERAL SALPINGO OOPHERECTOMY, SKIN TAG REMOVAL FROM INNER LABIA MINORA, CYSTO;  Surgeon: Renetta Artis MD;  Location:  [facility];  Service: Obstetrics/Gynecology     Family History   Problem Relation Age of Onset   • Stroke Mother    • Heart disease Mother    • Arthritis Mother    • Cancer Other    • Heart disease Other    • Depression Other    • Heart attack  Other    • Depression Child    • Anesthesia problems Other    • Rheum arthritis Other    • Hypertension Other    • Hyperlipidemia Daughter       Social History     Socioeconomic History   • Marital status:      Spouse name: Not on file   • Number of children: Not on file   • Years of education: Not on file   • Highest education level: Not on file   Social Needs   • Financial resource strain: Not on file   • Food insecurity - worry: Not on file   • Food insecurity - inability: Not on file   • Transportation needs - medical: Not on file   • Transportation needs - non-medical: Not on file   Occupational History   • Not on file   Tobacco Use   • Smoking status: Never Smoker   • Smokeless tobacco: Never Used   Substance and Sexual Activity   • Alcohol use: Yes     Types: 1 Glasses of wine, 1 Cans of beer per week     Comment: Occ   • Drug use: No   • Sexual activity: Yes     Partners: Male     Birth control/protection: Surgical   Other Topics Concern   • Not on file   Social History Narrative   • Not on file      Current Outpatient Medications on File Prior to Visit   Medication Sig Dispense Refill   • ALPRAZolam (XANAX) 0.5 MG tablet Take one tablet at bedtime (Patient taking differently: Take 0.5 mg by mouth every night at bedtime. Take one tablet at bedtime) 30 tablet 2   • ALPRAZolam (XANAX) 1 MG tablet      • atorvastatin (LIPITOR) 10 MG tablet TAKE ONE TABLET BY MOUTH DAILY 90 tablet 1   • buPROPion SR (WELLBUTRIN SR) 150 MG 12 hr tablet TAKE ONE TABLET BY MOUTH TWICE A DAY (Patient taking differently: TAKE ONE TABLET BY MOUTH daily) 60 tablet 4   • docusate sodium (COLACE) 100 MG capsule Take 1 capsule by mouth 2 (Two) Times a Day As Needed for Constipation. 60 capsule 1   • escitalopram (LEXAPRO) 20 MG tablet TAKE ONE TABLET BY MOUTH DAILY 30 tablet 2   • HYDROcodone-acetaminophen (NORCO) 5-325 MG per tablet Take 1-2 tablets by mouth Every 4 (Four) Hours As Needed (Pain). 40 tablet 0   • meclizine  (ANTIVERT) 25 MG tablet Take 25 mg by mouth as needed for dizziness.     • omeprazole (priLOSEC) 20 MG capsule Take 20 mg by mouth Every Other Day.     • polyethylene glycol (MIRALAX) powder DISSOLVE 17 GRAMS IN 8 OUNCES OF LIQUID AND DRINK ONCE A  g 0   • propranolol LA (INDERAL LA) 60 MG 24 hr capsule TAKE ONE CAPSULE BY MOUTH DAILY 30 capsule 2     No current facility-administered medications on file prior to visit.       No Known Allergies     Review of Systems   Constitutional: Negative.    HENT: Negative.    Eyes: Negative.    Respiratory: Negative.    Cardiovascular: Negative.    Gastrointestinal: Negative.    Endocrine: Negative.    Genitourinary: Negative.    Musculoskeletal: Positive for arthralgias.   Skin: Negative.    Allergic/Immunologic: Negative.    Neurological: Negative.    Hematological: Negative.    Psychiatric/Behavioral: Negative.         Physical Exam  Last menstrual period 04/07/2018, not currently breastfeeding.    There is no height or weight on file to calculate BMI.    GENERAL APPEARANCE: awake, alert, oriented, in no acute distress  LUNGS:  breathing nonlabored  EXTREMITIES: no clubbing, cyanosis  PERIPHERAL PULSES: palpable dorsalis pedis and posterior tibial pulses bilaterally.    GAIT:  Ambulating with crutches           Right Knee Exam:  ----------  ALIGNMENT: neutral  ----------  RANGE OF MOTION:  Normal (0-120 degrees) with no extensor lag or flexion contracture  LIGAMENTOUS STABILITY:   stable to varus and valgus stress at terminal extension and 30 degrees without any evidence of laxity  ----------  STRENGTH:  KNEE FLEXION 5/5  KNEE EXTENSION  5/5  ANKLE DORSIFLEXION  5/5  ANKLE PLANTARFLEXION  5/5  ----------  PAIN WITH PALPATION:medial joint line  KNEE EFFUSION: yes, mild effusion  PAIN WITH KNEE ROM: no  PATELLAR CREPITUS:  yes, asymptomatic  ----------  SENSATION TO LIGHT TOUCH:  DEEP PERONEAL/SUPERFICIAL PERONEAL/SURAL/SAPHENOUS/TIBIAL:    intact, except For some mild  paresthesias localized along the medial mid-third of tibia  ----------  EDEMA:  no  ERYTHEMA:    no  WOUNDS/INCISIONS:  yes, arthroscopic portals are well healed with no erythema or drainage. sutures are in place  _____________________________________________________________________  _____________________________________________________________________    RADIOGRAPHIC FINDINGS:   No new imaging today    Assessment/Plan:   Diagnosis Plan   1. Osteochondritis dissecans of right knee       Patient is continue nonweightbearing and assistance with mobility with crutches for the next 4 weeks.  She is nonweightbearing to allow for fibrocartilage to form from the microfracture site.  I'll see her back in 4 weeks for repeat assessment.  We'll get sutures out today and placed Steri-Strips over the incisions.  She will follow-up in 4 weeks and at that point we will likely initiate weightbearing as tolerated.    Stvee Mauro MD  12/20/18  11:44 AM

## 2018-12-26 DIAGNOSIS — Z00.00 ANNUAL PHYSICAL EXAM: ICD-10-CM

## 2018-12-27 RX ORDER — ATORVASTATIN CALCIUM 10 MG/1
TABLET, FILM COATED ORAL
Qty: 90 TABLET | Refills: 2 | Status: SHIPPED | OUTPATIENT
Start: 2018-12-27 | End: 2019-09-26 | Stop reason: SDUPTHER

## 2019-01-24 ENCOUNTER — OFFICE VISIT (OUTPATIENT)
Dept: ORTHOPEDIC SURGERY | Facility: CLINIC | Age: 49
End: 2019-01-24

## 2019-01-24 DIAGNOSIS — M93.261 OSTEOCHONDRITIS DISSECANS OF RIGHT KNEE: Primary | ICD-10-CM

## 2019-01-24 DIAGNOSIS — M23.203 OLD COMPLEX TEAR OF MEDIAL MENISCUS OF RIGHT KNEE: ICD-10-CM

## 2019-01-24 PROCEDURE — 99024 POSTOP FOLLOW-UP VISIT: CPT | Performed by: ORTHOPAEDIC SURGERY

## 2019-01-24 NOTE — PROGRESS NOTES
Orthopaedic Clinic Note:  Knee Post Op    Chief Complaint   Patient presents with   • Post-op     4 week f/u; 7 weeks status post Right Knee Arthroscopy 2018        HPI    Ms. Naik is 7  week(s) s/p right knee arthroscopy with microfracture. Rates pain 1/10. She is ambulating with no assistive device and is taking meloxicam for pain control. She denies fevers, chills, or constitutional symptoms.  She is continuing outpatient PT. Patient is improving overall.  She denies any complications.  Overall she is happy with her progress..    Past Medical History:   Diagnosis Date   • Acute maxillary sinusitis    • Anxiety and depression    • Arthralgia    • Arthritis     knee and foot   • Colicky RUQ abdominal pain    • Constipation    • Depression    • Dizziness and giddiness    • Essential tremor     propranolol for this   • GERD (gastroesophageal reflux disease)    • High cholesterol    • Lumbago    • Metatarsalgia, right foot    • PONV (postoperative nausea and vomiting)    • Pustule    • Spinal headache     with csection   • Tremor    • Wears reading eyeglasses       Past Surgical History:   Procedure Laterality Date   • ADENOIDECTOMY     •  SECTION     • CHOLECYSTECTOMY     • COLONOSCOPY     • ENDOMETRIAL ABLATION     • KNEE ARTHROSCOPY Right 2018    Procedure: KNEE ARTHROSCOPY RIGHT;  Surgeon: Steve Mauro MD;  Location:  [facility];  Service: Orthopedics   • TONSILLECTOMY     • TOTAL LAPAROSCOPIC HYSTERECTOMY N/A 2018    Procedure: TOTAL LAPAROSCOPIC HYSTERECTOMY WITH DAVINCI WITH BILATERAL SALPINGO OOPHERECTOMY, SKIN TAG REMOVAL FROM INNER LABIA MINORA, CYSTO;  Surgeon: Renetta Artis MD;  Location:  [facility];  Service: Obstetrics/Gynecology     Family History   Problem Relation Age of Onset   • Stroke Mother    • Heart disease Mother    • Arthritis Mother    • Cancer Other    • Heart disease Other    • Depression Other    • Heart attack Other    • Depression Child    •  Anesthesia problems Other    • Rheum arthritis Other    • Hypertension Other    • Hyperlipidemia Daughter       Social History     Socioeconomic History   • Marital status:      Spouse name: Not on file   • Number of children: Not on file   • Years of education: Not on file   • Highest education level: Not on file   Social Needs   • Financial resource strain: Not on file   • Food insecurity - worry: Not on file   • Food insecurity - inability: Not on file   • Transportation needs - medical: Not on file   • Transportation needs - non-medical: Not on file   Occupational History   • Not on file   Tobacco Use   • Smoking status: Never Smoker   • Smokeless tobacco: Never Used   Substance and Sexual Activity   • Alcohol use: Yes     Types: 1 Glasses of wine, 1 Cans of beer per week     Comment: Occ   • Drug use: No   • Sexual activity: Yes     Partners: Male     Birth control/protection: Surgical   Other Topics Concern   • Not on file   Social History Narrative   • Not on file      Current Outpatient Medications on File Prior to Visit   Medication Sig Dispense Refill   • ALPRAZolam (XANAX) 0.5 MG tablet Take one tablet at bedtime (Patient taking differently: Take 0.5 mg by mouth every night at bedtime. Take one tablet at bedtime) 30 tablet 2   • ALPRAZolam (XANAX) 1 MG tablet      • atorvastatin (LIPITOR) 10 MG tablet TAKE ONE TABLET BY MOUTH DAILY 90 tablet 2   • buPROPion SR (WELLBUTRIN SR) 150 MG 12 hr tablet TAKE ONE TABLET BY MOUTH TWICE A DAY (Patient taking differently: TAKE ONE TABLET BY MOUTH daily) 60 tablet 4   • docusate sodium (COLACE) 100 MG capsule Take 1 capsule by mouth 2 (Two) Times a Day As Needed for Constipation. 60 capsule 1   • escitalopram (LEXAPRO) 20 MG tablet TAKE ONE TABLET BY MOUTH DAILY 30 tablet 2   • omeprazole (priLOSEC) 20 MG capsule Take 20 mg by mouth Every Other Day.     • polyethylene glycol (MIRALAX) powder DISSOLVE 17 GRAMS IN 8 OUNCES OF LIQUID AND DRINK ONCE A  g 0    • propranolol LA (INDERAL LA) 60 MG 24 hr capsule TAKE ONE CAPSULE BY MOUTH DAILY 30 capsule 2   • [DISCONTINUED] meclizine (ANTIVERT) 25 MG tablet Take 25 mg by mouth as needed for dizziness.     • HYDROcodone-acetaminophen (NORCO) 5-325 MG per tablet Take 1-2 tablets by mouth Every 4 (Four) Hours As Needed (Pain). 40 tablet 0     No current facility-administered medications on file prior to visit.       No Known Allergies     Review of Systems   Constitutional: Negative.    HENT: Negative.    Eyes: Negative.    Respiratory: Negative.    Cardiovascular: Negative.    Gastrointestinal: Negative.    Endocrine: Negative.    Genitourinary: Negative.    Musculoskeletal: Positive for arthralgias and myalgias.   Skin: Negative.    Allergic/Immunologic: Negative.    Neurological: Positive for tremors.   Hematological: Negative.    Psychiatric/Behavioral: Negative.         Physical Exam  Last menstrual period 04/07/2018, not currently breastfeeding.    There is no height or weight on file to calculate BMI.    GENERAL APPEARANCE: awake, alert, oriented, in no acute distress  LUNGS:  breathing nonlabored  EXTREMITIES: no clubbing, cyanosis  PERIPHERAL PULSES: palpable dorsalis pedis and posterior tibial pulses bilaterally.    GAIT:  Normal          Right Knee Exam:  ----------  ALIGNMENT: neutral  ----------  RANGE OF MOTION:  Normal (0-120 degrees) with no extensor lag or flexion contracture  LIGAMENTOUS STABILITY:   stable to varus and valgus stress at terminal extension and 30 degrees without any evidence of laxity  ----------  STRENGTH:  KNEE FLEXION 5/5  KNEE EXTENSION  5/5  ANKLE DORSIFLEXION  5/5  ANKLE PLANTARFLEXION  5/5  ----------  PAIN WITH PALPATION:medial joint line  KNEE EFFUSION: no  PAIN WITH KNEE ROM: no  PATELLAR CREPITUS:  no  ----------  SENSATION TO LIGHT TOUCH:  DEEP PERONEAL/SUPERFICIAL PERONEAL/SURAL/SAPHENOUS/TIBIAL:    intact  ----------  EDEMA:  no  ERYTHEMA:    no  WOUNDS/INCISIONS:  yes,  well-healed arthroscopic portals  _____________________________________________________________________  _____________________________________________________________________    RADIOGRAPHIC FINDINGS:   No new imaging today      Assessment/Plan:   Diagnosis Plan   1. Osteochondritis dissecans of right knee     2. Old complex tear of medial meniscus of right knee       At this point, we will initiate weightbearing as tolerated with slow resumption of all normal daily activities.  She is able to return to work.  I will see her back in 2 months for repeat assessment.    Steve Mauro MD  01/24/19  9:08 AM

## 2019-01-25 ENCOUNTER — TELEPHONE (OUTPATIENT)
Dept: ORTHOPEDIC SURGERY | Facility: CLINIC | Age: 49
End: 2019-01-25

## 2019-01-25 RX ORDER — MELOXICAM 15 MG/1
TABLET ORAL
Qty: 60 TABLET | Refills: 0 | Status: SHIPPED | OUTPATIENT
Start: 2019-01-25 | End: 2019-03-26 | Stop reason: SDUPTHER

## 2019-01-25 NOTE — TELEPHONE ENCOUNTER
Dr. Mauro,  Patient has sent a message via my chart asking about Meloxicam. She says that you discussed it at her last visit, however her pharmacy doesn't have an order.  Do you mind to send an RX if you would like for her to start it?

## 2019-02-14 RX ORDER — POLYETHYLENE GLYCOL 3350 17 G/17G
POWDER, FOR SOLUTION ORAL
Qty: 238 G | Refills: 1 | Status: SHIPPED | OUTPATIENT
Start: 2019-02-14 | End: 2019-03-30 | Stop reason: SDUPTHER

## 2019-02-25 DIAGNOSIS — R25.1 TREMOR: ICD-10-CM

## 2019-03-05 RX ORDER — ESCITALOPRAM OXALATE 20 MG/1
TABLET ORAL
Qty: 30 TABLET | Refills: 5 | Status: SHIPPED | OUTPATIENT
Start: 2019-03-05 | End: 2019-08-27 | Stop reason: SDUPTHER

## 2019-03-05 RX ORDER — PROPRANOLOL HCL 60 MG
CAPSULE, EXTENDED RELEASE 24HR ORAL
Qty: 30 CAPSULE | Refills: 5 | Status: SHIPPED | OUTPATIENT
Start: 2019-03-05 | End: 2019-08-27 | Stop reason: SDUPTHER

## 2019-03-26 ENCOUNTER — OFFICE VISIT (OUTPATIENT)
Dept: ORTHOPEDIC SURGERY | Facility: CLINIC | Age: 49
End: 2019-03-26

## 2019-03-26 VITALS — WEIGHT: 189.82 LBS | OXYGEN SATURATION: 98 % | BODY MASS INDEX: 30.51 KG/M2 | HEART RATE: 84 BPM | HEIGHT: 66 IN

## 2019-03-26 DIAGNOSIS — M93.261 OSTEOCHONDRITIS DISSECANS OF RIGHT KNEE: Primary | ICD-10-CM

## 2019-03-26 DIAGNOSIS — M70.50 PES ANSERINE BURSITIS: ICD-10-CM

## 2019-03-26 PROCEDURE — 99212 OFFICE O/P EST SF 10 MIN: CPT | Performed by: ORTHOPAEDIC SURGERY

## 2019-03-26 RX ORDER — MELOXICAM 15 MG/1
TABLET ORAL
Qty: 60 TABLET | Refills: 0 | Status: SHIPPED | OUTPATIENT
Start: 2019-03-26 | End: 2019-06-04 | Stop reason: SDUPTHER

## 2019-03-26 NOTE — PROGRESS NOTES
Orthopaedic Clinic Note: Knee Established Patient    Chief Complaint   Patient presents with   • Right Knee - Follow-up     Right Knee Arthroscopy 2018; Osteochondritis Dissecans of Right Knee  2 month f/u         HPI    It has been 2  month(s) since Ms. Naik's last visit. She returns to clinic today for follow-up right knee arthroscopy for osteochondritis dissecans lesion.  She rates her pain 1/10 on the pain scale.  She is ambulate with no assistive device.  She denies fevers chills or constitutional symptoms.  Overall she states that her knee is doing much better and she is actually able to resume most activities without any significant limitation.    Past Medical History:   Diagnosis Date   • Acute maxillary sinusitis    • Anxiety and depression    • Arthralgia    • Arthritis     knee and foot   • Colicky RUQ abdominal pain    • Constipation    • Depression    • Dizziness and giddiness    • Essential tremor     propranolol for this   • GERD (gastroesophageal reflux disease)    • High cholesterol    • Lumbago    • Metatarsalgia, right foot    • PONV (postoperative nausea and vomiting)    • Pustule    • Spinal headache     with csection   • Tremor    • Wears reading eyeglasses       Past Surgical History:   Procedure Laterality Date   • ADENOIDECTOMY     •  SECTION     • CHOLECYSTECTOMY     • COLONOSCOPY     • ENDOMETRIAL ABLATION     • KNEE ARTHROSCOPY Right 2018    Procedure: KNEE ARTHROSCOPY RIGHT;  Surgeon: Steve Mauro MD;  Location: [facility] OR;  Service: Orthopedics   • TONSILLECTOMY     • TOTAL LAPAROSCOPIC HYSTERECTOMY N/A 2018    Procedure: TOTAL LAPAROSCOPIC HYSTERECTOMY WITH DAVINCI WITH BILATERAL SALPINGO OOPHERECTOMY, SKIN TAG REMOVAL FROM INNER LABIA MINORA, CYSTO;  Surgeon: Renetta Artis MD;  Location: [facility] OR;  Service: Obstetrics/Gynecology      Family History   Problem Relation Age of Onset   • Stroke Mother    • Heart disease Mother    • Arthritis  Mother    • Cancer Other    • Heart disease Other    • Depression Other    • Heart attack Other    • Depression Child    • Anesthesia problems Other    • Rheum arthritis Other    • Hypertension Other    • Hyperlipidemia Daughter      Social History     Socioeconomic History   • Marital status:      Spouse name: Not on file   • Number of children: Not on file   • Years of education: Not on file   • Highest education level: Not on file   Tobacco Use   • Smoking status: Never Smoker   • Smokeless tobacco: Never Used   Substance and Sexual Activity   • Alcohol use: Yes     Types: 1 Glasses of wine, 1 Cans of beer per week     Comment: Occ   • Drug use: No   • Sexual activity: Yes     Partners: Male     Birth control/protection: Surgical      Current Outpatient Medications on File Prior to Visit   Medication Sig Dispense Refill   • ALPRAZolam (XANAX) 1 MG tablet      • atorvastatin (LIPITOR) 10 MG tablet TAKE ONE TABLET BY MOUTH DAILY 90 tablet 2   • buPROPion SR (WELLBUTRIN SR) 150 MG 12 hr tablet TAKE ONE TABLET BY MOUTH TWICE A DAY (Patient taking differently: TAKE ONE TABLET BY MOUTH daily) 60 tablet 4   • docusate sodium (COLACE) 100 MG capsule Take 1 capsule by mouth 2 (Two) Times a Day As Needed for Constipation. 60 capsule 1   • escitalopram (LEXAPRO) 20 MG tablet TAKE ONE TABLET BY MOUTH DAILY 30 tablet 5   • omeprazole (priLOSEC) 20 MG capsule Take 20 mg by mouth Every Other Day.     • polyethylene glycol (MIRALAX) powder DISSOLVE 17 GRAMS IN 8 OUNCES OF LIQUID AND DRINK ONCE A  g 1   • propranolol LA (INDERAL LA) 60 MG 24 hr capsule TAKE ONE CAPSULE BY MOUTH DAILY 30 capsule 5   • [DISCONTINUED] meloxicam (MOBIC) 15 MG tablet 1 PO Daily with food. 60 tablet 0   • [DISCONTINUED] ALPRAZolam (XANAX) 0.5 MG tablet Take one tablet at bedtime (Patient taking differently: Take 0.5 mg by mouth every night at bedtime. Take one tablet at bedtime) 30 tablet 2   • [DISCONTINUED] HYDROcodone-acetaminophen  "(NORCO) 5-325 MG per tablet Take 1-2 tablets by mouth Every 4 (Four) Hours As Needed (Pain). 40 tablet 0     No current facility-administered medications on file prior to visit.       No Known Allergies     Review of Systems   Constitutional: Negative.    HENT: Negative.    Eyes: Negative.    Respiratory: Negative.    Cardiovascular: Negative.    Gastrointestinal: Negative.    Endocrine: Negative.    Genitourinary: Negative.    Musculoskeletal: Positive for arthralgias.   Skin: Negative.    Allergic/Immunologic: Negative.    Neurological: Positive for tremors.   Hematological: Negative.    Psychiatric/Behavioral: Negative.         Physical Exam  Pulse 84, height 167.6 cm (65.98\"), weight 86.1 kg (189 lb 13.1 oz), last menstrual period 04/07/2018, SpO2 98 %, not currently breastfeeding.    Body mass index is 30.65 kg/m².    GENERAL APPEARANCE: awake, alert, oriented, in no acute distress and well developed, well nourished  LUNGS:  breathing nonlabored  EXTREMITIES: no clubbing, cyanosis  PERIPHERAL PULSES: palpable dorsalis pedis and posterior tibial pulses bilaterally.    GAIT:  Normal        ----------  Right Knee Exam:  ----------  ALIGNMENT: neutral  ----------  RANGE OF MOTION:  Normal (0-120 degrees) with no extensor lag or flexion contracture  LIGAMENTOUS STABILITY:   stable to varus and valgus stress at terminal extension and 30 degrees without any evidence of laxity  ----------  STRENGTH:  KNEE FLEXION 5/5  KNEE EXTENSION  5/5  ANKLE DORSIFLEXION  5/5  ANKLE PLANTARFLEXION  5/5  ----------  PAIN WITH PALPATION: Slight tenderness of pes bursa  KNEE EFFUSION: no  PAIN WITH KNEE ROM: no  PATELLAR CREPITUS:  no  ----------  SENSATION TO LIGHT TOUCH:  DEEP PERONEAL/SUPERFICIAL PERONEAL/SURAL/SAPHENOUS/TIBIAL:    intact  ----------  EDEMA:  no  ERYTHEMA:    no  WOUNDS/INCISIONS:  yes, well-healed arthroscopic " portals  _____________________________________________________________________  _____________________________________________________________________    RADIOGRAPHIC FINDINGS:   No imaging today    Assessment/Plan:   Diagnosis Plan   1. Osteochondritis dissecans of right knee  meloxicam (MOBIC) 15 MG tablet   2. Pes anserine bursitis  meloxicam (MOBIC) 15 MG tablet     Patient is doing much better status post arthroscopic treatment of meniscal tear as well as microfracture surgery.  I recommended continued activity as tolerated.  I will refill her Mobic.  She will follow-up as needed.      Steve Mauro MD  03/26/19  10:34 AM

## 2019-04-01 RX ORDER — POLYETHYLENE GLYCOL 3350 17 G/17G
POWDER, FOR SOLUTION ORAL
Qty: 238 G | Refills: 1 | Status: SHIPPED | OUTPATIENT
Start: 2019-04-01 | End: 2019-10-16

## 2019-04-03 NOTE — ANESTHESIA POSTPROCEDURE EVALUATION
Patient: Eugenia Naik    Procedure Summary     Date:  04/17/18 Room / Location:   [facility] 01 / [facility]    Anesthesia Start:  1156 Anesthesia Stop:      Procedure:  TOTAL LAPAROSCOPIC HYSTERECTOMY WITH DAVINCI WITH BILATERAL SALPINGO OOPHERECTOMY, SKIN TAG REMOVAL FROM INNER LABIA MINORA, CYSTO (N/A Abdomen) Diagnosis:      Surgeon:  Renetta Artis MD Provider:  Man Mendzoa MD    Anesthesia Type:  general ASA Status:  2          Anesthesia Type: general  Last vitals  BP   123/76   Temp   97.4   Pulse   67   Resp   16   SpO2   98     Post Anesthesia Care and Evaluation    Patient location during evaluation: PACU  Patient participation: complete - patient participated  Level of consciousness: sleepy but conscious  Pain score: 0  Pain management: adequate  Airway patency: patent  Anesthetic complications: No anesthetic complications  PONV Status: none  Cardiovascular status: hemodynamically stable and acceptable  Respiratory status: nonlabored ventilation, acceptable and nasal cannula  Hydration status: acceptable       3

## 2019-05-17 ENCOUNTER — TRANSCRIBE ORDERS (OUTPATIENT)
Dept: LAB | Facility: HOSPITAL | Age: 49
End: 2019-05-17

## 2019-05-17 ENCOUNTER — LAB (OUTPATIENT)
Dept: LAB | Facility: HOSPITAL | Age: 49
End: 2019-05-17

## 2019-05-17 DIAGNOSIS — Z01.419 PAP SMEAR, LOW-RISK: ICD-10-CM

## 2019-05-17 DIAGNOSIS — Z01.419 PAP SMEAR, LOW-RISK: Primary | ICD-10-CM

## 2019-05-17 LAB
25(OH)D3 SERPL-MCNC: 48.5 NG/ML (ref 30–100)
T3 SERPL-MCNC: 78.7 NG/DL (ref 80–200)
T4 FREE SERPL-MCNC: 1.4 NG/DL (ref 0.93–1.7)
TSH SERPL DL<=0.05 MIU/L-ACNC: 3.12 MIU/ML (ref 0.27–4.2)

## 2019-05-17 PROCEDURE — 84443 ASSAY THYROID STIM HORMONE: CPT

## 2019-05-17 PROCEDURE — 84480 ASSAY TRIIODOTHYRONINE (T3): CPT

## 2019-05-17 PROCEDURE — 36415 COLL VENOUS BLD VENIPUNCTURE: CPT

## 2019-05-17 PROCEDURE — 82306 VITAMIN D 25 HYDROXY: CPT

## 2019-05-17 PROCEDURE — 84439 ASSAY OF FREE THYROXINE: CPT

## 2019-05-29 ENCOUNTER — TELEPHONE (OUTPATIENT)
Dept: FAMILY MEDICINE CLINIC | Facility: CLINIC | Age: 49
End: 2019-05-29

## 2019-05-29 ENCOUNTER — PATIENT MESSAGE (OUTPATIENT)
Dept: FAMILY MEDICINE CLINIC | Facility: CLINIC | Age: 49
End: 2019-05-29

## 2019-05-29 NOTE — TELEPHONE ENCOUNTER
Regarding: Test Results Question  Contact: [phone]  ----- Message from Mychart, Generic sent at 5/29/2019 10:30 AM EDT -----    My gyn had me get tested for t3, t4, tsh and free t4.   My t3 was abnormal. She wants me to follow up with you all.  I was wondering if we should draw a free t3 and reverse t3 and any other labs you suggest  before the appointment?  I have off tuesdays and Wednesday’s. For a follow up. Thanks Eugenia

## 2019-05-29 NOTE — TELEPHONE ENCOUNTER
I called [facility] pharmacy and they stated that they did dispense the 30 mg tablets of the cardizem to the patient.  I called the patient and she apologized and said that she was looking at the wrong medication when she told you 60 mgs.  She said that she is taking 30 mg tabs 2 times a day.     Too early to do blood work now she can make an appointment anytime she like you can come and discuss treatment options

## 2019-06-04 DIAGNOSIS — M70.50 PES ANSERINE BURSITIS: ICD-10-CM

## 2019-06-04 DIAGNOSIS — M93.261 OSTEOCHONDRITIS DISSECANS OF RIGHT KNEE: ICD-10-CM

## 2019-06-04 RX ORDER — MELOXICAM 15 MG/1
TABLET ORAL
Qty: 60 TABLET | Refills: 0 | Status: SHIPPED | OUTPATIENT
Start: 2019-06-04 | End: 2019-10-05 | Stop reason: SDUPTHER

## 2019-08-05 RX ORDER — ALPRAZOLAM 1 MG/1
TABLET ORAL
Qty: 60 TABLET | Refills: 1 | OUTPATIENT
Start: 2019-08-05

## 2019-08-27 DIAGNOSIS — R25.1 TREMOR: ICD-10-CM

## 2019-08-29 RX ORDER — PROPRANOLOL HCL 60 MG
CAPSULE, EXTENDED RELEASE 24HR ORAL
Qty: 30 CAPSULE | Refills: 0 | Status: SHIPPED | OUTPATIENT
Start: 2019-08-29 | End: 2019-10-16 | Stop reason: SDUPTHER

## 2019-08-29 RX ORDER — ESCITALOPRAM OXALATE 20 MG/1
TABLET ORAL
Qty: 30 TABLET | Refills: 0 | Status: SHIPPED | OUTPATIENT
Start: 2019-08-29 | End: 2019-08-31 | Stop reason: SDUPTHER

## 2019-08-29 RX ORDER — BUPROPION HYDROCHLORIDE 150 MG/1
TABLET, EXTENDED RELEASE ORAL
Qty: 60 TABLET | Refills: 0 | Status: SHIPPED | OUTPATIENT
Start: 2019-08-29 | End: 2019-10-01 | Stop reason: SDUPTHER

## 2019-08-31 DIAGNOSIS — R25.1 TREMOR: ICD-10-CM

## 2019-09-04 RX ORDER — PROPRANOLOL HCL 60 MG
CAPSULE, EXTENDED RELEASE 24HR ORAL
Qty: 30 CAPSULE | Refills: 0 | Status: SHIPPED | OUTPATIENT
Start: 2019-09-04 | End: 2019-10-19 | Stop reason: SDUPTHER

## 2019-09-04 RX ORDER — ESCITALOPRAM OXALATE 20 MG/1
TABLET ORAL
Qty: 30 TABLET | Refills: 0 | Status: SHIPPED | OUTPATIENT
Start: 2019-09-04 | End: 2019-10-28 | Stop reason: SDUPTHER

## 2019-09-25 DIAGNOSIS — R25.1 TREMOR: ICD-10-CM

## 2019-09-25 RX ORDER — PROPRANOLOL HCL 60 MG
CAPSULE, EXTENDED RELEASE 24HR ORAL
Qty: 30 CAPSULE | Refills: 0 | OUTPATIENT
Start: 2019-09-25

## 2019-09-26 DIAGNOSIS — Z00.00 ANNUAL PHYSICAL EXAM: ICD-10-CM

## 2019-09-26 RX ORDER — ALPRAZOLAM 1 MG/1
1 TABLET ORAL 2 TIMES DAILY
Qty: 60 TABLET | Refills: 0 | OUTPATIENT
Start: 2019-09-26 | End: 2019-11-20 | Stop reason: SDUPTHER

## 2019-09-26 RX ORDER — ATORVASTATIN CALCIUM 10 MG/1
TABLET, FILM COATED ORAL
Qty: 90 TABLET | Refills: 1 | Status: SHIPPED | OUTPATIENT
Start: 2019-09-26 | End: 2020-03-26

## 2019-10-05 DIAGNOSIS — M93.261 OSTEOCHONDRITIS DISSECANS OF RIGHT KNEE: ICD-10-CM

## 2019-10-05 DIAGNOSIS — M70.50 PES ANSERINE BURSITIS: ICD-10-CM

## 2019-10-07 RX ORDER — MELOXICAM 15 MG/1
TABLET ORAL
Qty: 60 TABLET | Refills: 0 | Status: SHIPPED | OUTPATIENT
Start: 2019-10-07 | End: 2019-12-08 | Stop reason: SDUPTHER

## 2019-10-07 RX ORDER — BUPROPION HYDROCHLORIDE 150 MG/1
150 TABLET, EXTENDED RELEASE ORAL 2 TIMES DAILY
Qty: 60 TABLET | Refills: 0 | OUTPATIENT
Start: 2019-10-07

## 2019-10-07 RX ORDER — BUPROPION HYDROCHLORIDE 150 MG/1
TABLET, EXTENDED RELEASE ORAL
Qty: 60 TABLET | Refills: 0 | Status: SHIPPED | OUTPATIENT
Start: 2019-10-07 | End: 2019-11-06 | Stop reason: SDUPTHER

## 2019-10-07 RX ORDER — ALPRAZOLAM 1 MG/1
TABLET ORAL
Qty: 60 TABLET | Refills: 1 | OUTPATIENT
Start: 2019-10-07

## 2019-10-16 ENCOUNTER — OFFICE VISIT (OUTPATIENT)
Dept: FAMILY MEDICINE CLINIC | Facility: CLINIC | Age: 49
End: 2019-10-16

## 2019-10-16 VITALS
RESPIRATION RATE: 16 BRPM | BODY MASS INDEX: 29.83 KG/M2 | WEIGHT: 185.6 LBS | HEIGHT: 66 IN | SYSTOLIC BLOOD PRESSURE: 128 MMHG | DIASTOLIC BLOOD PRESSURE: 84 MMHG | TEMPERATURE: 98.6 F | OXYGEN SATURATION: 99 % | HEART RATE: 72 BPM

## 2019-10-16 DIAGNOSIS — Z00.00 ROUTINE GENERAL MEDICAL EXAMINATION AT A HEALTH CARE FACILITY: Primary | ICD-10-CM

## 2019-10-16 LAB
BILIRUB BLD-MCNC: NEGATIVE MG/DL
CLARITY, POC: CLEAR
COLOR UR: YELLOW
GLUCOSE UR STRIP-MCNC: NEGATIVE MG/DL
KETONES UR QL: NEGATIVE
LEUKOCYTE EST, POC: NEGATIVE
NITRITE UR-MCNC: NEGATIVE MG/ML
PH UR: 6.5 [PH] (ref 5–8)
PROT UR STRIP-MCNC: NEGATIVE MG/DL
RBC # UR STRIP: NEGATIVE /UL
SP GR UR: 1.01 (ref 1–1.03)
UROBILINOGEN UR QL: NORMAL

## 2019-10-16 PROCEDURE — 99396 PREV VISIT EST AGE 40-64: CPT | Performed by: PHYSICIAN ASSISTANT

## 2019-10-16 PROCEDURE — 81003 URINALYSIS AUTO W/O SCOPE: CPT | Performed by: PHYSICIAN ASSISTANT

## 2019-10-16 PROCEDURE — 93000 ELECTROCARDIOGRAM COMPLETE: CPT | Performed by: PHYSICIAN ASSISTANT

## 2019-10-17 ENCOUNTER — LAB (OUTPATIENT)
Dept: LAB | Facility: HOSPITAL | Age: 49
End: 2019-10-17

## 2019-10-17 DIAGNOSIS — Z00.00 ROUTINE GENERAL MEDICAL EXAMINATION AT A HEALTH CARE FACILITY: ICD-10-CM

## 2019-10-17 LAB
25(OH)D3 SERPL-MCNC: 50.1 NG/ML (ref 30–100)
ALBUMIN SERPL-MCNC: 5 G/DL (ref 3.5–5.2)
ALBUMIN/GLOB SERPL: 2.1 G/DL
ALP SERPL-CCNC: 98 U/L (ref 39–117)
ALT SERPL W P-5'-P-CCNC: 20 U/L (ref 1–33)
ANION GAP SERPL CALCULATED.3IONS-SCNC: 13.2 MMOL/L (ref 5–15)
AST SERPL-CCNC: 18 U/L (ref 1–32)
BASOPHILS # BLD AUTO: 0.05 10*3/MM3 (ref 0–0.2)
BASOPHILS NFR BLD AUTO: 0.8 % (ref 0–1.5)
BILIRUB SERPL-MCNC: 0.4 MG/DL (ref 0.2–1.2)
BUN BLD-MCNC: 17 MG/DL (ref 6–20)
BUN/CREAT SERPL: 13.3 (ref 7–25)
CALCIUM SPEC-SCNC: 9.9 MG/DL (ref 8.6–10.5)
CHLORIDE SERPL-SCNC: 101 MMOL/L (ref 98–107)
CHOLEST SERPL-MCNC: 218 MG/DL (ref 0–200)
CO2 SERPL-SCNC: 24.8 MMOL/L (ref 22–29)
CREAT BLD-MCNC: 1.28 MG/DL (ref 0.57–1)
DEPRECATED RDW RBC AUTO: 40.8 FL (ref 37–54)
EOSINOPHIL # BLD AUTO: 0.14 10*3/MM3 (ref 0–0.4)
EOSINOPHIL NFR BLD AUTO: 2.3 % (ref 0.3–6.2)
ERYTHROCYTE [DISTWIDTH] IN BLOOD BY AUTOMATED COUNT: 12.4 % (ref 12.3–15.4)
GFR SERPL CREATININE-BSD FRML MDRD: 44 ML/MIN/1.73
GLOBULIN UR ELPH-MCNC: 2.4 GM/DL
GLUCOSE BLD-MCNC: 107 MG/DL (ref 65–99)
HCT VFR BLD AUTO: 45.4 % (ref 34–46.6)
HDLC SERPL-MCNC: 62 MG/DL (ref 40–60)
HGB BLD-MCNC: 14.9 G/DL (ref 12–15.9)
IMM GRANULOCYTES # BLD AUTO: 0.01 10*3/MM3 (ref 0–0.05)
IMM GRANULOCYTES NFR BLD AUTO: 0.2 % (ref 0–0.5)
LDLC SERPL CALC-MCNC: 111 MG/DL (ref 0–100)
LDLC/HDLC SERPL: 1.78 {RATIO}
LYMPHOCYTES # BLD AUTO: 1.15 10*3/MM3 (ref 0.7–3.1)
LYMPHOCYTES NFR BLD AUTO: 18.5 % (ref 19.6–45.3)
MCH RBC QN AUTO: 29 PG (ref 26.6–33)
MCHC RBC AUTO-ENTMCNC: 32.8 G/DL (ref 31.5–35.7)
MCV RBC AUTO: 88.3 FL (ref 79–97)
MONOCYTES # BLD AUTO: 0.47 10*3/MM3 (ref 0.1–0.9)
MONOCYTES NFR BLD AUTO: 7.6 % (ref 5–12)
NEUTROPHILS # BLD AUTO: 4.39 10*3/MM3 (ref 1.7–7)
NEUTROPHILS NFR BLD AUTO: 70.6 % (ref 42.7–76)
NRBC BLD AUTO-RTO: 0 /100 WBC (ref 0–0.2)
PLATELET # BLD AUTO: 286 10*3/MM3 (ref 140–450)
PMV BLD AUTO: 10 FL (ref 6–12)
POTASSIUM BLD-SCNC: 4.8 MMOL/L (ref 3.5–5.2)
PROT SERPL-MCNC: 7.4 G/DL (ref 6–8.5)
RBC # BLD AUTO: 5.14 10*6/MM3 (ref 3.77–5.28)
SODIUM BLD-SCNC: 139 MMOL/L (ref 136–145)
TRIGL SERPL-MCNC: 227 MG/DL (ref 0–150)
TSH SERPL DL<=0.05 MIU/L-ACNC: 1.81 UIU/ML (ref 0.27–4.2)
VLDLC SERPL-MCNC: 45.4 MG/DL
WBC NRBC COR # BLD: 6.21 10*3/MM3 (ref 3.4–10.8)

## 2019-10-17 PROCEDURE — 36415 COLL VENOUS BLD VENIPUNCTURE: CPT

## 2019-10-17 PROCEDURE — 84443 ASSAY THYROID STIM HORMONE: CPT

## 2019-10-17 PROCEDURE — 85025 COMPLETE CBC W/AUTO DIFF WBC: CPT

## 2019-10-17 PROCEDURE — 80061 LIPID PANEL: CPT

## 2019-10-17 PROCEDURE — 80053 COMPREHEN METABOLIC PANEL: CPT

## 2019-10-17 PROCEDURE — 82306 VITAMIN D 25 HYDROXY: CPT

## 2019-10-17 NOTE — PROGRESS NOTES
Subjective   Eugenia Naik is a 49 y.o. female  Annual Exam (Pt is fasting. UTD on MMG, Pap Smear, colonoscopy. Already had flu shot, req Shingrix. )      History of Present Illness  Patient is a pleasant 49-year-old white female who comes in for preventive medical examination denies any new problems or complaints  The following portions of the patient's history were reviewed and updated as appropriate: allergies, current medications, past social history and problem list    Review of Systems   Constitutional: Negative.    HENT: Negative.    Eyes: Negative.    Respiratory: Negative.    Cardiovascular: Negative.    Gastrointestinal: Negative.    Endocrine: Negative.    Genitourinary: Negative.    Musculoskeletal: Negative.    Skin: Negative.    Allergic/Immunologic: Negative.    Neurological: Negative.    Hematological: Negative.    Psychiatric/Behavioral: Negative.    All other systems reviewed and are negative.      Objective     Vitals:    10/16/19 1343   BP: 128/84   Pulse: 72   Resp: 16   Temp: 98.6 °F (37 °C)   SpO2: 99%       Physical Exam   Constitutional: She is oriented to person, place, and time. She appears well-developed and well-nourished.   HENT:   Head: Normocephalic and atraumatic.   Right Ear: External ear normal.   Left Ear: External ear normal.   Nose: Nose normal.   Mouth/Throat: Oropharynx is clear and moist.   Eyes: Conjunctivae and EOM are normal. Pupils are equal, round, and reactive to light.   Neck: Normal range of motion. Neck supple. No JVD present. Carotid bruit is not present. No thyromegaly present.   Cardiovascular: Normal rate, regular rhythm, normal heart sounds and intact distal pulses.   No murmur heard.  Pulmonary/Chest: Effort normal and breath sounds normal.   Abdominal: Soft. Bowel sounds are normal. She exhibits no mass. There is no tenderness.   Musculoskeletal: Normal range of motion. She exhibits no edema.   Lymphadenopathy:     She has no cervical adenopathy.    Neurological: She is alert and oriented to person, place, and time. She has normal reflexes. No cranial nerve deficit.   Skin: Skin is warm and dry.   Psychiatric: She has a normal mood and affect.   Nursing note and vitals reviewed.    ECG 12 Lead  Date/Time: 10/17/2019 4:50 PM  Performed by: Jeff Montesinos PA  Authorized by: Jeff Montesinos PA   Comparison: not compared with previous ECG   Rhythm: sinus rhythm  Rate: normal  Conduction: conduction normal  ST Segments: ST segments normal  T Waves: T waves normal  QRS axis: normal  Other findings: non-specific ST-T wave changes    Clinical impression: normal ECG            Assessment/Plan     Diagnoses and all orders for this visit:    Routine general medical examination at a health care facility  -     POCT urinalysis dipstick, automated  -     Comprehensive Metabolic Panel; Future  -     CBC & Differential; Future  -     Lipid Panel; Future  -     TSH; Future  -     Vitamin D 25 hydroxy; Future    Preventive medicine discussed, diet, exercise, healthy living discussed importance of losing weight exercise, Shingrix

## 2019-10-18 ENCOUNTER — OFFICE VISIT (OUTPATIENT)
Dept: ORTHOPEDIC SURGERY | Facility: CLINIC | Age: 49
End: 2019-10-18

## 2019-10-18 VITALS — WEIGHT: 184 LBS | OXYGEN SATURATION: 98 % | HEIGHT: 66 IN | BODY MASS INDEX: 29.57 KG/M2 | HEART RATE: 90 BPM

## 2019-10-18 DIAGNOSIS — M93.261 OSTEOCHONDRITIS DISSECANS OF RIGHT KNEE: Primary | ICD-10-CM

## 2019-10-18 DIAGNOSIS — M70.50 PES ANSERINE BURSITIS: ICD-10-CM

## 2019-10-18 PROCEDURE — 99213 OFFICE O/P EST LOW 20 MIN: CPT | Performed by: ORTHOPAEDIC SURGERY

## 2019-10-18 PROCEDURE — 20610 DRAIN/INJ JOINT/BURSA W/O US: CPT | Performed by: ORTHOPAEDIC SURGERY

## 2019-10-18 RX ORDER — LIDOCAINE HYDROCHLORIDE 10 MG/ML
2 INJECTION, SOLUTION EPIDURAL; INFILTRATION; INTRACAUDAL; PERINEURAL
Status: COMPLETED | OUTPATIENT
Start: 2019-10-18 | End: 2019-10-18

## 2019-10-18 RX ORDER — TRIAMCINOLONE ACETONIDE 40 MG/ML
40 INJECTION, SUSPENSION INTRA-ARTICULAR; INTRAMUSCULAR
Status: COMPLETED | OUTPATIENT
Start: 2019-10-18 | End: 2019-10-18

## 2019-10-18 RX ADMIN — LIDOCAINE HYDROCHLORIDE 2 ML: 10 INJECTION, SOLUTION EPIDURAL; INFILTRATION; INTRACAUDAL; PERINEURAL at 14:31

## 2019-10-18 RX ADMIN — TRIAMCINOLONE ACETONIDE 40 MG: 40 INJECTION, SUSPENSION INTRA-ARTICULAR; INTRAMUSCULAR at 14:31

## 2019-10-18 NOTE — PROGRESS NOTES
Procedure   Large Joint Arthrocentesis: R knee  Date/Time: 10/18/2019 2:31 PM  Consent given by: patient  Site marked: site marked  Timeout: Immediately prior to procedure a time out was called to verify the correct patient, procedure, equipment, support staff and site/side marked as required   Supporting Documentation  Indications: pain   Procedure Details  Location: knee - R knee  Preparation: Patient was prepped and draped in the usual sterile fashion  Needle size: 22 G  Approach: anteromedial  Medications administered: 40 mg triamcinolone acetonide 40 MG/ML; 2 mL lidocaine PF 1% 1 % (3cc bupivacaine .25% ndc#[id number] lot#[id number] exp:[id number])  Patient tolerance: patient tolerated the procedure well with no immediate complications

## 2019-10-18 NOTE — PROGRESS NOTES
Orthopaedic Clinic Note: Knee Established Patient    Chief Complaint   Patient presents with   • Right Knee - Pain     7 month follow up        HPI    It has been 7  month(s) since Ms. Naik's last visit. She returns to clinic today for right knee pain.  Patient has been doing well after arthroscopy and microfracture for osteochondral lesion.  She states that a week ago, she was going around the house and twisted and felt a crack in her knee followed by pain.  She is localizing her pain to the medial proximal tibia.  Rates her pain 4/10 on the pain scale.  She states she is having swelling in popping of the knee.  Her pain is worse with walking and bending the knee.  She has been taking Mobic.  She is ambulating with no assistive device today.  She is here today to discuss treatment options for her onset of 1 week of right knee pain that is causing limitations daily activities.    Past Medical History:   Diagnosis Date   • Acute maxillary sinusitis    • Anxiety and depression    • Arthralgia    • Arthritis     knee and foot   • Colicky RUQ abdominal pain    • Constipation    • Depression    • Dizziness and giddiness    • Essential tremor     propranolol for this   • GERD (gastroesophageal reflux disease)    • High cholesterol    • Lumbago    • Metatarsalgia, right foot    • PONV (postoperative nausea and vomiting)    • Pustule    • Spinal headache     with csection   • Tremor    • Wears reading eyeglasses       Past Surgical History:   Procedure Laterality Date   • ADENOIDECTOMY     •  SECTION     • CHOLECYSTECTOMY     • COLONOSCOPY     • ENDOMETRIAL ABLATION     • KNEE ARTHROSCOPY Right 2018    Procedure: KNEE ARTHROSCOPY RIGHT;  Surgeon: Steve Mauro MD;  Location: [facility];  Service: Orthopedics   • TONSILLECTOMY     • TOTAL LAPAROSCOPIC HYSTERECTOMY N/A 2018    Procedure: TOTAL LAPAROSCOPIC HYSTERECTOMY WITH DAVINCI WITH BILATERAL SALPINGO OOPHERECTOMY, SKIN TAG REMOVAL FROM  INNER LABIA MINORA, CYSTO;  Surgeon: Renetta Artis MD;  Location: [facility];  Service: Obstetrics/Gynecology      Family History   Problem Relation Age of Onset   • Stroke Mother    • Heart disease Mother    • Arthritis Mother    • Cancer Other    • Heart disease Other    • Depression Other    • Heart attack Other    • Depression Child    • Anesthesia problems Other    • Rheum arthritis Other    • Hypertension Other    • Hyperlipidemia Daughter      Social History     Socioeconomic History   • Marital status:      Spouse name: Not on file   • Number of children: Not on file   • Years of education: Not on file   • Highest education level: Not on file   Tobacco Use   • Smoking status: Never Smoker   • Smokeless tobacco: Never Used   Substance and Sexual Activity   • Alcohol use: Yes     Types: 1 Glasses of wine, 1 Cans of beer per week     Comment: Occ   • Drug use: No   • Sexual activity: Yes     Partners: Male     Birth control/protection: Surgical      Current Outpatient Medications on File Prior to Visit   Medication Sig Dispense Refill   • ALPRAZolam (XANAX) 1 MG tablet Take 1 tablet by mouth 2 (Two) Times a Day. 60 tablet 0   • atorvastatin (LIPITOR) 10 MG tablet TAKE ONE TABLET BY MOUTH DAILY 90 tablet 1   • buPROPion SR (WELLBUTRIN SR) 150 MG 12 hr tablet TAKE ONE TABLET BY MOUTH TWICE A DAY 60 tablet 0   • escitalopram (LEXAPRO) 20 MG tablet TAKE ONE TABLET BY MOUTH DAILY 30 tablet 0   • hepatitis A (HAVRIX) 1440 EL U/ML vaccine Inject 1 dose now 1 mL 0   • meloxicam (MOBIC) 15 MG tablet TAKE ONE TABLET BY MOUTH DAILY WITH FOOD 60 tablet 0   • omeprazole (priLOSEC) 20 MG capsule Take 20 mg by mouth Every Other Day.     • propranolol LA (INDERAL LA) 60 MG 24 hr capsule TAKE ONE CAPSULE BY MOUTH DAILY 30 capsule 0     No current facility-administered medications on file prior to visit.       No Known Allergies     Review of Systems   Constitutional: Positive for activity change.   HENT:  "Negative.    Eyes: Negative.    Respiratory: Negative.    Cardiovascular: Negative.    Gastrointestinal: Negative.    Endocrine: Negative.    Genitourinary: Negative.    Musculoskeletal: Positive for arthralgias and joint swelling.   Skin: Negative.    Allergic/Immunologic: Negative.    Neurological: Negative.    Hematological: Negative.    Psychiatric/Behavioral: Negative.         The patient's Review of Systems was personally reviewed and confirmed as accurate.    Physical Exam  Pulse 90, height 167.6 cm (65.98\"), weight 83.5 kg (184 lb), last menstrual period 04/07/2018, SpO2 98 %, not currently breastfeeding.    Body mass index is 29.71 kg/m².    GENERAL APPEARANCE: awake, alert, oriented, in no acute distress and well developed, well nourished  LUNGS:  breathing nonlabored  EXTREMITIES: no clubbing, cyanosis  PERIPHERAL PULSES: palpable dorsalis pedis and posterior tibial pulses bilaterally.    GAIT:  Antalgic        ----------  Right Knee Exam:  ----------  ALIGNMENT: neutral  ----------  RANGE OF MOTION:  Normal (0-120 degrees) with no extensor lag or flexion contracture  LIGAMENTOUS STABILITY:   stable to varus and valgus stress at terminal extension and 30 degrees without any evidence of laxity  ----------  STRENGTH:  KNEE FLEXION 5/5  KNEE EXTENSION  5/5  ANKLE DORSIFLEXION  5/5  ANKLE PLANTARFLEXION  5/5  ----------  PAIN WITH PALPATION: pes bursa  KNEE EFFUSION: no  PAIN WITH KNEE ROM: no  PATELLAR CREPITUS:  no  ----------  SENSATION TO LIGHT TOUCH:  DEEP PERONEAL/SUPERFICIAL PERONEAL/SURAL/SAPHENOUS/TIBIAL:    intact  ----------  EDEMA:  no  ERYTHEMA:    no  WOUNDS/INCISIONS:  yes, well-healed arthroscopic portals  _____________________________________________________________________  _____________________________________________________________________    RADIOGRAPHIC FINDINGS:   Indication: Right knee pain    Comparison: Todays xrays were compared to previous xrays from 11/13/2018    Knee films: mild " to moderate varus osteoarthritis with slight medial compartment joint space narrowing, Mild patellofemoral osteoarthritis   and No significant changes compared to prior radiographs.    Assessment/Plan:   Diagnosis Plan   1. Osteochondritis dissecans of right knee  XR Knee 4+ View Right    Large Joint Arthrocentesis: R knee   2. Pes anserine bursitis       Based on her exam today.  She appears to have extra-articular source of pain.  She has pes bursitis.  I recommended a pes bursa cortisone injection today.  She is agreeable to this.  She will follow-up in 6 weeks.    Procedure Note:  I discussed with the patient the potential benefits of performing a therapeutic injection of the right knee pes bursa as well as potential risks including but not limited to infection, swelling, pain, bleeding, bruising, nerve/vessel damage, skin color changes, transient elevation in blood glucose levels, and fat atrophy. After informed consent and after the area was prepped with alcohol, ethyl chloride was used to numb the skin. Via the anteromedial approach, 2 cc of 1% lidocaine, 2 cc of 0.5% Naropin and 1 cc of 40mg/ml of Kenalog were injected into the right knee pes bursa. The patient tolerated the procedure well. There were no complications. A sterile dressing was placed over the injection site.      Steve Mauro MD  10/18/19  2:33 PM

## 2019-10-19 DIAGNOSIS — R25.1 TREMOR: ICD-10-CM

## 2019-10-21 RX ORDER — PROPRANOLOL HCL 60 MG
CAPSULE, EXTENDED RELEASE 24HR ORAL
Qty: 30 CAPSULE | Refills: 11 | Status: SHIPPED | OUTPATIENT
Start: 2019-10-21 | End: 2020-06-11

## 2019-10-25 ENCOUNTER — TELEPHONE (OUTPATIENT)
Dept: ORTHOPEDIC SURGERY | Facility: CLINIC | Age: 49
End: 2019-10-25

## 2019-10-25 NOTE — TELEPHONE ENCOUNTER
Rest through the weekend and if not any better by the middle of next week call and we can get her in to see Dr Ju Juan

## 2019-10-25 NOTE — TELEPHONE ENCOUNTER
HAD A CORTISONE INJECTION IN HER RIGHT KNEE AND THE PAIN GOT BETTER INITIALLY BUT NOW HER KNEE AND DOWN HER LEG IS ON FIRE .  JUST PAINFUL. SHE IS ELEVATING IT AND ICING AND TAKING MOBIC ANTIINFLAMMATORY PAIN MEDICATION FOR IT.  THE LAST TWO DAYS HAVE BEEN HECTIC FOR HER AT WORK AND SHE DOESN'T KNOW IF THE EXTRA WORK FLARED IT UP.  COULDN'T GO TO WORK TODAY BECAUSE OF THE PAIN.  HARD TO BEND HER KNEE.

## 2019-10-28 ENCOUNTER — TELEPHONE (OUTPATIENT)
Dept: ORTHOPEDIC SURGERY | Facility: CLINIC | Age: 49
End: 2019-10-28

## 2019-10-28 DIAGNOSIS — M25.461 EFFUSION OF BURSA OF RIGHT KNEE: Primary | ICD-10-CM

## 2019-10-28 DIAGNOSIS — R25.1 TREMOR: ICD-10-CM

## 2019-10-28 NOTE — TELEPHONE ENCOUNTER
I spoke with Ms. Naik she stated that she has rested, iced and braced her right knee but is now having pain in other areas of the knee now. If she twist her knee and is active she has a burning sensation and pain. Her Pes Bursa site of her knee is better only if she is not active, she is still having a burning sensation and it is intense pain. She is wanting to know if she needs to have an MRI or go to PT? Please advise, thank you.

## 2019-10-28 NOTE — TELEPHONE ENCOUNTER
WANTS A CALL BACK ABOUT HER RIGHT KNEE BECAUSE IT'S NOT GETTING BETTER.  WILL GIVE THE NURSE THE DETAILS

## 2019-11-06 ENCOUNTER — TELEPHONE (OUTPATIENT)
Dept: ORTHOPEDIC SURGERY | Facility: CLINIC | Age: 49
End: 2019-11-06

## 2019-11-06 ENCOUNTER — HOSPITAL ENCOUNTER (OUTPATIENT)
Dept: MRI IMAGING | Facility: HOSPITAL | Age: 49
Discharge: HOME | End: 2019-11-06
Admitting: ORTHOPAEDIC SURGERY

## 2019-11-06 DIAGNOSIS — M25.461 EFFUSION OF BURSA OF RIGHT KNEE: ICD-10-CM

## 2019-11-06 PROCEDURE — 73721 MRI JNT OF LWR EXTRE W/O DYE: CPT

## 2019-11-06 RX ORDER — ALPRAZOLAM 1 MG/1
TABLET ORAL
Qty: 60 TABLET | Refills: 0 | Status: CANCELLED | OUTPATIENT
Start: 2019-11-06

## 2019-11-06 NOTE — TELEPHONE ENCOUNTER
Nava Mauro send bottom message -      contacted the patient and explained that Dr Mauro is in surgery today and I may not get an answer until tomorrow.   I told her I would call her as soon as he responds.      Call patient back at this number : [phone]    Yessica

## 2019-11-06 NOTE — TELEPHONE ENCOUNTER
Regarding: Test Results Question  Contact: [phone]  ----- Message from MIGSIF, Generic sent at 11/6/2019  2:24 PM EST -----    Hello,  I have completed my MRI.  I am still having pain and was wondering if i should schedule another appointment  I guess that would be pending the results  of the MRI.  I do not want to be a pest but I am scheduled to work the next 2 days for twelve hour shifts.  If i should not be working these i would need to know.  Thanks for your help.  Eugenia

## 2019-11-07 NOTE — TELEPHONE ENCOUNTER
MRI shows no mechanical problems/meniscus tear associated with the knee.  The MRI actually shows improved healing of the focal lesion of the tibia that was treated with the last arthroscopy.  I am happy to see her in clinic to reevaluate the knee, but I do not see any intra-articular pathology apart from arthritis.

## 2019-11-07 NOTE — TELEPHONE ENCOUNTER
I sent patient a Loudr message and I spoke with the patient and she is fine with this and will call if she needs to come in and see Dr. Mauro.  Yessica

## 2019-11-08 RX ORDER — BUPROPION HYDROCHLORIDE 150 MG/1
TABLET, EXTENDED RELEASE ORAL
Qty: 60 TABLET | Refills: 11 | Status: SHIPPED | OUTPATIENT
Start: 2019-11-08 | End: 2020-09-30

## 2019-11-08 RX ORDER — ESCITALOPRAM OXALATE 20 MG/1
TABLET ORAL
Qty: 30 TABLET | Refills: 11 | Status: SHIPPED | OUTPATIENT
Start: 2019-11-08 | End: 2020-07-16 | Stop reason: ALTCHOICE

## 2019-11-20 RX ORDER — ALPRAZOLAM 1 MG/1
1 TABLET ORAL 2 TIMES DAILY
Qty: 60 TABLET | Refills: 3 | OUTPATIENT
Start: 2019-11-20 | End: 2020-12-31 | Stop reason: SDUPTHER

## 2019-12-03 ENCOUNTER — OFFICE VISIT (OUTPATIENT)
Dept: ORTHOPEDIC SURGERY | Facility: CLINIC | Age: 49
End: 2019-12-03

## 2019-12-03 VITALS — WEIGHT: 182 LBS | HEART RATE: 73 BPM | HEIGHT: 66 IN | BODY MASS INDEX: 29.25 KG/M2 | OXYGEN SATURATION: 98 %

## 2019-12-03 DIAGNOSIS — M93.261 OSTEOCHONDRITIS DISSECANS OF KNEE, RIGHT: ICD-10-CM

## 2019-12-03 DIAGNOSIS — M17.11 PRIMARY OSTEOARTHRITIS OF RIGHT KNEE: Primary | ICD-10-CM

## 2019-12-03 PROCEDURE — 99213 OFFICE O/P EST LOW 20 MIN: CPT | Performed by: ORTHOPAEDIC SURGERY

## 2019-12-03 PROCEDURE — 20610 DRAIN/INJ JOINT/BURSA W/O US: CPT | Performed by: ORTHOPAEDIC SURGERY

## 2019-12-03 RX ORDER — TRIAMCINOLONE ACETONIDE 40 MG/ML
80 INJECTION, SUSPENSION INTRA-ARTICULAR; INTRAMUSCULAR
Status: COMPLETED | OUTPATIENT
Start: 2019-12-03 | End: 2019-12-03

## 2019-12-03 RX ORDER — LIDOCAINE HYDROCHLORIDE 10 MG/ML
3 INJECTION, SOLUTION EPIDURAL; INFILTRATION; INTRACAUDAL; PERINEURAL
Status: COMPLETED | OUTPATIENT
Start: 2019-12-03 | End: 2019-12-03

## 2019-12-03 RX ADMIN — LIDOCAINE HYDROCHLORIDE 3 ML: 10 INJECTION, SOLUTION EPIDURAL; INFILTRATION; INTRACAUDAL; PERINEURAL at 10:31

## 2019-12-03 RX ADMIN — TRIAMCINOLONE ACETONIDE 80 MG: 40 INJECTION, SUSPENSION INTRA-ARTICULAR; INTRAMUSCULAR at 10:31

## 2019-12-03 NOTE — PROGRESS NOTES
Procedure   Large Joint Arthrocentesis: R knee  Date/Time: 12/3/2019 10:31 AM  Consent given by: patient  Site marked: site marked  Timeout: Immediately prior to procedure a time out was called to verify the correct patient, procedure, equipment, support staff and site/side marked as required   Supporting Documentation  Indications: pain   Procedure Details  Location: knee - R knee  Preparation: Patient was prepped and draped in the usual sterile fashion  Needle size: 22 G  Approach: anterolateral  Medications administered: 3 mL lidocaine PF 1% 1 %; 80 mg triamcinolone acetonide 40 MG/ML (3 cc Bupiviciane .25% NDC: 55150-167-10; LOT: [id number]; EXP: 04/01/2022)  Patient tolerance: patient tolerated the procedure well with no immediate complications

## 2019-12-03 NOTE — PROGRESS NOTES
Orthopaedic Clinic Note: Knee Established Patient    Chief Complaint   Patient presents with   • Right Knee - Follow-up     6 week f/u; Pes Bursa injection last visit (10/18/19)        HPI    It has been 6  week(s) since Ms. Naik's last visit. She returns to clinic today for follow-up right knee pain.  She underwent a pes bursa injection 6 weeks ago.  The injection did provide some transient relief but her pain is persisting.  She is complaining of pain primarily along the medial joint line with associated stiffness and occasional swelling at the end of the day.  She rates her pain 3/10 on the pain scale.  She continues take Mobic with only slight improvement.  She is here to discuss further treatment options.  She continues to undergo physical therapy with only minor improvements.    Past Medical History:   Diagnosis Date   • Acute maxillary sinusitis    • Anxiety and depression    • Arthralgia    • Arthritis     knee and foot   • Colicky RUQ abdominal pain    • Constipation    • Depression    • Dizziness and giddiness    • Essential tremor     propranolol for this   • GERD (gastroesophageal reflux disease)    • High cholesterol    • Lumbago    • Metatarsalgia, right foot    • PONV (postoperative nausea and vomiting)    • Pustule    • Spinal headache     with csection   • Tremor    • Wears reading eyeglasses       Past Surgical History:   Procedure Laterality Date   • ADENOIDECTOMY     •  SECTION     • CHOLECYSTECTOMY     • COLONOSCOPY  2018   • ENDOMETRIAL ABLATION     • KNEE ARTHROSCOPY Right 2018    Procedure: KNEE ARTHROSCOPY RIGHT;  Surgeon: Steve Mauro MD;  Location: [facility] OR;  Service: Orthopedics   • TONSILLECTOMY     • TOTAL LAPAROSCOPIC HYSTERECTOMY N/A 2018    Procedure: TOTAL LAPAROSCOPIC HYSTERECTOMY WITH DAVINCI WITH BILATERAL SALPINGO OOPHERECTOMY, SKIN TAG REMOVAL FROM INNER LABIA MINORA, CYSTO;  Surgeon: Renetta Artis MD;  Location: [facility] OR;  Service:  Obstetrics/Gynecology      Family History   Problem Relation Age of Onset   • Stroke Mother    • Heart disease Mother    • Arthritis Mother    • Cancer Other    • Heart disease Other    • Depression Other    • Heart attack Other    • Depression Child    • Anesthesia problems Other    • Rheum arthritis Other    • Hypertension Other    • Hyperlipidemia Daughter      Social History     Socioeconomic History   • Marital status:      Spouse name: Not on file   • Number of children: Not on file   • Years of education: Not on file   • Highest education level: Not on file   Tobacco Use   • Smoking status: Never Smoker   • Smokeless tobacco: Never Used   Substance and Sexual Activity   • Alcohol use: Yes     Types: 1 Glasses of wine, 1 Cans of beer per week     Comment: Occ   • Drug use: No   • Sexual activity: Yes     Partners: Male     Birth control/protection: Surgical      Current Outpatient Medications on File Prior to Visit   Medication Sig Dispense Refill   • ALPRAZolam (XANAX) 1 MG tablet Take 1 tablet by mouth 2 (Two) Times a Day. 60 tablet 3   • atorvastatin (LIPITOR) 10 MG tablet TAKE ONE TABLET BY MOUTH DAILY 90 tablet 1   • buPROPion SR (WELLBUTRIN SR) 150 MG 12 hr tablet TAKE ONE TABLET BY MOUTH TWICE A DAY 60 tablet 11   • escitalopram (LEXAPRO) 20 MG tablet TAKE ONE TABLET BY MOUTH DAILY 30 tablet 11   • hepatitis A (HAVRIX) 1440 EL U/ML vaccine Inject 1 dose now 1 mL 0   • meloxicam (MOBIC) 15 MG tablet TAKE ONE TABLET BY MOUTH DAILY WITH FOOD 60 tablet 0   • omeprazole (priLOSEC) 20 MG capsule Take 20 mg by mouth Every Other Day.     • propranolol LA (INDERAL LA) 60 MG 24 hr capsule TAKE ONE CAPSULE BY MOUTH DAILY 30 capsule 11     No current facility-administered medications on file prior to visit.       No Known Allergies     Review of Systems   Constitutional: Positive for activity change.   HENT: Negative.    Eyes: Negative.    Respiratory: Negative.    Cardiovascular: Negative.   "  Gastrointestinal: Negative.    Endocrine: Negative.    Genitourinary: Negative.    Musculoskeletal: Positive for arthralgias and myalgias.   Skin: Negative.    Allergic/Immunologic: Negative.    Neurological: Negative.    Hematological: Negative.    Psychiatric/Behavioral: Negative.         The patient's Review of Systems was personally reviewed and confirmed as accurate.    Physical Exam  Pulse 73, height 167.6 cm (65.98\"), weight 82.6 kg (182 lb), last menstrual period 04/07/2018, SpO2 98 %, not currently breastfeeding.    Body mass index is 29.39 kg/m².    GENERAL APPEARANCE: awake, alert, oriented, in no acute distress and well developed, well nourished  LUNGS:  breathing nonlabored  EXTREMITIES: no clubbing, cyanosis  PERIPHERAL PULSES: palpable dorsalis pedis and posterior tibial pulses bilaterally.    GAIT:  Normal        ----------  Right Knee Exam:  ----------  ALIGNMENT: neutral  ----------  RANGE OF MOTION:  Normal (0-120 degrees) with no extensor lag or flexion contracture  LIGAMENTOUS STABILITY:   stable to varus and valgus stress at terminal extension and 30 degrees without any evidence of laxity  ----------  STRENGTH:  KNEE FLEXION 5/5  KNEE EXTENSION  5/5  ANKLE DORSIFLEXION  5/5  ANKLE PLANTARFLEXION  5/5  ----------  PAIN WITH PALPATION: Medial joint line and pes bursa  KNEE EFFUSION: Trace  PAIN WITH KNEE ROM: no  PATELLAR CREPITUS:  no  ----------  SENSATION TO LIGHT TOUCH:  DEEP PERONEAL/SUPERFICIAL PERONEAL/SURAL/SAPHENOUS/TIBIAL:    intact  ----------  EDEMA:  no  ERYTHEMA:    no  WOUNDS/INCISIONS:  yes, well-healed arthroscopic portals  _____________________________________________________________________  _____________________________________________________________________    RADIOGRAPHIC FINDINGS:   No new imaging today    Assessment/Plan:   Diagnosis Plan   1. Primary osteoarthritis of right knee     2. Osteochondritis dissecans of knee, right  Large Joint Arthrocentesis: R knee     I " discussed treatment options with the patient.  Her symptoms appear to be more intra-articular in nature.  I recommended intra-articular steroid injection today.  She is agreeable to this.  She will follow-up as needed.    Procedure Note:  I discussed with the patient the potential benefits of performing a therapeutic injection of the right knee as well as potential risks including but not limited to infection, swelling, pain, bleeding, bruising, nerve/vessel damage, skin color changes, transient elevation in blood glucose levels, and fat atrophy. After informed consent and after the area was prepped with alcohol, ethyl chloride was used to numb the skin. Via the superior lateral approach, 3cc of 1% lidocaine, 3cc of 0.25% bupivicaine and 2 cc of 40mg/ml of Kenalog were injected into the right knee. The patient tolerated the procedure well. There were no complications. A sterile dressing was placed over the injection site.        Steve Mauro MD  12/03/19  10:39 AM

## 2019-12-08 DIAGNOSIS — M70.50 PES ANSERINE BURSITIS: ICD-10-CM

## 2019-12-08 DIAGNOSIS — M93.261 OSTEOCHONDRITIS DISSECANS OF RIGHT KNEE: ICD-10-CM

## 2019-12-09 RX ORDER — MELOXICAM 15 MG/1
TABLET ORAL
Qty: 30 TABLET | Refills: 0 | Status: SHIPPED | OUTPATIENT
Start: 2019-12-09 | End: 2020-01-06

## 2020-01-06 DIAGNOSIS — M70.50 PES ANSERINE BURSITIS: ICD-10-CM

## 2020-01-06 DIAGNOSIS — M93.261 OSTEOCHONDRITIS DISSECANS OF RIGHT KNEE: ICD-10-CM

## 2020-01-06 RX ORDER — MELOXICAM 15 MG/1
TABLET ORAL
Qty: 30 TABLET | Refills: 0 | Status: SHIPPED | OUTPATIENT
Start: 2020-01-06 | End: 2020-05-28

## 2020-03-26 DIAGNOSIS — Z00.00 ANNUAL PHYSICAL EXAM: ICD-10-CM

## 2020-03-26 RX ORDER — ATORVASTATIN CALCIUM 10 MG/1
TABLET, FILM COATED ORAL
Qty: 90 TABLET | Refills: 0 | Status: SHIPPED | OUTPATIENT
Start: 2020-03-26 | End: 2020-06-09 | Stop reason: SDUPTHER

## 2020-04-09 DIAGNOSIS — Z00.00 ANNUAL PHYSICAL EXAM: ICD-10-CM

## 2020-04-09 RX ORDER — ATORVASTATIN CALCIUM 10 MG/1
TABLET, FILM COATED ORAL
Qty: 90 TABLET | Refills: 0 | OUTPATIENT
Start: 2020-04-09

## 2020-05-08 DIAGNOSIS — Z00.00 ANNUAL PHYSICAL EXAM: ICD-10-CM

## 2020-05-12 ENCOUNTER — LAB (OUTPATIENT)
Dept: LAB | Facility: HOSPITAL | Age: 50
End: 2020-05-12

## 2020-05-12 ENCOUNTER — OFFICE VISIT (OUTPATIENT)
Dept: FAMILY MEDICINE CLINIC | Facility: CLINIC | Age: 50
End: 2020-05-12

## 2020-05-12 VITALS
HEIGHT: 66 IN | TEMPERATURE: 97.1 F | SYSTOLIC BLOOD PRESSURE: 126 MMHG | HEART RATE: 81 BPM | OXYGEN SATURATION: 99 % | WEIGHT: 194 LBS | BODY MASS INDEX: 31.18 KG/M2 | RESPIRATION RATE: 16 BRPM | DIASTOLIC BLOOD PRESSURE: 82 MMHG

## 2020-05-12 DIAGNOSIS — M25.541 ARTHRALGIA OF BOTH HANDS: Primary | ICD-10-CM

## 2020-05-12 DIAGNOSIS — M25.542 ARTHRALGIA OF BOTH HANDS: ICD-10-CM

## 2020-05-12 DIAGNOSIS — M25.541 ARTHRALGIA OF BOTH HANDS: ICD-10-CM

## 2020-05-12 DIAGNOSIS — M25.542 ARTHRALGIA OF BOTH HANDS: Primary | ICD-10-CM

## 2020-05-12 LAB
BASOPHILS # BLD AUTO: 0.05 10*3/MM3 (ref 0–0.2)
BASOPHILS NFR BLD AUTO: 1.1 % (ref 0–1.5)
CHROMATIN AB SERPL-ACNC: 10.9 IU/ML (ref 0–14)
DEPRECATED RDW RBC AUTO: 41.1 FL (ref 37–54)
EOSINOPHIL # BLD AUTO: 0.15 10*3/MM3 (ref 0–0.4)
EOSINOPHIL NFR BLD AUTO: 3.3 % (ref 0.3–6.2)
ERYTHROCYTE [DISTWIDTH] IN BLOOD BY AUTOMATED COUNT: 12.7 % (ref 12.3–15.4)
HCT VFR BLD AUTO: 43.3 % (ref 34–46.6)
HGB BLD-MCNC: 14.5 G/DL (ref 12–15.9)
IMM GRANULOCYTES # BLD AUTO: 0.01 10*3/MM3 (ref 0–0.05)
IMM GRANULOCYTES NFR BLD AUTO: 0.2 % (ref 0–0.5)
LYMPHOCYTES # BLD AUTO: 0.77 10*3/MM3 (ref 0.7–3.1)
LYMPHOCYTES NFR BLD AUTO: 16.7 % (ref 19.6–45.3)
MCH RBC QN AUTO: 29.4 PG (ref 26.6–33)
MCHC RBC AUTO-ENTMCNC: 33.5 G/DL (ref 31.5–35.7)
MCV RBC AUTO: 87.8 FL (ref 79–97)
MONOCYTES # BLD AUTO: 0.49 10*3/MM3 (ref 0.1–0.9)
MONOCYTES NFR BLD AUTO: 10.7 % (ref 5–12)
NEUTROPHILS # BLD AUTO: 3.13 10*3/MM3 (ref 1.7–7)
NEUTROPHILS NFR BLD AUTO: 68 % (ref 42.7–76)
NRBC BLD AUTO-RTO: 0 /100 WBC (ref 0–0.2)
PLATELET # BLD AUTO: 245 10*3/MM3 (ref 140–450)
PMV BLD AUTO: 10 FL (ref 6–12)
RBC # BLD AUTO: 4.93 10*6/MM3 (ref 3.77–5.28)
WBC NRBC COR # BLD: 4.6 10*3/MM3 (ref 3.4–10.8)

## 2020-05-12 PROCEDURE — 85025 COMPLETE CBC W/AUTO DIFF WBC: CPT

## 2020-05-12 PROCEDURE — 99213 OFFICE O/P EST LOW 20 MIN: CPT | Performed by: PHYSICIAN ASSISTANT

## 2020-05-12 PROCEDURE — 85652 RBC SED RATE AUTOMATED: CPT

## 2020-05-12 PROCEDURE — 86431 RHEUMATOID FACTOR QUANT: CPT | Performed by: PHYSICIAN ASSISTANT

## 2020-05-12 PROCEDURE — 36415 COLL VENOUS BLD VENIPUNCTURE: CPT | Performed by: PHYSICIAN ASSISTANT

## 2020-05-12 PROCEDURE — 86038 ANTINUCLEAR ANTIBODIES: CPT

## 2020-05-12 RX ORDER — PREDNISONE 20 MG/1
20 TABLET ORAL 2 TIMES DAILY
Qty: 14 TABLET | Refills: 0 | Status: SHIPPED | OUTPATIENT
Start: 2020-05-12 | End: 2020-06-26

## 2020-05-12 RX ORDER — ATORVASTATIN CALCIUM 10 MG/1
TABLET, FILM COATED ORAL
Qty: 90 TABLET | Refills: 0 | OUTPATIENT
Start: 2020-05-12

## 2020-05-12 RX ORDER — ALPRAZOLAM 1 MG/1
TABLET ORAL
Qty: 60 TABLET | Refills: 2 | OUTPATIENT
Start: 2020-05-12

## 2020-05-12 NOTE — PROGRESS NOTES
Subjective   Eugenia Naik is a 49 y.o. female  Wrist Pain (2 weeks - radiating, burning in to hands and up to elbows ) and Knee Pain (JOints ache )      History of Present Illness  Patient a pleasant 49-year-old white female who comes in complaining of bilateral hand pain and stiffness pain is in both elbows shoulders and hips patient concerned about autoimmune disorder patient that she has stiffness and pain she noted some soreness in her proximal PIP joints stiffness she has history of rheumatoid arthritis and family she is stiffness and pain  The following portions of the patient's history were reviewed and updated as appropriate: allergies, current medications, past social history and problem list    Review of Systems   Constitutional: Negative for fatigue and unexpected weight change.   Respiratory: Negative for cough, chest tightness and shortness of breath.    Cardiovascular: Negative for chest pain, palpitations and leg swelling.   Gastrointestinal: Negative for nausea.   Musculoskeletal: Positive for arthralgias, back pain, gait problem and joint swelling.   Skin: Negative for color change and rash.   Neurological: Negative for dizziness, syncope, weakness and headaches.       Objective     Vitals:    05/12/20 1031   BP: 126/82   Pulse: 81   Resp: 16   Temp: 97.1 °F (36.2 °C)   SpO2: 99%       Physical Exam   Constitutional: She appears well-developed and well-nourished.   Neck: No JVD present.   Cardiovascular: Normal rate, regular rhythm, normal heart sounds, intact distal pulses and normal pulses.   No murmur heard.  Pulmonary/Chest: Effort normal and breath sounds normal. No respiratory distress.   Abdominal: Soft. Bowel sounds are normal. There is no hepatosplenomegaly. There is no tenderness.   Musculoskeletal: She exhibits no edema.        Right wrist: She exhibits decreased range of motion and tenderness.        Left wrist: She exhibits decreased range of motion, tenderness and bony tenderness.    Skin: Skin is warm and dry.   Nursing note and vitals reviewed.      Assessment/Plan     Eugenia was seen today for wrist pain and knee pain.    Diagnoses and all orders for this visit:    Arthralgia of both hands  -     SUSAN; Future  -     Rheumatoid Factor  -     Sedimentation Rate; Future  -     CBC & Differential; Future  -     predniSONE (DELTASONE) 20 MG tablet; Take 1 tablet by mouth 2 (Two) Times a Day.    Follow-up if no better in a week  Answers for HPI/ROS submitted by the patient on 5/7/2020   What is the primary reason for your visit?: Other  Please describe your symptoms.: Painful ,swollen wrists and hands. Also painful ankles and feet  Have you had these symptoms before?: No  How long have you been having these symptoms?: 1-4 weeks ago  Please list any medications you are currently taking for this condition.: Ibuprofen  Please describe any probable cause for these symptoms. : Unsure of why. I have had no injury or excessive use. I am questioning RA.

## 2020-05-13 LAB — ERYTHROCYTE [SEDIMENTATION RATE] IN BLOOD: 1 MM/HR (ref 0–20)

## 2020-05-14 LAB — ANA SER QL: NEGATIVE

## 2020-05-15 ENCOUNTER — PATIENT MESSAGE (OUTPATIENT)
Dept: FAMILY MEDICINE CLINIC | Facility: CLINIC | Age: 50
End: 2020-05-15

## 2020-05-15 ENCOUNTER — TELEPHONE (OUTPATIENT)
Dept: FAMILY MEDICINE CLINIC | Facility: CLINIC | Age: 50
End: 2020-05-15

## 2020-05-15 NOTE — TELEPHONE ENCOUNTER
----- Message from Eugenia Naik sent at 5/15/2020 10:26 AM EDT -----  Regarding: Test Results Question  Contact: [phone]  Ady,  So my labs are normal with the exception of leukocytes. What is the next step?  My symptoms have improved with the steroids. I still have tingling in my hands and wrists but no longer the burning and pain.

## 2020-05-15 NOTE — TELEPHONE ENCOUNTER
Well I am hoping that it was just the acute inflammatory episode the next step is to see when she stopped the steroids if her symptoms return at the same severity

## 2020-05-28 ENCOUNTER — TELEPHONE (OUTPATIENT)
Dept: FAMILY MEDICINE CLINIC | Facility: CLINIC | Age: 50
End: 2020-05-28

## 2020-05-28 RX ORDER — NABUMETONE 500 MG/1
500 TABLET, FILM COATED ORAL 2 TIMES DAILY PRN
Qty: 20 TABLET | Refills: 0 | Status: SHIPPED | OUTPATIENT
Start: 2020-05-28 | End: 2020-06-09 | Stop reason: SDUPTHER

## 2020-05-28 NOTE — TELEPHONE ENCOUNTER
Lets try Relafen 500 mg twice a day for 10 days and if she still having issues this go ahead and refer her to rheumatology

## 2020-05-28 NOTE — TELEPHONE ENCOUNTER
----- Message from Eugenia Naik sent at 5/28/2020  8:08 AM EDT -----  Regarding: Non-Urgent Medical Question  Contact: [phone]  I am still having pain in my wrists and elbows with some swelling in my wrists. It is not as intense as before but is making it more difficult to do things with hands and wrists. I have been taking my Mobic daily and have finished my steroids a while ago.  I am not really sure what to do next or if it is something like arthritis that I just have to get used to.

## 2020-06-05 ENCOUNTER — PATIENT MESSAGE (OUTPATIENT)
Dept: FAMILY MEDICINE CLINIC | Facility: CLINIC | Age: 50
End: 2020-06-05

## 2020-06-05 ENCOUNTER — TELEPHONE (OUTPATIENT)
Dept: FAMILY MEDICINE CLINIC | Facility: CLINIC | Age: 50
End: 2020-06-05

## 2020-06-05 DIAGNOSIS — M19.90 ARTHRITIS: Primary | ICD-10-CM

## 2020-06-05 NOTE — TELEPHONE ENCOUNTER
----- Message from Eugenia Naik sent at 6/5/2020  8:46 AM EDT -----  Regarding: Visit Follow-Up Question  Contact: [phone]  I am still experiencing pain, burning and stiffness in my hands, wrists, knees and ankles.   I have been taking the medication which reduces the pain, however the more I move the worse I feel especially my wrists and knees.  I am also very tired.    I think we need to think out of the box and pursue this further. Something isn't right. I shouldn't feel this way. Please help me with this next step. It is NOT my depression, although I have had a hard time during Covid.

## 2020-06-09 DIAGNOSIS — Z00.00 ANNUAL PHYSICAL EXAM: ICD-10-CM

## 2020-06-09 RX ORDER — ATORVASTATIN CALCIUM 10 MG/1
10 TABLET, FILM COATED ORAL DAILY
Qty: 90 TABLET | Refills: 0 | Status: SHIPPED | OUTPATIENT
Start: 2020-06-09 | End: 2020-06-22

## 2020-06-09 RX ORDER — ALPRAZOLAM 1 MG/1
1 TABLET ORAL 2 TIMES DAILY
Qty: 60 TABLET | Refills: 3 | Status: CANCELLED | OUTPATIENT
Start: 2020-06-09

## 2020-06-09 NOTE — TELEPHONE ENCOUNTER
Eugenia Naik would like a refill of the following medications:         ALPRAZolam (XANAX) 1 MG tablet [ERICKA Hunt MD]       Patient Comment: do i need to  a new script?     Preferred pharmacy: [facility] [address] [place] [address] [place] [address] [facility] [phone] [facility] [phone] [facility]   Delivery method: Pickup     Last appt: 05/12/2020

## 2020-06-10 RX ORDER — NABUMETONE 500 MG/1
500 TABLET, FILM COATED ORAL 2 TIMES DAILY PRN
Qty: 20 TABLET | Refills: 0 | Status: SHIPPED | OUTPATIENT
Start: 2020-06-10 | End: 2020-06-11

## 2020-06-11 DIAGNOSIS — R25.1 TREMOR: ICD-10-CM

## 2020-06-11 RX ORDER — PROPRANOLOL HCL 60 MG
CAPSULE, EXTENDED RELEASE 24HR ORAL
Qty: 30 CAPSULE | Refills: 0 | Status: SHIPPED | OUTPATIENT
Start: 2020-06-11 | End: 2020-09-01

## 2020-06-11 RX ORDER — NABUMETONE 500 MG/1
TABLET, FILM COATED ORAL
Qty: 20 TABLET | Refills: 0 | Status: SHIPPED | OUTPATIENT
Start: 2020-06-11 | End: 2020-06-22

## 2020-06-21 DIAGNOSIS — Z00.00 ANNUAL PHYSICAL EXAM: ICD-10-CM

## 2020-06-22 RX ORDER — NABUMETONE 500 MG/1
TABLET, FILM COATED ORAL
Qty: 20 TABLET | Refills: 0 | Status: SHIPPED | OUTPATIENT
Start: 2020-06-22 | End: 2020-09-01

## 2020-06-22 RX ORDER — ATORVASTATIN CALCIUM 10 MG/1
TABLET, FILM COATED ORAL
Qty: 90 TABLET | Refills: 0 | Status: SHIPPED | OUTPATIENT
Start: 2020-06-22 | End: 2020-09-16

## 2020-06-23 ENCOUNTER — TELEPHONE (OUTPATIENT)
Dept: FAMILY MEDICINE CLINIC | Facility: CLINIC | Age: 50
End: 2020-06-23

## 2020-06-26 ENCOUNTER — OFFICE VISIT (OUTPATIENT)
Dept: FAMILY MEDICINE CLINIC | Facility: CLINIC | Age: 50
End: 2020-06-26

## 2020-06-26 VITALS
HEIGHT: 66 IN | OXYGEN SATURATION: 99 % | RESPIRATION RATE: 14 BRPM | TEMPERATURE: 97.7 F | WEIGHT: 204 LBS | DIASTOLIC BLOOD PRESSURE: 84 MMHG | HEART RATE: 72 BPM | SYSTOLIC BLOOD PRESSURE: 126 MMHG | BODY MASS INDEX: 32.78 KG/M2

## 2020-06-26 DIAGNOSIS — G89.29 CHRONIC NONINTRACTABLE HEADACHE, UNSPECIFIED HEADACHE TYPE: Primary | ICD-10-CM

## 2020-06-26 DIAGNOSIS — R51.9 CHRONIC NONINTRACTABLE HEADACHE, UNSPECIFIED HEADACHE TYPE: Primary | ICD-10-CM

## 2020-06-26 DIAGNOSIS — G62.9 NEUROPATHY: ICD-10-CM

## 2020-06-26 DIAGNOSIS — R53.83 FATIGUE, UNSPECIFIED TYPE: ICD-10-CM

## 2020-06-26 PROCEDURE — 99213 OFFICE O/P EST LOW 20 MIN: CPT | Performed by: PHYSICIAN ASSISTANT

## 2020-06-26 NOTE — PROGRESS NOTES
Subjective   Eugenia Naik is a 50 y.o. female  Tingling (Bilat arms, hands, face, tongue) and Joint Pain (Has rheum appt in August)      History of Present Illness  Patient is a pleasant 50-year-old white female who comes in for follow-up of chronic pain joint pain fatigue and headaches.  Patient was seen by me earlier before the pandemic and with work-up for generalized joint pain everything was negative we had set up an appointment for rheumatology since that time patient states symptoms have changed she still have the joint pain but now she is having some numbness in both right and left upper extremities extreme weakness she is dropping objects she states she feels really weak in her right upper extremity also having some severe headaches which are worse than normal she describes these headaches are 9 out of 10 and coming out she is also having some weird numbness in her face around her tongue and sometimes notices when she talks she is very scared she thinks she could possibly have MS she is very concerned due to the weakness and increasing neuropathy in both upper and lower extremities along with the numbness in her tongue.  Patient point fatigue no energy this is been going on for last couple months but seems to be getting worse    The following portions of the patient's history were reviewed and updated as appropriate: allergies, current medications, past social history and problem list    Review of Systems   Constitutional: Negative for fatigue and unexpected weight change.   Respiratory: Negative for cough, chest tightness and shortness of breath.    Cardiovascular: Negative for chest pain, palpitations and leg swelling.   Gastrointestinal: Negative for nausea.   Musculoskeletal: Positive for back pain.   Skin: Negative for color change and rash.   Neurological: Positive for numbness. Negative for dizziness, syncope, weakness and headaches.       Objective     Vitals:    06/26/20 1542   BP: 126/84    Pulse: 72   Resp: 14   Temp: 97.7 °F (36.5 °C)   SpO2: 99%       Physical Exam   Constitutional: She appears well-developed and well-nourished.   Neck: No JVD present.   Cardiovascular: Normal rate, regular rhythm, normal heart sounds, intact distal pulses and normal pulses.   No murmur heard.  Pulmonary/Chest: Effort normal and breath sounds normal. No respiratory distress.   Abdominal: Soft. Bowel sounds are normal. There is no hepatosplenomegaly. There is no tenderness.   Musculoskeletal: She exhibits no edema.   Skin: Skin is warm and dry.   Nursing note and vitals reviewed.      Assessment/Plan     Eugenia was seen today for tingling and joint pain.    Diagnoses and all orders for this visit:    Chronic nonintractable headache, unspecified headache type  -     MRI Brain Without Contrast; Future    Neuropathy  -     EMG & Nerve Conduction Test; Future    Fatigue, unspecified type  -     Comprehensive Metabolic Panel       Answers for HPI/ROS submitted by the patient on 6/23/2020   What is the primary reason for your visit?: Other  Please describe your symptoms.: Pin and needles in arms,hands and face with painful joints. Also in tip of tongue  Have you had these symptoms before?: Yes  How long have you been having these symptoms?: Greater than 2 weeks  Please list any medications you are currently taking for this condition.: Nabumatone  Please describe any probable cause for these symptoms. : ??  Follow-up after MRI long discussion with patient.current symptoms she is to follow-up after Testing

## 2020-06-29 ENCOUNTER — APPOINTMENT (OUTPATIENT)
Dept: LAB | Facility: HOSPITAL | Age: 50
End: 2020-06-29

## 2020-06-30 ENCOUNTER — LAB (OUTPATIENT)
Dept: LAB | Facility: HOSPITAL | Age: 50
End: 2020-06-30

## 2020-06-30 LAB
ALBUMIN SERPL-MCNC: 4.3 G/DL (ref 3.5–5.2)
ALBUMIN/GLOB SERPL: 1.9 G/DL
ALP SERPL-CCNC: 77 U/L (ref 39–117)
ALT SERPL W P-5'-P-CCNC: 19 U/L (ref 1–33)
ANION GAP SERPL CALCULATED.3IONS-SCNC: 9.5 MMOL/L (ref 5–15)
AST SERPL-CCNC: <5 U/L (ref 1–32)
BILIRUB SERPL-MCNC: 0.4 MG/DL (ref 0.2–1.2)
BUN SERPL-MCNC: 13 MG/DL (ref 6–20)
BUN/CREAT SERPL: 12.9 (ref 7–25)
CALCIUM SPEC-SCNC: 9.4 MG/DL (ref 8.6–10.5)
CHLORIDE SERPL-SCNC: 104 MMOL/L (ref 98–107)
CO2 SERPL-SCNC: 25.5 MMOL/L (ref 22–29)
CREAT SERPL-MCNC: 1.01 MG/DL (ref 0.57–1)
GFR SERPL CREATININE-BSD FRML MDRD: 58 ML/MIN/1.73
GLOBULIN UR ELPH-MCNC: 2.3 GM/DL
GLUCOSE SERPL-MCNC: 103 MG/DL (ref 65–99)
POTASSIUM SERPL-SCNC: 4.4 MMOL/L (ref 3.5–5.2)
PROT SERPL-MCNC: 6.6 G/DL (ref 6–8.5)
SODIUM SERPL-SCNC: 139 MMOL/L (ref 136–145)

## 2020-06-30 PROCEDURE — 80053 COMPREHEN METABOLIC PANEL: CPT | Performed by: PHYSICIAN ASSISTANT

## 2020-06-30 PROCEDURE — 36415 COLL VENOUS BLD VENIPUNCTURE: CPT | Performed by: PHYSICIAN ASSISTANT

## 2020-07-06 ENCOUNTER — HOSPITAL ENCOUNTER (OUTPATIENT)
Dept: MRI IMAGING | Facility: HOSPITAL | Age: 50
Discharge: HOME | End: 2020-07-06
Admitting: PHYSICIAN ASSISTANT

## 2020-07-06 DIAGNOSIS — R51.9 CHRONIC NONINTRACTABLE HEADACHE, UNSPECIFIED HEADACHE TYPE: ICD-10-CM

## 2020-07-06 DIAGNOSIS — G89.29 CHRONIC NONINTRACTABLE HEADACHE, UNSPECIFIED HEADACHE TYPE: ICD-10-CM

## 2020-07-06 PROCEDURE — 70551 MRI BRAIN STEM W/O DYE: CPT

## 2020-07-07 ENCOUNTER — TELEPHONE (OUTPATIENT)
Dept: FAMILY MEDICINE CLINIC | Facility: CLINIC | Age: 50
End: 2020-07-07

## 2020-07-07 DIAGNOSIS — R51.9 HEADACHE IN FRONT OF HEAD: Primary | ICD-10-CM

## 2020-07-14 ENCOUNTER — PATIENT MESSAGE (OUTPATIENT)
Dept: FAMILY MEDICINE CLINIC | Facility: CLINIC | Age: 50
End: 2020-07-14

## 2020-07-16 ENCOUNTER — TELEPHONE (OUTPATIENT)
Dept: NEUROLOGY | Facility: CLINIC | Age: 50
End: 2020-07-16

## 2020-07-16 RX ORDER — DULOXETIN HYDROCHLORIDE 60 MG/1
60 CAPSULE, DELAYED RELEASE ORAL 2 TIMES DAILY
Qty: 60 CAPSULE | Refills: 5 | Status: SHIPPED | OUTPATIENT
Start: 2020-07-16 | End: 2020-12-26 | Stop reason: SDUPTHER

## 2020-07-16 NOTE — TELEPHONE ENCOUNTER
Unable to warm transfer patient to the office.   Patient called 4 times in the last 7 days and has not gotten a response.  Patient would like a call back.    Callback: [phone]     Please advise

## 2020-07-16 NOTE — TELEPHONE ENCOUNTER
PT CALLED IN REQUESTING A SOONER APPT THAN THE ONE SHE HAS SCHEDULED ON 9/1/20. SHE SAID SHE IS EXPERIENCING NUMBNESS AND TINGLING ON BOTH SIDES OF HER BODY. SHE SAID HER TONGUE WILL SOMETIMES GO NUMB MAKING SPEECH DIFFICULT AT TIMES. SHE SAID IT'S BEEN GOING ON FOR THE PAST COUPLE OF MONTHS BUT SHE FEELS LIKE IT'S GOTTEN WORSE OVER THE PAST COUPLE OF WEEKS. PLEASE ADVISE. PT SAID SHE WOULD LIKE A CALL FROM THE OFFICE REGARDLESS IF SHE IS ABLE TO BE SEEN SOONER OR NOT.        CALL BACK- [phone]

## 2020-08-06 ENCOUNTER — HOSPITAL ENCOUNTER (OUTPATIENT)
Dept: NEUROLOGY | Facility: HOSPITAL | Age: 50
Discharge: HOME | End: 2020-08-06
Admitting: PHYSICIAN ASSISTANT

## 2020-08-06 DIAGNOSIS — G62.9 NEUROPATHY: ICD-10-CM

## 2020-08-06 PROCEDURE — 95886 MUSC TEST DONE W/N TEST COMP: CPT

## 2020-08-06 PROCEDURE — 95910 NRV CNDJ TEST 7-8 STUDIES: CPT

## 2020-09-01 ENCOUNTER — LAB (OUTPATIENT)
Dept: LAB | Facility: HOSPITAL | Age: 50
End: 2020-09-01

## 2020-09-01 ENCOUNTER — OFFICE VISIT (OUTPATIENT)
Dept: NEUROLOGY | Facility: CLINIC | Age: 50
End: 2020-09-01

## 2020-09-01 ENCOUNTER — TELEPHONE (OUTPATIENT)
Dept: NEUROLOGY | Facility: CLINIC | Age: 50
End: 2020-09-01

## 2020-09-01 ENCOUNTER — PATIENT MESSAGE (OUTPATIENT)
Dept: NEUROLOGY | Facility: CLINIC | Age: 50
End: 2020-09-01

## 2020-09-01 VITALS
BODY MASS INDEX: 32.78 KG/M2 | OXYGEN SATURATION: 98 % | WEIGHT: 204 LBS | DIASTOLIC BLOOD PRESSURE: 80 MMHG | TEMPERATURE: 97.3 F | RESPIRATION RATE: 20 BRPM | HEART RATE: 72 BPM | SYSTOLIC BLOOD PRESSURE: 122 MMHG | HEIGHT: 66 IN

## 2020-09-01 DIAGNOSIS — R20.0 NUMBNESS AND TINGLING: Primary | ICD-10-CM

## 2020-09-01 DIAGNOSIS — G44.89 OTHER HEADACHE SYNDROME: ICD-10-CM

## 2020-09-01 DIAGNOSIS — R20.2 NUMBNESS AND TINGLING: Primary | ICD-10-CM

## 2020-09-01 LAB
CA-I BLD-MCNC: 5.1 MG/DL (ref 4.6–5.4)
CA-I SERPL ISE-MCNC: 1.27 MMOL/L (ref 1.15–1.35)

## 2020-09-01 PROCEDURE — 82746 ASSAY OF FOLIC ACID SERUM: CPT | Performed by: PHYSICIAN ASSISTANT

## 2020-09-01 PROCEDURE — 84165 PROTEIN E-PHORESIS SERUM: CPT | Performed by: PHYSICIAN ASSISTANT

## 2020-09-01 PROCEDURE — 82330 ASSAY OF CALCIUM: CPT | Performed by: PHYSICIAN ASSISTANT

## 2020-09-01 PROCEDURE — 84466 ASSAY OF TRANSFERRIN: CPT | Performed by: PHYSICIAN ASSISTANT

## 2020-09-01 PROCEDURE — 82607 VITAMIN B-12: CPT | Performed by: PHYSICIAN ASSISTANT

## 2020-09-01 PROCEDURE — 83540 ASSAY OF IRON: CPT | Performed by: PHYSICIAN ASSISTANT

## 2020-09-01 PROCEDURE — 84207 ASSAY OF VITAMIN B-6: CPT | Performed by: PHYSICIAN ASSISTANT

## 2020-09-01 PROCEDURE — 99215 OFFICE O/P EST HI 40 MIN: CPT | Performed by: PHYSICIAN ASSISTANT

## 2020-09-01 PROCEDURE — 84155 ASSAY OF PROTEIN SERUM: CPT | Performed by: PHYSICIAN ASSISTANT

## 2020-09-01 PROCEDURE — 83735 ASSAY OF MAGNESIUM: CPT | Performed by: PHYSICIAN ASSISTANT

## 2020-09-01 PROCEDURE — 36415 COLL VENOUS BLD VENIPUNCTURE: CPT | Performed by: PHYSICIAN ASSISTANT

## 2020-09-01 PROCEDURE — 84443 ASSAY THYROID STIM HORMONE: CPT | Performed by: PHYSICIAN ASSISTANT

## 2020-09-01 RX ORDER — PROPRANOLOL HYDROCHLORIDE 80 MG/1
80 CAPSULE, EXTENDED RELEASE ORAL DAILY
Qty: 30 CAPSULE | Refills: 11 | Status: SHIPPED | OUTPATIENT
Start: 2020-09-01 | End: 2021-07-29 | Stop reason: SDUPTHER

## 2020-09-01 NOTE — TELEPHONE ENCOUNTER
----- Message from Eugenia Naik sent at 9/1/2020  3:19 PM EDT -----  Regarding: Non-Urgent Medical Question  Contact: [phone]  I was wondering if we should do an X-ray of my hands and wrists?

## 2020-09-01 NOTE — PROGRESS NOTES
"Subjective       Chief Complaint: headaches      History of Present Illness   Eugenia Naik is a 50 y.o. female who comes to clinic today for evaluation of headaches. She initially noted symptoms since at least 2020 marked by a sharp bilateral ear pain which radiates to her eyes bilaterally. This has worsened over time. She typically notes headaches 3-4 times a week, lasting up to several hours. There is associated nausea as well as light and sound sensitivity. Her symptoms are worse with increased stress and changes in weather.     She has also noted burning, numbness, and tingling in several joints, including her wrists and knees bilaterally. This has worsened over time and is now nearly constant. She reports decreased  strength in both hands (r>l) and intermittent cramping in her hands.     Prior evaluation has included an MRI of the brain which showed mild to moderate increased signal findings in the periventricular and deep white matter. An EMG was unremarkable. She is currently taking propranolol and Cymbalta 60mg BID, which have been significantly beneficial.         I have reviewed and confirmed the past family, social and medical history as accurate on 9/1/2020.     Review of Systems   Constitutional: Negative.    Eyes: Negative.    Respiratory: Negative.    Cardiovascular: Negative.    Gastrointestinal: Negative.    Endocrine: Negative.    Genitourinary: Negative.    Musculoskeletal: Positive for arthralgias, back pain, joint swelling, myalgias and neck pain.   Skin: Negative.    Allergic/Immunologic: Negative.    Neurological: Positive for headaches.   Hematological: Negative.    Psychiatric/Behavioral: Negative.        Objective     /80   Pulse 72   Temp 97.3 °F (36.3 °C) (Temporal)   Resp 20   Ht 167.6 cm (65.98\")   Wt 92.5 kg (204 lb)   LMP 04/07/2018 Comment: LAST MAMMOGRAM 6/2018  SpO2 98%   BMI 32.95 kg/m²     General appearance today is normal.       Physical Exam "   Neurological: She has normal strength. She has a normal Finger-Nose-Finger Test and a normal Heel to Shin Test. Gait normal.   Reflex Scores:       Bicep reflexes are 2+ on the right side and 2+ on the left side.       Brachioradialis reflexes are 2+ on the right side and 2+ on the left side.       Patellar reflexes are 2+ on the right side and 2+ on the left side.  Psychiatric: Her speech is normal.        Neurologic Exam     Mental Status   Speech: speech is normal   Level of consciousness: alert  Normal comprehension.     Cranial Nerves   Cranial nerves II through XII intact.     Motor Exam   Muscle bulk: normal  Overall muscle tone: normal    Strength   Strength 5/5 throughout.     Sensory Exam   Light touch normal.     Gait, Coordination, and Reflexes     Gait  Gait: normal    Coordination   Finger to nose coordination: normal  Heel to shin coordination: normal    Tremor   Resting tremor: absent    Reflexes   Right brachioradialis: 2+  Left brachioradialis: 2+  Right biceps: 2+  Left biceps: 2+  Right patellar: 2+  Left patellar: 2+        Assessment/Plan   Eugenia was seen today for establish care and peripheral neuropathy.    Diagnoses and all orders for this visit:    Numbness and tingling  -     Vitamin B12  -     TSH  -     Folate  -     Protein Elec + Interp, Serum  -     Vitamin B6  -     Magnesium  -     Iron Profile  -     Calcium, Ionized  -     MRI Brain With & Without Contrast    Other headache syndrome  -     MRI Brain With & Without Contrast    Other orders  -     propranolol LA (Inderal LA) 80 MG 24 hr capsule; Take 1 capsule by mouth Daily.          Discussion/Summary   Eugenia Naik comes to clinic today for evaluation of several issues, including headaches, joint pain, numbness and tingling. The etiology of her symptoms is unclear. This was discussed in detail.  It was elected to obtain screening blood work  and an MRI of the brain with and without contrast given her recent MRI. After  discussing potential treatment options, it was elected to increase her propranolol to 80mg daily and continue on Cymbalta unchanged. Next, we may consider a lower dose of GBP. If her diagnostic tests are unremarkable, we will consider referral to 's neuromuscular clinic. She will then follow up in our clinic based on the results of her diagnostic testing.  I spent 45 minutes face to face with the patient and family with 30 minutes spent on discussing diagnosis, prognosis, diagnostic testing, evaluation, current status, treatment options and management as discussed above.       As part of this visit I reviewed radiology results and reviewed radiology images.      Andra Em PA-C

## 2020-09-01 NOTE — TELEPHONE ENCOUNTER
Unfortunately, that would be out of my expertise. I would recommend brining it up to her PCP. Thanks!

## 2020-09-02 LAB
FOLATE SERPL-MCNC: 10.7 NG/ML (ref 4.78–24.2)
IRON 24H UR-MRATE: 67 MCG/DL (ref 37–145)
IRON SATN MFR SERPL: 17 % (ref 20–50)
MAGNESIUM SERPL-MCNC: 2.3 MG/DL (ref 1.6–2.6)
TIBC SERPL-MCNC: 399 MCG/DL (ref 298–536)
TRANSFERRIN SERPL-MCNC: 268 MG/DL (ref 200–360)
TSH SERPL DL<=0.05 MIU/L-ACNC: 1.8 UIU/ML (ref 0.27–4.2)
VIT B12 BLD-MCNC: 412 PG/ML (ref 211–946)

## 2020-09-03 LAB
ALBUMIN SERPL-MCNC: 3.9 G/DL (ref 2.9–4.4)
ALBUMIN/GLOB SERPL: 1.7 {RATIO} (ref 0.7–1.7)
ALPHA1 GLOB FLD ELPH-MCNC: 0.2 G/DL (ref 0–0.4)
ALPHA2 GLOB SERPL ELPH-MCNC: 0.6 G/DL (ref 0.4–1)
B-GLOBULIN SERPL ELPH-MCNC: 0.9 G/DL (ref 0.7–1.3)
GAMMA GLOB SERPL ELPH-MCNC: 0.6 G/DL (ref 0.4–1.8)
GLOBULIN SER CALC-MCNC: 2.3 G/DL (ref 2.2–3.9)
Lab: NORMAL
M-SPIKE: NORMAL G/DL
PROT PATTERN SERPL ELPH-IMP: NORMAL
PROT SERPL-MCNC: 6.2 G/DL (ref 6–8.5)

## 2020-09-05 LAB — VIT B6 SERPL-MCNC: 15.8 UG/L (ref 2–32.8)

## 2020-09-08 ENCOUNTER — TELEPHONE (OUTPATIENT)
Dept: NEUROLOGY | Facility: CLINIC | Age: 50
End: 2020-09-08

## 2020-09-08 NOTE — TELEPHONE ENCOUNTER
----- Message from Eugenia Naik sent at 9/5/2020  6:20 PM EDT -----  Regarding: Visit Follow-Up Question  Contact: [phone]    We discussed doing and mri of my neck to rule out my issues there despite the emg result. I noticed that just the Brain mri was ordered. I'm really struggling to figure out what is going on with my wrist, hands and elbows   Please advise or order mri of neck while doing brain please

## 2020-09-09 DIAGNOSIS — G44.89 OTHER HEADACHE SYNDROME: ICD-10-CM

## 2020-09-09 DIAGNOSIS — R20.0 NUMBNESS AND TINGLING: Primary | ICD-10-CM

## 2020-09-09 DIAGNOSIS — R20.2 NUMBNESS AND TINGLING: Primary | ICD-10-CM

## 2020-09-09 DIAGNOSIS — G62.9 NEUROPATHY: ICD-10-CM

## 2020-09-10 ENCOUNTER — HOSPITAL ENCOUNTER (OUTPATIENT)
Dept: GENERAL RADIOLOGY | Facility: HOSPITAL | Age: 50
Discharge: HOME | End: 2020-09-10
Admitting: PHYSICIAN ASSISTANT

## 2020-09-10 ENCOUNTER — TELEPHONE (OUTPATIENT)
Dept: FAMILY MEDICINE CLINIC | Facility: CLINIC | Age: 50
End: 2020-09-10

## 2020-09-10 DIAGNOSIS — M79.602 LEFT ARM PAIN: ICD-10-CM

## 2020-09-10 DIAGNOSIS — M79.601 PAIN OF RIGHT UPPER EXTREMITY: ICD-10-CM

## 2020-09-10 DIAGNOSIS — M79.601 PAIN OF RIGHT UPPER EXTREMITY: Primary | ICD-10-CM

## 2020-09-10 PROCEDURE — 73090 X-RAY EXAM OF FOREARM: CPT

## 2020-09-10 PROCEDURE — 73130 X-RAY EXAM OF HAND: CPT

## 2020-09-10 NOTE — TELEPHONE ENCOUNTER
HUB CALLED, STATED PT. @ [facility] DIA [facility]. AND ORDERS FOR IMAGING ARE NOT IN SYSTEM:  XRAYS FOR HANDS, WRIST & ELBOW'S.    SPOKE W/SHIRAZ @ [facility], WANTING TO BE REACHED BACK @ #: [phone].    PT. IS AWAITING @ [facility]  RIGHT NOW.

## 2020-09-16 DIAGNOSIS — Z00.00 ANNUAL PHYSICAL EXAM: ICD-10-CM

## 2020-09-16 RX ORDER — ATORVASTATIN CALCIUM 10 MG/1
10 TABLET, FILM COATED ORAL DAILY
Qty: 90 TABLET | Refills: 3 | Status: SHIPPED | OUTPATIENT
Start: 2020-09-16 | End: 2021-10-26 | Stop reason: SDUPTHER

## 2020-09-24 ENCOUNTER — HOSPITAL ENCOUNTER (OUTPATIENT)
Dept: MRI IMAGING | Facility: HOSPITAL | Age: 50
Discharge: HOME | End: 2020-09-24

## 2020-09-24 PROCEDURE — A9577 INJ MULTIHANCE: HCPCS | Performed by: PHYSICIAN ASSISTANT

## 2020-09-24 PROCEDURE — 72141 MRI NECK SPINE W/O DYE: CPT

## 2020-09-24 PROCEDURE — 0 GADOBENATE DIMEGLUMINE 529 MG/ML SOLUTION: Performed by: PHYSICIAN ASSISTANT

## 2020-09-24 PROCEDURE — 70553 MRI BRAIN STEM W/O & W/DYE: CPT

## 2020-09-24 RX ADMIN — GADOBENATE DIMEGLUMINE 17 ML: 529 INJECTION, SOLUTION INTRAVENOUS at 08:18

## 2020-09-25 ENCOUNTER — TELEPHONE (OUTPATIENT)
Dept: NEUROLOGY | Facility: CLINIC | Age: 50
End: 2020-09-25

## 2020-09-25 NOTE — TELEPHONE ENCOUNTER
PT CALLED IN TODAY INQUIRING ABOUT RESULTS FROM HER MRI OF NECK SHE HAD DONE ON 9/24/20. PLEASE ADVISE WITH RESULTS AND CALL PT      CALL BACK- [phone]

## 2020-09-28 NOTE — TELEPHONE ENCOUNTER
PT CALLED RETURNING [facility] CALL. SPOKE WITH MEGHAN WHO SAID [facility] WAS AWAY FROM HER DESK. PT SAID IT'S OK TO CALL BACK OR LEAVE A MESSAGE IN [facility]      CALL BACK- [phone]

## 2020-09-29 ENCOUNTER — TELEPHONE (OUTPATIENT)
Dept: NEUROLOGY | Facility: CLINIC | Age: 50
End: 2020-09-29

## 2020-09-29 DIAGNOSIS — G62.9 NEUROPATHY: Primary | ICD-10-CM

## 2020-09-29 RX ORDER — GABAPENTIN 300 MG/1
300 CAPSULE ORAL 3 TIMES DAILY
Qty: 90 CAPSULE | Refills: 2 | Status: SHIPPED | OUTPATIENT
Start: 2020-09-29 | End: 2020-12-26 | Stop reason: SDUPTHER

## 2020-09-29 NOTE — TELEPHONE ENCOUNTER
I am really sorry for the frustration. I had offered gabapentin for the pain, though I am not sure where the breakdown in communication happened, very sorry. I have forwarded the MRI results with options on potential next steps to take. We will also send the GBP in today. Thanks.

## 2020-09-29 NOTE — TELEPHONE ENCOUNTER
----- Message from Eugenia Naik sent at 9/29/2020  9:52 AM EDT -----  Regarding: RE: Test Results Question  Contact: [phone]  My physical therapist actually assessed my neck and said it is the neck that is causing the wrist hand and elbow problems. I felt like my issue was totally dismissed by and mri.  My pain issue wasn't addressed after I said I wanted something to help, nor did y'all suggest anything to help. I feel you just assumed I was nuts and I have fibromyalgia. I was really dissatisfied with the brush off. Especially when she said she   wouldn't.   I do not seek drugs nor am. I crazy. I still go to work every day despite the pain. I am not seeking fmla or disability just assistance support    ----- Message -----  From: GIOVANNI ACEVES  Sent: 9/29/20, 9:46 AM  To: Eugenia Naik  Subject: RE: Test Results Question    I wanted to follow up to your message and let you know if you need anything please let us now.  Thank you      ----- Message -----       From:Eugenia Naik       Sent:9/24/2020  6:36 PM EDT         To:Andra Em PA-C    Subject:Test Results Question    No significant abnormalities with bulging disc and me having symptoms in my arms hands and wrists. If I put two and two together I would say I might have a pinched nerve in my neck. That is why I am having so much pain. Some suggestions with that might get be appreciated. I would like to function again at a normal level.  I feel like the connection between symptoms and the mri are not being made. Which is particularly scary for me even though you see patients with worse symptoms all the time  Mine are affecting my day to day life. I cannot do much with my right hand without getting pain and electrical jolts. I cannot clean or work without paying for it with increased pain   I'm sorry if I am rude but I am feeling this. Help me or give me some body who will. Please try to respond promptly as I am still waiting for a reply from  my last question  Sincerely   Eugenia

## 2020-09-30 ENCOUNTER — OFFICE VISIT (OUTPATIENT)
Dept: FAMILY MEDICINE CLINIC | Facility: CLINIC | Age: 50
End: 2020-09-30

## 2020-09-30 VITALS
RESPIRATION RATE: 14 BRPM | WEIGHT: 196 LBS | HEIGHT: 66 IN | TEMPERATURE: 97.5 F | BODY MASS INDEX: 31.5 KG/M2 | SYSTOLIC BLOOD PRESSURE: 124 MMHG | OXYGEN SATURATION: 99 % | HEART RATE: 75 BPM | DIASTOLIC BLOOD PRESSURE: 82 MMHG

## 2020-09-30 DIAGNOSIS — M54.2 NECK PAIN: Primary | ICD-10-CM

## 2020-09-30 DIAGNOSIS — R93.7 ABNORMAL MRI, CERVICAL SPINE: ICD-10-CM

## 2020-09-30 DIAGNOSIS — R20.2 NUMBNESS AND TINGLING: Primary | ICD-10-CM

## 2020-09-30 DIAGNOSIS — R20.0 NUMBNESS AND TINGLING: Primary | ICD-10-CM

## 2020-09-30 PROCEDURE — 99213 OFFICE O/P EST LOW 20 MIN: CPT | Performed by: PHYSICIAN ASSISTANT

## 2020-09-30 NOTE — PROGRESS NOTES
Subjective   Eugenia Naik is a 50 y.o. female  Fibromyalgia (F/U)  Neck pain    History of Present Illness  Patient is a pleasant 50-year-old white female who comes in for follow-up of fibromyalgia, neck pain she is been seeing neurology she states that her arm pain has gotten some better while doing physical therapy she would like another referral patient states she has pain with flexion extension of her neck she had MRIs of the head and C-spine patient's pain is worse in the mornings but is better through the day neck pain is still not controlled  The following portions of the patient's history were reviewed and updated as appropriate: allergies, current medications, past social history and problem list    Review of Systems   Constitutional: Negative for appetite change, diaphoresis, fatigue and unexpected weight change.   Eyes: Negative for visual disturbance.   Respiratory: Negative for cough, chest tightness and shortness of breath.    Cardiovascular: Negative for chest pain, palpitations and leg swelling.   Gastrointestinal: Negative for diarrhea, nausea and vomiting.   Endocrine: Negative for polydipsia, polyphagia and polyuria.   Musculoskeletal: Positive for myalgias and neck pain.   Skin: Negative for color change and rash.   Neurological: Negative for dizziness, syncope, weakness, light-headedness, numbness and headaches.       Objective     Vitals:    09/30/20 1606   BP: 124/82   Pulse: 75   Resp: 14   Temp: 97.5 °F (36.4 °C)   SpO2: 99%       Physical Exam  Vitals signs and nursing note reviewed.   Constitutional:       Appearance: She is well-developed. She is not diaphoretic.   Neck:      Musculoskeletal: Neck supple.      Thyroid: No thyromegaly.      Vascular: No JVD.   Cardiovascular:      Rate and Rhythm: Normal rate and regular rhythm.      Pulses: Normal pulses.      Heart sounds: Normal heart sounds. No murmur.   Pulmonary:      Effort: Pulmonary effort is normal. No respiratory  distress.      Breath sounds: Normal breath sounds.   Abdominal:      General: Bowel sounds are normal.      Palpations: Abdomen is soft.      Tenderness: There is no abdominal tenderness.   Lymphadenopathy:      Cervical: No cervical adenopathy.   Skin:     General: Skin is warm and dry.   Neurological:      Sensory: No sensory deficit.         Assessment/Plan     Eugenia was seen today for fibromyalgia.    Diagnoses and all orders for this visit:    Neck pain  -     Ambulatory Referral to Physical Therapy Evaluate and treat    Continue range of motion exercises for C-spine follow-up as needed

## 2020-10-02 ENCOUNTER — TELEPHONE (OUTPATIENT)
Dept: NEUROLOGY | Facility: CLINIC | Age: 50
End: 2020-10-02

## 2020-10-02 NOTE — TELEPHONE ENCOUNTER
This is from [facility]    WE CAN NOT SCHEDULED MRI WITH ONLY. THIS WILL NEED TO BE REORDERED ARE A MRI CERVICAL SPINE WITH AND WITHOUT OR WITHOUT ONLY

## 2020-10-05 DIAGNOSIS — R93.7 ABNORMAL MRI, CERVICAL SPINE: Primary | ICD-10-CM

## 2020-10-05 DIAGNOSIS — R20.0 NUMBNESS AND TINGLING: ICD-10-CM

## 2020-10-05 DIAGNOSIS — R20.2 NUMBNESS AND TINGLING: ICD-10-CM

## 2020-10-19 ENCOUNTER — HOSPITAL ENCOUNTER (OUTPATIENT)
Dept: MRI IMAGING | Facility: HOSPITAL | Age: 50
Discharge: HOME | End: 2020-10-19
Admitting: PHYSICIAN ASSISTANT

## 2020-10-19 DIAGNOSIS — R93.7 ABNORMAL MRI, CERVICAL SPINE: ICD-10-CM

## 2020-10-19 DIAGNOSIS — R20.2 NUMBNESS AND TINGLING: ICD-10-CM

## 2020-10-19 DIAGNOSIS — R20.0 NUMBNESS AND TINGLING: ICD-10-CM

## 2020-10-19 PROCEDURE — A9577 INJ MULTIHANCE: HCPCS | Performed by: PHYSICIAN ASSISTANT

## 2020-10-19 PROCEDURE — 72156 MRI NECK SPINE W/O & W/DYE: CPT

## 2020-10-19 PROCEDURE — 0 GADOBENATE DIMEGLUMINE 529 MG/ML SOLUTION: Performed by: PHYSICIAN ASSISTANT

## 2020-10-19 RX ADMIN — GADOBENATE DIMEGLUMINE 18 ML: 529 INJECTION, SOLUTION INTRAVENOUS at 18:05

## 2020-10-22 ENCOUNTER — OFFICE VISIT (OUTPATIENT)
Dept: FAMILY MEDICINE CLINIC | Facility: CLINIC | Age: 50
End: 2020-10-22

## 2020-10-22 VITALS
HEIGHT: 66 IN | BODY MASS INDEX: 31.98 KG/M2 | OXYGEN SATURATION: 99 % | HEART RATE: 65 BPM | SYSTOLIC BLOOD PRESSURE: 120 MMHG | RESPIRATION RATE: 14 BRPM | WEIGHT: 199 LBS | TEMPERATURE: 97.7 F | DIASTOLIC BLOOD PRESSURE: 78 MMHG

## 2020-10-22 DIAGNOSIS — Z00.00 ROUTINE GENERAL MEDICAL EXAMINATION AT A HEALTH CARE FACILITY: Primary | ICD-10-CM

## 2020-10-22 LAB
BILIRUB BLD-MCNC: NEGATIVE MG/DL
CLARITY, POC: CLEAR
COLOR UR: YELLOW
GLUCOSE UR STRIP-MCNC: NEGATIVE MG/DL
KETONES UR QL: NEGATIVE
LEUKOCYTE EST, POC: ABNORMAL
NITRITE UR-MCNC: NEGATIVE MG/ML
PH UR: 7 [PH] (ref 5–8)
PROT UR STRIP-MCNC: NEGATIVE MG/DL
RBC # UR STRIP: NEGATIVE /UL
SP GR UR: 1.01 (ref 1–1.03)
UROBILINOGEN UR QL: NORMAL

## 2020-10-22 PROCEDURE — 99396 PREV VISIT EST AGE 40-64: CPT | Performed by: PHYSICIAN ASSISTANT

## 2020-10-22 PROCEDURE — 81003 URINALYSIS AUTO W/O SCOPE: CPT | Performed by: PHYSICIAN ASSISTANT

## 2020-10-22 PROCEDURE — 93000 ELECTROCARDIOGRAM COMPLETE: CPT | Performed by: PHYSICIAN ASSISTANT

## 2020-10-22 NOTE — PROGRESS NOTES
Subjective   Eugenia Naik is a 50 y.o. female  Annual Exam (UTD on MMG, colonoscopy, vaccines. )      History of Present Illness  Patient is a pleasant 50-year-old white female who presents for preventive medical examination denies any problems or complaints  The following portions of the patient's history were reviewed and updated as appropriate: allergies, current medications, past social history and problem list    Review of Systems   Constitutional: Negative.    HENT: Negative.    Eyes: Negative.    Respiratory: Negative.    Cardiovascular: Negative.    Gastrointestinal: Negative.    Endocrine: Negative.    Genitourinary: Negative.    Musculoskeletal: Negative.    Skin: Negative.    Allergic/Immunologic: Negative.    Neurological: Negative.    Hematological: Negative.    Psychiatric/Behavioral: Negative.    All other systems reviewed and are negative.      Objective     Vitals:    10/22/20 1000   BP: 120/78   Pulse: 65   Resp: 14   Temp: 97.7 °F (36.5 °C)   SpO2: 99%       Physical Exam  Vitals signs and nursing note reviewed.   Constitutional:       Appearance: She is well-developed. She is not diaphoretic.   Neck:      Musculoskeletal: Neck supple.      Thyroid: No thyromegaly.      Vascular: No JVD.   Cardiovascular:      Rate and Rhythm: Normal rate and regular rhythm.      Pulses: Normal pulses.      Heart sounds: Normal heart sounds. No murmur.   Pulmonary:      Effort: Pulmonary effort is normal. No respiratory distress.      Breath sounds: Normal breath sounds.   Abdominal:      General: Bowel sounds are normal.      Palpations: Abdomen is soft.      Tenderness: There is no abdominal tenderness.   Lymphadenopathy:      Cervical: No cervical adenopathy.   Skin:     General: Skin is warm and dry.   Neurological:      Sensory: No sensory deficit.       ECG 12 Lead    Date/Time: 10/22/2020 5:19 PM  Performed by: Jeff Montesinos PA  Authorized by: Jeff Montesinos PA   Rhythm: sinus  rhythm  Rate: normal  ST Segments: ST segments normal  T Waves: T waves normal  QRS axis: normal    Clinical impression: normal ECG            Assessment/Plan     Diagnoses and all orders for this visit:    1. Routine general medical examination at a health care facility (Primary)  -     POCT urinalysis dipstick, automated  -     Lipid Panel; Future  -     Hepatitis C Antibody; Future    Preventive medicine discussed, diet, exercise, healthy living discussed importance of losing weight exercise

## 2020-10-26 ENCOUNTER — LAB (OUTPATIENT)
Dept: LAB | Facility: HOSPITAL | Age: 50
End: 2020-10-26

## 2020-10-26 DIAGNOSIS — Z00.00 ROUTINE GENERAL MEDICAL EXAMINATION AT A HEALTH CARE FACILITY: ICD-10-CM

## 2020-10-26 LAB
CHOLEST SERPL-MCNC: 206 MG/DL (ref 0–200)
HCV AB SER DONR QL: NORMAL
HDLC SERPL-MCNC: 52 MG/DL (ref 40–60)
LDLC SERPL CALC-MCNC: 128 MG/DL (ref 0–100)
LDLC/HDLC SERPL: 2.4 {RATIO}
TRIGL SERPL-MCNC: 147 MG/DL (ref 0–150)
VLDLC SERPL-MCNC: 26 MG/DL (ref 5–40)

## 2020-10-26 PROCEDURE — 36415 COLL VENOUS BLD VENIPUNCTURE: CPT

## 2020-10-26 PROCEDURE — 86803 HEPATITIS C AB TEST: CPT

## 2020-10-26 PROCEDURE — 80061 LIPID PANEL: CPT

## 2020-12-17 ENCOUNTER — IMMUNIZATION (OUTPATIENT)
Dept: VACCINE CLINIC | Facility: HOSPITAL | Age: 50
End: 2020-12-17

## 2020-12-17 PROCEDURE — 0001A: CPT | Performed by: INTERNAL MEDICINE

## 2020-12-17 PROCEDURE — 91300 HC SARSCOV02 VAC 30MCG/0.3ML IM: CPT | Performed by: INTERNAL MEDICINE

## 2020-12-26 DIAGNOSIS — G62.9 NEUROPATHY: Primary | ICD-10-CM

## 2020-12-26 DIAGNOSIS — G62.9 NEUROPATHY: ICD-10-CM

## 2020-12-28 RX ORDER — DULOXETIN HYDROCHLORIDE 60 MG/1
60 CAPSULE, DELAYED RELEASE ORAL 2 TIMES DAILY
Qty: 60 CAPSULE | Refills: 5 | Status: SHIPPED | OUTPATIENT
Start: 2020-12-28 | End: 2021-01-08

## 2020-12-28 RX ORDER — GABAPENTIN 300 MG/1
300 CAPSULE ORAL 3 TIMES DAILY
Qty: 90 CAPSULE | Refills: 2 | Status: SHIPPED | OUTPATIENT
Start: 2020-12-28 | End: 2021-03-25

## 2020-12-31 DIAGNOSIS — F41.9 ANXIETY: Primary | ICD-10-CM

## 2020-12-31 RX ORDER — ALPRAZOLAM 1 MG/1
1 TABLET ORAL 2 TIMES DAILY
Qty: 60 TABLET | Refills: 3 | Status: SHIPPED | OUTPATIENT
Start: 2020-12-31 | End: 2021-05-17 | Stop reason: SDUPTHER

## 2020-12-31 RX ORDER — ALPRAZOLAM 1 MG/1
1 TABLET ORAL 2 TIMES DAILY
Qty: 60 TABLET | Refills: 3 | Status: CANCELLED | OUTPATIENT
Start: 2020-12-31

## 2021-01-06 ENCOUNTER — IMMUNIZATION (OUTPATIENT)
Dept: VACCINE CLINIC | Facility: HOSPITAL | Age: 51
End: 2021-01-06

## 2021-01-06 PROCEDURE — 0001A: CPT | Performed by: INTERNAL MEDICINE

## 2021-01-06 PROCEDURE — 0002A: CPT | Performed by: INTERNAL MEDICINE

## 2021-01-06 PROCEDURE — 91300 HC SARSCOV02 VAC 30MCG/0.3ML IM: CPT | Performed by: INTERNAL MEDICINE

## 2021-01-07 ENCOUNTER — APPOINTMENT (OUTPATIENT)
Dept: VACCINE CLINIC | Facility: HOSPITAL | Age: 51
End: 2021-01-07

## 2021-01-08 DIAGNOSIS — G62.9 NEUROPATHY: ICD-10-CM

## 2021-01-08 RX ORDER — DULOXETIN HYDROCHLORIDE 60 MG/1
CAPSULE, DELAYED RELEASE ORAL
Qty: 60 CAPSULE | Refills: 4 | Status: SHIPPED | OUTPATIENT
Start: 2021-01-08 | End: 2021-01-24 | Stop reason: SDUPTHER

## 2021-01-24 DIAGNOSIS — G62.9 NEUROPATHY: ICD-10-CM

## 2021-01-25 RX ORDER — DULOXETIN HYDROCHLORIDE 60 MG/1
60 CAPSULE, DELAYED RELEASE ORAL 2 TIMES DAILY
Qty: 60 CAPSULE | Refills: 4 | Status: SHIPPED | OUTPATIENT
Start: 2021-01-25 | End: 2021-03-11

## 2021-03-10 ENCOUNTER — PATIENT MESSAGE (OUTPATIENT)
Dept: FAMILY MEDICINE CLINIC | Facility: CLINIC | Age: 51
End: 2021-03-10

## 2021-03-11 ENCOUNTER — TELEPHONE (OUTPATIENT)
Dept: FAMILY MEDICINE CLINIC | Facility: CLINIC | Age: 51
End: 2021-03-11

## 2021-03-11 DIAGNOSIS — G62.9 NEUROPATHY: ICD-10-CM

## 2021-03-11 NOTE — TELEPHONE ENCOUNTER
----- Message from Eugenia Naik sent at 3/10/2021  6:29 PM EST -----  Regarding: Visit Follow-Up Question  Contact: [phone]  Ady  I am totally stubborn and have finally accepted that I have fibromyalgia.  I am struggling with my feet and knees now. What is next. I cannot tolerate gabapentin and doesn't seem to work. I have more tendon pain and muscle tightness.    Thanks   Eugenia

## 2021-03-12 RX ORDER — SCOLOPAMINE TRANSDERMAL SYSTEM 1 MG/1
1 PATCH, EXTENDED RELEASE TRANSDERMAL
Qty: 10 PATCH | Refills: 1 | Status: SHIPPED | OUTPATIENT
Start: 2021-03-12 | End: 2021-05-07

## 2021-03-19 ENCOUNTER — TELEPHONE (OUTPATIENT)
Dept: FAMILY MEDICINE CLINIC | Facility: CLINIC | Age: 51
End: 2021-03-19

## 2021-03-19 NOTE — TELEPHONE ENCOUNTER
Caller: Eugenia Naik    Relationship: Self    Best call back number: [phone]       Who are you requesting to speak with (clinical staff, provider,  specific staff member): BEVERLEY CHOPRA OR HIS NURSE.    What was the call regarding: PATIENT STATED THAT SHE SENT A MESSAGE BACK ABOUT A MEDICATIONS WITHDRAWAL SHE WAS HAVING.  PATIENT WANTED TO SPEAK WITH SOMEONE.     Do you require a callback: YES

## 2021-03-25 ENCOUNTER — OFFICE VISIT (OUTPATIENT)
Dept: FAMILY MEDICINE CLINIC | Facility: CLINIC | Age: 51
End: 2021-03-25

## 2021-03-25 VITALS
HEIGHT: 66 IN | BODY MASS INDEX: 32.37 KG/M2 | DIASTOLIC BLOOD PRESSURE: 80 MMHG | HEART RATE: 81 BPM | OXYGEN SATURATION: 99 % | SYSTOLIC BLOOD PRESSURE: 126 MMHG | TEMPERATURE: 96.9 F | WEIGHT: 201.4 LBS | RESPIRATION RATE: 16 BRPM

## 2021-03-25 DIAGNOSIS — M72.2 PLANTAR FASCIITIS OF LEFT FOOT: ICD-10-CM

## 2021-03-25 DIAGNOSIS — M79.7 FIBROMYALGIA: Primary | ICD-10-CM

## 2021-03-25 PROCEDURE — 99214 OFFICE O/P EST MOD 30 MIN: CPT | Performed by: PHYSICIAN ASSISTANT

## 2021-03-25 NOTE — PROGRESS NOTES
Subjective   Eugenia Naik is a 50 y.o. female  Fibromyalgia (Still having withdrawal sx's from Cymbalta. Req FMLA paperwork be completed) and Plantar Fasciitis (Lt foot, treating with stretches, heat, ice, inserts)      History of Present Illness  Patient is a very pleasant 50-year-old white female who is very well-known to me she comes in today having some withdrawal symptoms of Cymbalta while switching to Savella for uncontrolled fibromyalgia she has been under extreme amount of stress to some personal issues which is made the process much more difficult she states she has generalized pain fatigue no energy anxiety.  Patient states symptoms are not controlled and not having some withdrawal symptoms    Patient also complains of some plantar fascia in left heel she has been doing stretches taking over-the-counter medication with any relief  The following portions of the patient's history were reviewed and updated as appropriate: allergies, current medications, past social history and problem list    Review of Systems   Constitutional: Positive for fatigue. Negative for unexpected weight change.   HENT: Negative for ear discharge, facial swelling, hearing loss and sneezing.    Respiratory: Negative for cough, chest tightness and shortness of breath.    Cardiovascular: Negative for chest pain, palpitations and leg swelling.   Gastrointestinal: Negative for nausea.   Musculoskeletal: Positive for arthralgias and back pain.        Foot pain bilaterally   Skin: Negative for color change and rash.   Neurological: Negative for dizziness, syncope, weakness and headaches.       Objective     Vitals:    03/25/21 1040   BP: 126/80   Pulse: 81   Resp: 16   Temp: 96.9 °F (36.1 °C)   SpO2: 99%       Physical Exam  Vitals and nursing note reviewed.   Constitutional:       Appearance: She is well-developed. She is not diaphoretic.   Neck:      Thyroid: No thyromegaly.      Vascular: No JVD.   Cardiovascular:      Rate and  Rhythm: Normal rate and regular rhythm.      Pulses: Normal pulses.      Heart sounds: Normal heart sounds. No murmur heard.     Pulmonary:      Effort: Pulmonary effort is normal. No respiratory distress.      Breath sounds: Normal breath sounds.   Abdominal:      General: Bowel sounds are normal.      Palpations: Abdomen is soft.      Tenderness: There is no abdominal tenderness.   Musculoskeletal:      Cervical back: Neck supple.   Lymphadenopathy:      Cervical: No cervical adenopathy.   Skin:     General: Skin is warm and dry.   Neurological:      Sensory: No sensory deficit.         Assessment/Plan     Diagnoses and all orders for this visit:    1. Fibromyalgia (Primary)  -     Milnacipran HCl (Savella) 100 MG tablet; Take 1 tablet by mouth 2 (Two) Times a Day.  Dispense: 60 tablet; Refill: 3    2. Plantar fasciitis of left foot    Range of motion exercise given follow-up for possible injection for next visit    Long discussion with patient about current symptoms we will go ahead and up her Savella to 100 mg twice a day she is to follow-up in 3 weeks, FMLA paperwork filled out patient is to follow-up at that time  Answers for HPI/ROS submitted by the patient on 3/23/2021  Please describe your symptoms.: Fibromyalgia management, pain in left heel  Have you had these symptoms before?: Yes  How long have you been having these symptoms?: Greater than 2 weeks  Please list any medications you are currently taking for this condition.: Ice ibuprofen TENS STRETCHING  Please describe any probable cause for these symptoms. : Unsure  What is the primary reason for your visit?: Other

## 2021-03-31 DIAGNOSIS — Z12.11 SCREENING FOR COLON CANCER: Primary | ICD-10-CM

## 2021-04-05 ENCOUNTER — APPOINTMENT (OUTPATIENT)
Dept: PREADMISSION TESTING | Facility: HOSPITAL | Age: 51
End: 2021-04-05

## 2021-04-05 PROCEDURE — C9803 HOPD COVID-19 SPEC COLLECT: HCPCS

## 2021-04-05 PROCEDURE — U0004 COV-19 TEST NON-CDC HGH THRU: HCPCS

## 2021-04-06 LAB — SARS-COV-2 RNA NOSE QL NAA+PROBE: NOT DETECTED

## 2021-04-07 ENCOUNTER — OFFICE VISIT (OUTPATIENT)
Dept: FAMILY MEDICINE CLINIC | Facility: CLINIC | Age: 51
End: 2021-04-07

## 2021-04-07 VITALS
RESPIRATION RATE: 16 BRPM | HEIGHT: 66 IN | DIASTOLIC BLOOD PRESSURE: 76 MMHG | HEART RATE: 75 BPM | BODY MASS INDEX: 32.4 KG/M2 | SYSTOLIC BLOOD PRESSURE: 118 MMHG | OXYGEN SATURATION: 99 % | WEIGHT: 201.6 LBS

## 2021-04-07 DIAGNOSIS — M79.7 FIBROMYALGIA: Primary | ICD-10-CM

## 2021-04-07 DIAGNOSIS — M79.672 FOOT PAIN, LEFT: ICD-10-CM

## 2021-04-07 DIAGNOSIS — M72.2 PLANTAR FASCIITIS OF LEFT FOOT: ICD-10-CM

## 2021-04-07 PROCEDURE — 20551 NJX 1 TENDON ORIGIN/INSJ: CPT | Performed by: PHYSICIAN ASSISTANT

## 2021-04-07 PROCEDURE — 99213 OFFICE O/P EST LOW 20 MIN: CPT | Performed by: PHYSICIAN ASSISTANT

## 2021-04-07 RX ORDER — METHYLPREDNISOLONE ACETATE 40 MG/ML
40 INJECTION, SUSPENSION INTRA-ARTICULAR; INTRALESIONAL; INTRAMUSCULAR; SOFT TISSUE ONCE
Status: COMPLETED | OUTPATIENT
Start: 2021-04-07 | End: 2021-04-07

## 2021-04-07 RX ADMIN — METHYLPREDNISOLONE ACETATE 40 MG: 40 INJECTION, SUSPENSION INTRA-ARTICULAR; INTRALESIONAL; INTRAMUSCULAR; SOFT TISSUE at 15:16

## 2021-04-07 NOTE — PROGRESS NOTES
I have reviewed the notes, assessments, and/or procedures performed by Ady Montesinos PA-C, I concur with his documentation of Eugenia Naik.

## 2021-04-07 NOTE — PROGRESS NOTES
Subjective   Eugenia Naik is a 50 y.o. female  Fibromyalgia (2 week recheck/Meds)  Left foot pain    History of Present Illness  Patient is a pleasant 50-year-old white female who presents consents for visit today for fibromyalgia she is been taking Savella which is helping symptoms symptoms are improving feeling better pain is improved    Patient continues to complain of left heel foot pain very tender plantar fascia on the left foot has pain with walking  The following portions of the patient's history were reviewed and updated as appropriate: allergies, current medications, past social history and problem list    Review of Systems   Constitutional: Negative.    HENT: Negative.    Eyes: Negative.    Respiratory: Negative.    Cardiovascular: Negative.    Gastrointestinal: Negative.    Endocrine: Negative.    Genitourinary: Negative.    Musculoskeletal: Positive for back pain and myalgias.   Skin: Negative.    Allergic/Immunologic: Negative.    Neurological: Negative.    Hematological: Negative.    Psychiatric/Behavioral: Negative.    All other systems reviewed and are negative.      Objective     Vitals:    04/07/21 1457   BP: 118/76   Pulse: 75   Resp: 16   SpO2: 99%       Physical Exam  Vitals and nursing note reviewed.   Constitutional:       Appearance: She is well-developed. She is not diaphoretic.   Neck:      Thyroid: No thyromegaly.      Vascular: No JVD.   Cardiovascular:      Rate and Rhythm: Normal rate and regular rhythm.      Pulses: Normal pulses.      Heart sounds: Normal heart sounds. No murmur heard.     Pulmonary:      Effort: Pulmonary effort is normal. No respiratory distress.      Breath sounds: Normal breath sounds.   Abdominal:      General: Bowel sounds are normal.      Palpations: Abdomen is soft.      Tenderness: There is no abdominal tenderness.   Musculoskeletal:      Right shoulder: Tenderness present.      Left shoulder: Tenderness present.      Cervical back: Neck supple.         Feet:    Lymphadenopathy:      Cervical: No cervical adenopathy.   Skin:     General: Skin is warm and dry.   Neurological:      Sensory: No sensory deficit.         Assessment/Plan     Diagnoses and all orders for this visit:    1. Fibromyalgia (Primary)    2. Plantar fasciitis of left foot  -     methylPREDNISolone acetate (DEPO-medrol) injection 40 mg    3. Foot pain, left  -     methylPREDNISolone acetate (DEPO-medrol) injection 40 mg       Answers for HPI/ROS submitted by the patient on 3/31/2021  Please describe your symptoms.: Fibromyalgia f/u. Left foot pain  Have you had these symptoms before?: Yes  How long have you been having these symptoms?: Greater than 2 weeks  What is the primary reason for your visit?: Other    #1 continue Savella    2.  Left heel injected continue range of motion exercises    3.  Follow-up in 1 month    Recheck in 3 months if no better

## 2021-04-08 ENCOUNTER — OUTSIDE FACILITY SERVICE (OUTPATIENT)
Dept: GASTROENTEROLOGY | Facility: CLINIC | Age: 51
End: 2021-04-08

## 2021-04-08 PROCEDURE — 88305 TISSUE EXAM BY PATHOLOGIST: CPT | Performed by: INTERNAL MEDICINE

## 2021-04-08 PROCEDURE — 45385 COLONOSCOPY W/LESION REMOVAL: CPT | Performed by: INTERNAL MEDICINE

## 2021-04-09 ENCOUNTER — LAB REQUISITION (OUTPATIENT)
Dept: LAB | Facility: HOSPITAL | Age: 51
End: 2021-04-09

## 2021-04-09 DIAGNOSIS — Z86.010 PERSONAL HISTORY OF COLONIC POLYPS: ICD-10-CM

## 2021-04-12 LAB
CYTO UR: NORMAL
LAB AP CASE REPORT: NORMAL
LAB AP CLINICAL INFORMATION: NORMAL
LAB AP DIAGNOSIS COMMENT: NORMAL
PATH REPORT.FINAL DX SPEC: NORMAL
PATH REPORT.GROSS SPEC: NORMAL

## 2021-04-27 RX ORDER — TIZANIDINE 4 MG/1
4 TABLET ORAL NIGHTLY
Qty: 30 TABLET | Refills: 1 | Status: SHIPPED | OUTPATIENT
Start: 2021-04-27 | End: 2021-06-15 | Stop reason: SDUPTHER

## 2021-05-06 ENCOUNTER — TELEPHONE (OUTPATIENT)
Dept: FAMILY MEDICINE CLINIC | Facility: CLINIC | Age: 51
End: 2021-05-06

## 2021-05-06 NOTE — TELEPHONE ENCOUNTER
Irma Bonilla from [employer] would like to speak with someone regarding the pt, please advise.   angie's direct phone # [phone]

## 2021-05-07 ENCOUNTER — OFFICE VISIT (OUTPATIENT)
Dept: FAMILY MEDICINE CLINIC | Facility: CLINIC | Age: 51
End: 2021-05-07

## 2021-05-07 VITALS
SYSTOLIC BLOOD PRESSURE: 118 MMHG | RESPIRATION RATE: 16 BRPM | HEIGHT: 66 IN | TEMPERATURE: 97 F | WEIGHT: 199 LBS | DIASTOLIC BLOOD PRESSURE: 74 MMHG | BODY MASS INDEX: 31.98 KG/M2 | OXYGEN SATURATION: 99 % | HEART RATE: 79 BPM

## 2021-05-07 DIAGNOSIS — F41.8 DEPRESSION WITH ANXIETY: Primary | ICD-10-CM

## 2021-05-07 PROCEDURE — 99213 OFFICE O/P EST LOW 20 MIN: CPT | Performed by: PHYSICIAN ASSISTANT

## 2021-05-07 RX ORDER — LAMOTRIGINE 25 MG/1
25 TABLET ORAL DAILY
Qty: 30 TABLET | Refills: 3 | Status: SHIPPED | OUTPATIENT
Start: 2021-05-07 | End: 2021-05-21 | Stop reason: DRUGHIGH

## 2021-05-07 RX ORDER — DULOXETIN HYDROCHLORIDE 60 MG/1
60 CAPSULE, DELAYED RELEASE ORAL DAILY
Qty: 30 CAPSULE | Refills: 1 | Status: SHIPPED | OUTPATIENT
Start: 2021-05-07 | End: 2021-07-14 | Stop reason: SDUPTHER

## 2021-05-07 NOTE — PROGRESS NOTES
Subjective   Eugenia Naik is a 50 y.o. female  Depression      History of Present Illness  Patient is a pleasant 50-year-old white female who presents for depression anxiety depression getting worse Savella no longer helping, patient actually takes Savella is making things worse she would like to go back on Cymbalta and be seen by psychiatry denies any SI/HI  The following portions of the patient's history were reviewed and updated as appropriate: allergies, current medications, past social history and problem list    Review of Systems   Constitutional: Negative for appetite change, diaphoresis, fatigue and unexpected weight change.   Eyes: Negative for visual disturbance.   Respiratory: Negative for cough, chest tightness and shortness of breath.    Cardiovascular: Negative for chest pain, palpitations and leg swelling.   Gastrointestinal: Negative for diarrhea, nausea and vomiting.   Endocrine: Negative for polydipsia, polyphagia and polyuria.   Skin: Negative for color change and rash.   Neurological: Negative for dizziness, syncope, weakness, light-headedness, numbness and headaches.       Objective     Vitals:    05/07/21 1612   BP: 118/74   Pulse: 79   Resp: 16   Temp: 97 °F (36.1 °C)   SpO2: 99%       Physical Exam  Vitals and nursing note reviewed.   Constitutional:       Appearance: She is well-developed. She is not diaphoretic.   Neck:      Thyroid: No thyromegaly.      Vascular: No JVD.   Cardiovascular:      Rate and Rhythm: Normal rate and regular rhythm.      Pulses: Normal pulses.      Heart sounds: Normal heart sounds. No murmur heard.     Pulmonary:      Effort: Pulmonary effort is normal. No respiratory distress.      Breath sounds: Normal breath sounds.   Abdominal:      General: Bowel sounds are normal.      Palpations: Abdomen is soft.      Tenderness: There is no abdominal tenderness.   Musculoskeletal:      Cervical back: Neck supple.   Lymphadenopathy:      Cervical: No cervical  adenopathy.   Skin:     General: Skin is warm and dry.   Neurological:      Sensory: No sensory deficit.         Assessment/Plan     Diagnoses and all orders for this visit:    1. Depression with anxiety (Primary)  -     DULoxetine (Cymbalta) 60 MG capsule; Take 1 capsule by mouth Daily.  Dispense: 30 capsule; Refill: 1  -     Ambulatory Referral to Psychiatry  -     lamoTRIgine (LaMICtal) 25 MG tablet; Take 1 tablet by mouth Daily.  Dispense: 30 tablet; Refill: 3    Refer to psychiatry     Restart Cymbalta 60 mg 1 p.o. every day dispense 30 DC Savella    Add Lamictal 25 mg at night dispense 30x2 refills    Follow-up with psychiatry

## 2021-05-17 DIAGNOSIS — F41.9 ANXIETY: ICD-10-CM

## 2021-05-19 DIAGNOSIS — F41.9 ANXIETY: ICD-10-CM

## 2021-05-19 RX ORDER — ALPRAZOLAM 1 MG/1
1 TABLET ORAL 2 TIMES DAILY
Qty: 60 TABLET | Refills: 3 | Status: CANCELLED | OUTPATIENT
Start: 2021-05-19

## 2021-05-19 RX ORDER — ALPRAZOLAM 1 MG/1
1 TABLET ORAL 2 TIMES DAILY
Qty: 60 TABLET | Refills: 3 | Status: SHIPPED | OUTPATIENT
Start: 2021-05-19 | End: 2021-07-30 | Stop reason: ALTCHOICE

## 2021-05-21 ENCOUNTER — TELEPHONE (OUTPATIENT)
Dept: FAMILY MEDICINE CLINIC | Facility: CLINIC | Age: 51
End: 2021-05-21

## 2021-05-21 RX ORDER — LAMOTRIGINE 25 MG/1
25 TABLET ORAL 2 TIMES DAILY
Qty: 60 TABLET | Refills: 1 | Status: SHIPPED | OUTPATIENT
Start: 2021-05-21 | End: 2021-06-17 | Stop reason: SDUPTHER

## 2021-05-26 DIAGNOSIS — M79.7 FIBROMYALGIA: Primary | ICD-10-CM

## 2021-05-26 RX ORDER — GABAPENTIN 300 MG/1
300 CAPSULE ORAL 2 TIMES DAILY
Qty: 60 CAPSULE | Refills: 3 | Status: SHIPPED | OUTPATIENT
Start: 2021-05-26 | End: 2021-08-24 | Stop reason: SDUPTHER

## 2021-06-09 ENCOUNTER — TELEPHONE (OUTPATIENT)
Dept: FAMILY MEDICINE CLINIC | Facility: CLINIC | Age: 51
End: 2021-06-09

## 2021-06-09 NOTE — TELEPHONE ENCOUNTER
MARGA CALLED FROM [employer] AND ADVISED PATIENT IS INTERESTED IN THEIR SERVICES AND SAID THAT BEVERLEY WASN'T REGISTERED WITH THEM AND WANTED TO KNOW IF HE WOULD BE INTERESTED IN HELPING THE PATIENT WITH THIS REQUEST    PLEASE CALL TO ADVISE    MARGA: [phone]

## 2021-06-17 RX ORDER — TIZANIDINE 4 MG/1
4 TABLET ORAL NIGHTLY
Qty: 30 TABLET | Refills: 1 | Status: SHIPPED | OUTPATIENT
Start: 2021-06-17 | End: 2021-07-30 | Stop reason: SDUPTHER

## 2021-06-17 RX ORDER — LAMOTRIGINE 25 MG/1
25 TABLET ORAL 3 TIMES DAILY
Qty: 90 TABLET | Refills: 3 | Status: SHIPPED | OUTPATIENT
Start: 2021-06-17 | End: 2021-07-30 | Stop reason: DRUGHIGH

## 2021-06-29 DIAGNOSIS — M79.7 FIBROMYALGIA: Primary | ICD-10-CM

## 2021-07-14 DIAGNOSIS — F41.8 DEPRESSION WITH ANXIETY: ICD-10-CM

## 2021-07-14 RX ORDER — DULOXETIN HYDROCHLORIDE 60 MG/1
60 CAPSULE, DELAYED RELEASE ORAL DAILY
Qty: 30 CAPSULE | Refills: 5 | Status: SHIPPED | OUTPATIENT
Start: 2021-07-14 | End: 2021-07-30 | Stop reason: SDUPTHER

## 2021-07-29 RX ORDER — PROPRANOLOL HYDROCHLORIDE 80 MG/1
80 CAPSULE, EXTENDED RELEASE ORAL DAILY
Qty: 30 CAPSULE | Refills: 2 | Status: SHIPPED | OUTPATIENT
Start: 2021-07-29 | End: 2021-09-15 | Stop reason: SDUPTHER

## 2021-07-30 DIAGNOSIS — F41.8 DEPRESSION WITH ANXIETY: ICD-10-CM

## 2021-07-30 RX ORDER — TIZANIDINE 4 MG/1
4 TABLET ORAL NIGHTLY
Qty: 90 TABLET | Refills: 1 | Status: SHIPPED | OUTPATIENT
Start: 2021-07-30 | End: 2022-02-15

## 2021-07-30 RX ORDER — DULOXETIN HYDROCHLORIDE 60 MG/1
60 CAPSULE, DELAYED RELEASE ORAL DAILY
Qty: 90 CAPSULE | Refills: 1 | Status: SHIPPED | OUTPATIENT
Start: 2021-07-30 | End: 2021-12-16

## 2021-08-24 DIAGNOSIS — M79.7 FIBROMYALGIA: ICD-10-CM

## 2021-08-24 RX ORDER — GABAPENTIN 300 MG/1
300 CAPSULE ORAL 2 TIMES DAILY
Qty: 180 CAPSULE | Refills: 0 | Status: SHIPPED | OUTPATIENT
Start: 2021-08-24 | End: 2021-11-05

## 2021-09-15 RX ORDER — PROPRANOLOL HYDROCHLORIDE 80 MG/1
80 CAPSULE, EXTENDED RELEASE ORAL DAILY
Qty: 90 CAPSULE | Refills: 0 | Status: SHIPPED | OUTPATIENT
Start: 2021-09-15 | End: 2021-12-30

## 2021-10-13 ENCOUNTER — IMMUNIZATION (OUTPATIENT)
Dept: VACCINE CLINIC | Facility: HOSPITAL | Age: 51
End: 2021-10-13

## 2021-10-13 PROCEDURE — 91300 HC SARSCOV02 VAC 30MCG/0.3ML IM: CPT | Performed by: INTERNAL MEDICINE

## 2021-10-13 PROCEDURE — 0004A ADM SARSCOV2 30MCG/0.3ML BOOSTER: CPT | Performed by: INTERNAL MEDICINE

## 2021-10-26 ENCOUNTER — OFFICE VISIT (OUTPATIENT)
Dept: FAMILY MEDICINE CLINIC | Facility: CLINIC | Age: 51
End: 2021-10-26

## 2021-10-26 ENCOUNTER — LAB (OUTPATIENT)
Dept: LAB | Facility: HOSPITAL | Age: 51
End: 2021-10-26

## 2021-10-26 VITALS
TEMPERATURE: 96.9 F | OXYGEN SATURATION: 99 % | WEIGHT: 208 LBS | BODY MASS INDEX: 33.43 KG/M2 | DIASTOLIC BLOOD PRESSURE: 74 MMHG | HEART RATE: 72 BPM | HEIGHT: 66 IN | SYSTOLIC BLOOD PRESSURE: 120 MMHG | RESPIRATION RATE: 14 BRPM

## 2021-10-26 DIAGNOSIS — Z00.00 ANNUAL PHYSICAL EXAM: ICD-10-CM

## 2021-10-26 LAB
25(OH)D3 SERPL-MCNC: 34.4 NG/ML
ALBUMIN SERPL-MCNC: 4.5 G/DL (ref 3.5–5.2)
ALBUMIN/GLOB SERPL: 2.1 G/DL
ALP SERPL-CCNC: 95 U/L (ref 39–117)
ALT SERPL W P-5'-P-CCNC: 18 U/L (ref 1–33)
ANION GAP SERPL CALCULATED.3IONS-SCNC: 7.7 MMOL/L (ref 5–15)
AST SERPL-CCNC: 17 U/L (ref 1–32)
BASOPHILS # BLD AUTO: 0.05 10*3/MM3 (ref 0–0.2)
BASOPHILS NFR BLD AUTO: 1.1 % (ref 0–1.5)
BILIRUB BLD-MCNC: NEGATIVE MG/DL
BILIRUB SERPL-MCNC: 0.4 MG/DL (ref 0–1.2)
BUN SERPL-MCNC: 11 MG/DL (ref 6–20)
BUN/CREAT SERPL: 10.8 (ref 7–25)
CALCIUM SPEC-SCNC: 9.4 MG/DL (ref 8.6–10.5)
CHLORIDE SERPL-SCNC: 104 MMOL/L (ref 98–107)
CHOLEST SERPL-MCNC: 216 MG/DL (ref 0–200)
CLARITY, POC: CLEAR
CO2 SERPL-SCNC: 27.3 MMOL/L (ref 22–29)
COLOR UR: YELLOW
CREAT SERPL-MCNC: 1.02 MG/DL (ref 0.57–1)
DEPRECATED RDW RBC AUTO: 39.8 FL (ref 37–54)
EOSINOPHIL # BLD AUTO: 0.08 10*3/MM3 (ref 0–0.4)
EOSINOPHIL NFR BLD AUTO: 1.8 % (ref 0.3–6.2)
ERYTHROCYTE [DISTWIDTH] IN BLOOD BY AUTOMATED COUNT: 12.7 % (ref 12.3–15.4)
EXPIRATION DATE: ABNORMAL
GFR SERPL CREATININE-BSD FRML MDRD: 57 ML/MIN/1.73
GLOBULIN UR ELPH-MCNC: 2.1 GM/DL
GLUCOSE SERPL-MCNC: 99 MG/DL (ref 65–99)
GLUCOSE UR STRIP-MCNC: NEGATIVE MG/DL
HCT VFR BLD AUTO: 40.7 % (ref 34–46.6)
HDLC SERPL-MCNC: 50 MG/DL (ref 40–60)
HGB BLD-MCNC: 13.7 G/DL (ref 12–15.9)
IMM GRANULOCYTES # BLD AUTO: 0.01 10*3/MM3 (ref 0–0.05)
IMM GRANULOCYTES NFR BLD AUTO: 0.2 % (ref 0–0.5)
KETONES UR QL: NEGATIVE
LDLC SERPL CALC-MCNC: 140 MG/DL (ref 0–100)
LDLC/HDLC SERPL: 2.73 {RATIO}
LEUKOCYTE EST, POC: ABNORMAL
LYMPHOCYTES # BLD AUTO: 0.98 10*3/MM3 (ref 0.7–3.1)
LYMPHOCYTES NFR BLD AUTO: 21.5 % (ref 19.6–45.3)
Lab: ABNORMAL
MCH RBC QN AUTO: 29.1 PG (ref 26.6–33)
MCHC RBC AUTO-ENTMCNC: 33.7 G/DL (ref 31.5–35.7)
MCV RBC AUTO: 86.6 FL (ref 79–97)
MONOCYTES # BLD AUTO: 0.44 10*3/MM3 (ref 0.1–0.9)
MONOCYTES NFR BLD AUTO: 9.6 % (ref 5–12)
NEUTROPHILS NFR BLD AUTO: 3 10*3/MM3 (ref 1.7–7)
NEUTROPHILS NFR BLD AUTO: 65.8 % (ref 42.7–76)
NITRITE UR-MCNC: NEGATIVE MG/ML
NRBC BLD AUTO-RTO: 0 /100 WBC (ref 0–0.2)
PH UR: 7.5 [PH] (ref 5–8)
PLATELET # BLD AUTO: 252 10*3/MM3 (ref 140–450)
PMV BLD AUTO: 10.1 FL (ref 6–12)
POTASSIUM SERPL-SCNC: 4.3 MMOL/L (ref 3.5–5.2)
PROT SERPL-MCNC: 6.6 G/DL (ref 6–8.5)
PROT UR STRIP-MCNC: NEGATIVE MG/DL
RBC # BLD AUTO: 4.7 10*6/MM3 (ref 3.77–5.28)
RBC # UR STRIP: NEGATIVE /UL
SODIUM SERPL-SCNC: 139 MMOL/L (ref 136–145)
SP GR UR: 1.01 (ref 1–1.03)
TRIGL SERPL-MCNC: 148 MG/DL (ref 0–150)
TSH SERPL DL<=0.05 MIU/L-ACNC: 1.57 UIU/ML (ref 0.27–4.2)
UROBILINOGEN UR QL: NORMAL
VLDLC SERPL-MCNC: 26 MG/DL (ref 5–40)
WBC # BLD AUTO: 4.56 10*3/MM3 (ref 3.4–10.8)

## 2021-10-26 PROCEDURE — 80053 COMPREHEN METABOLIC PANEL: CPT

## 2021-10-26 PROCEDURE — 85025 COMPLETE CBC W/AUTO DIFF WBC: CPT

## 2021-10-26 PROCEDURE — 82306 VITAMIN D 25 HYDROXY: CPT

## 2021-10-26 PROCEDURE — 93000 ELECTROCARDIOGRAM COMPLETE: CPT | Performed by: PHYSICIAN ASSISTANT

## 2021-10-26 PROCEDURE — 80061 LIPID PANEL: CPT

## 2021-10-26 PROCEDURE — 99396 PREV VISIT EST AGE 40-64: CPT | Performed by: PHYSICIAN ASSISTANT

## 2021-10-26 PROCEDURE — 84443 ASSAY THYROID STIM HORMONE: CPT

## 2021-10-26 PROCEDURE — 81003 URINALYSIS AUTO W/O SCOPE: CPT | Performed by: PHYSICIAN ASSISTANT

## 2021-10-26 PROCEDURE — 36415 COLL VENOUS BLD VENIPUNCTURE: CPT

## 2021-10-26 RX ORDER — ATORVASTATIN CALCIUM 10 MG/1
10 TABLET, FILM COATED ORAL DAILY
Qty: 90 TABLET | Refills: 3 | Status: SHIPPED | OUTPATIENT
Start: 2021-10-26 | End: 2022-05-25 | Stop reason: SDUPTHER

## 2021-10-26 RX ORDER — BUSPIRONE HYDROCHLORIDE 15 MG/1
1 TABLET ORAL 2 TIMES DAILY
COMMUNITY
Start: 2021-08-23

## 2021-10-26 NOTE — PROGRESS NOTES
Subjective   Eugenia Naik is a 51 y.o. female  Annual Exam (Fasting for labs. UTD on colonoscopy, MMG, Pap. Req flu vaccine. RF atorvastatin. )      History of Present Illness  Patient is a pleasant 51-year-old white female who comes in for preventive medical examination denies any problems with planes no shortness of breath no chest pain        The following portions of the patient's history were reviewed and updated as appropriate: allergies, current medications, past social history and problem list    Review of Systems   Constitutional: Negative for appetite change, diaphoresis, fatigue and unexpected weight change.   Eyes: Negative for visual disturbance.   Respiratory: Negative for cough, chest tightness and shortness of breath.    Cardiovascular: Negative for chest pain, palpitations and leg swelling.   Gastrointestinal: Negative for diarrhea, nausea and vomiting.   Endocrine: Negative for polydipsia, polyphagia and polyuria.   Skin: Negative for color change and rash.   Neurological: Negative for dizziness, syncope, weakness, light-headedness, numbness and headaches.       Objective     Vitals:    10/26/21 1000   BP: 120/74   Pulse: 72   Resp: 14   Temp: 96.9 °F (36.1 °C)   SpO2: 99%       Physical Exam  Vitals and nursing note reviewed.   Constitutional:       Appearance: She is well-developed. She is not diaphoretic.   Neck:      Thyroid: No thyromegaly.      Vascular: No JVD.   Cardiovascular:      Rate and Rhythm: Normal rate and regular rhythm.      Pulses: Normal pulses.      Heart sounds: Normal heart sounds. No murmur heard.      Pulmonary:      Effort: Pulmonary effort is normal. No respiratory distress.      Breath sounds: Normal breath sounds.   Abdominal:      General: Bowel sounds are normal.      Palpations: Abdomen is soft.      Tenderness: There is no abdominal tenderness.   Musculoskeletal:      Cervical back: Neck supple.   Lymphadenopathy:      Cervical: No cervical adenopathy.    Skin:     General: Skin is warm and dry.   Neurological:      Sensory: No sensory deficit.         ECG 12 Lead    Date/Time: 11/18/2021 11:40 AM  Performed by: Jeff Montesinos PA  Authorized by: Jeff Montesinos PA   Rhythm: sinus rhythm  Conduction: conduction normal  ST Segments: ST segments normal  T Waves: T waves normal    Clinical impression: normal ECG          Assessment/Plan     Diagnoses and all orders for this visit:    1. Annual physical exam  -     atorvastatin (LIPITOR) 10 MG tablet; Take 1 tablet by mouth Daily.  Dispense: 90 tablet; Refill: 3  -     Comprehensive Metabolic Panel; Future  -     CBC & Differential; Future  -     Lipid Panel; Future  -     TSH; Future  -     POCT urinalysis dipstick, automated     Preventive medicine discussed, diet, exercise, healthy living discussed at length.  Discussed nutrition, physical activity, healthy weight, injury prevention, missed use of tobacco, alcohol and drugs, dental health, mental health, immunizations, screening    Part of this note may be an electronic transcription/translation of spoken language to printed text using the Dragon Dictation System.

## 2021-11-04 DIAGNOSIS — M79.7 FIBROMYALGIA: ICD-10-CM

## 2021-11-05 RX ORDER — GABAPENTIN 300 MG/1
CAPSULE ORAL
Qty: 180 CAPSULE | Refills: 0 | Status: SHIPPED | OUTPATIENT
Start: 2021-11-05 | End: 2022-02-02 | Stop reason: SDUPTHER

## 2021-11-08 ENCOUNTER — TELEPHONE (OUTPATIENT)
Dept: FAMILY MEDICINE CLINIC | Facility: CLINIC | Age: 51
End: 2021-11-08

## 2021-12-16 DIAGNOSIS — F41.8 DEPRESSION WITH ANXIETY: ICD-10-CM

## 2021-12-16 RX ORDER — DULOXETIN HYDROCHLORIDE 60 MG/1
CAPSULE, DELAYED RELEASE ORAL
Qty: 90 CAPSULE | Refills: 3 | Status: SHIPPED | OUTPATIENT
Start: 2021-12-16 | End: 2022-05-25 | Stop reason: SDUPTHER

## 2021-12-30 RX ORDER — PROPRANOLOL HYDROCHLORIDE 80 MG/1
CAPSULE, EXTENDED RELEASE ORAL
Qty: 90 CAPSULE | Refills: 3 | Status: SHIPPED | OUTPATIENT
Start: 2021-12-30 | End: 2022-05-25 | Stop reason: SDUPTHER

## 2022-01-31 DIAGNOSIS — M79.7 FIBROMYALGIA: ICD-10-CM

## 2022-01-31 RX ORDER — GABAPENTIN 300 MG/1
CAPSULE ORAL
Qty: 180 CAPSULE | Refills: 0 | OUTPATIENT
Start: 2022-01-31

## 2022-02-02 DIAGNOSIS — M79.7 FIBROMYALGIA: ICD-10-CM

## 2022-02-03 RX ORDER — GABAPENTIN 300 MG/1
300 CAPSULE ORAL 2 TIMES DAILY
Qty: 180 CAPSULE | Refills: 0 | Status: SHIPPED | OUTPATIENT
Start: 2022-02-03 | End: 2022-04-26

## 2022-02-15 ENCOUNTER — OFFICE VISIT (OUTPATIENT)
Dept: FAMILY MEDICINE CLINIC | Facility: CLINIC | Age: 52
End: 2022-02-15

## 2022-02-15 VITALS
SYSTOLIC BLOOD PRESSURE: 122 MMHG | HEIGHT: 66 IN | WEIGHT: 193 LBS | DIASTOLIC BLOOD PRESSURE: 74 MMHG | RESPIRATION RATE: 14 BRPM | OXYGEN SATURATION: 99 % | BODY MASS INDEX: 31.02 KG/M2 | HEART RATE: 57 BPM | TEMPERATURE: 97.1 F

## 2022-02-15 DIAGNOSIS — M54.10 RADICULAR NEUROPATHY: Primary | ICD-10-CM

## 2022-02-15 PROCEDURE — 99213 OFFICE O/P EST LOW 20 MIN: CPT | Performed by: PHYSICIAN ASSISTANT

## 2022-02-15 NOTE — PROGRESS NOTES
Subjective   Eugenia Naik is a 51 y.o. female  Peripheral Neuropathy  Fibromyalgia    History of Present Illness  Patient is a pleasant 49-year-old white female who comes in for follow-up of neuropathy she is taking gabapentin is working well no problems or complaints needs refill meds working well states overall she is doing well she has a history of fibromyalgia which is controlled with med gabapentin  The following portions of the patient's history were reviewed and updated as appropriate: allergies, current medications, past social history and problem list    Review of Systems   Constitutional: Negative for appetite change, diaphoresis, fatigue and unexpected weight change.   Eyes: Negative for visual disturbance.   Respiratory: Negative for cough, chest tightness and shortness of breath.    Cardiovascular: Negative for chest pain, palpitations and leg swelling.   Gastrointestinal: Negative for diarrhea, nausea and vomiting.   Endocrine: Negative for polydipsia, polyphagia and polyuria.   Skin: Negative for color change and rash.   Neurological: Negative for dizziness, syncope, weakness, light-headedness, numbness and headaches.       Objective     Vitals:    02/15/22 1505   BP: 122/74   Pulse: 57   Resp: 14   Temp: 97.1 °F (36.2 °C)   SpO2: 99%       Physical Exam  Vitals and nursing note reviewed.   Constitutional:       General: She is not in acute distress.     Appearance: Normal appearance. She is well-developed. She is not ill-appearing, toxic-appearing or diaphoretic.   Neck:      Thyroid: No thyromegaly.      Vascular: No carotid bruit or JVD.   Cardiovascular:      Rate and Rhythm: Normal rate and regular rhythm.      Pulses: Normal pulses.      Heart sounds: Normal heart sounds. No murmur heard.      Pulmonary:      Effort: Pulmonary effort is normal. No respiratory distress.      Breath sounds: Normal breath sounds.   Abdominal:      Palpations: Abdomen is soft. There is no mass.       Tenderness: There is no abdominal tenderness.   Musculoskeletal:      Cervical back: Neck supple.   Lymphadenopathy:      Cervical: No cervical adenopathy.   Skin:     General: Skin is warm and dry.   Neurological:      Mental Status: She is alert.      Sensory: No sensory deficit.         Assessment/Plan     Diagnoses and all orders for this visit:    1. Radicular neuropathy (Primary)    Continue gabapentin 300 mg two times a day #60    I spent 15 minutes in patient care: Reviewing records prior to the visit, examining the patient, entering orders and documentation    Part of this note may be an electronic transcription/translation of spoken language to printed text using the Dragon Dictation System.

## 2022-04-23 DIAGNOSIS — M79.7 FIBROMYALGIA: ICD-10-CM

## 2022-04-26 RX ORDER — GABAPENTIN 300 MG/1
CAPSULE ORAL
Qty: 180 CAPSULE | Refills: 0 | Status: SHIPPED | OUTPATIENT
Start: 2022-04-26 | End: 2022-05-25 | Stop reason: SDUPTHER

## 2022-05-20 ENCOUNTER — PATIENT MESSAGE (OUTPATIENT)
Dept: FAMILY MEDICINE CLINIC | Facility: CLINIC | Age: 52
End: 2022-05-20

## 2022-05-20 NOTE — TELEPHONE ENCOUNTER
From: Eugenia Naik  To: JAIME Garcia  Sent: 5/20/2022 9:28 AM EDT  Subject: Insurance change, send prescriptions    Hi! I have changed insurance and I need [facility]. Could you please send them for me? I have updated my insurance info in the computer. It is [facility].   Thanks   Eugenia

## 2022-05-25 DIAGNOSIS — M79.7 FIBROMYALGIA: ICD-10-CM

## 2022-05-25 DIAGNOSIS — F41.8 DEPRESSION WITH ANXIETY: ICD-10-CM

## 2022-05-25 DIAGNOSIS — Z00.00 ANNUAL PHYSICAL EXAM: ICD-10-CM

## 2022-05-25 RX ORDER — PROPRANOLOL HYDROCHLORIDE 80 MG/1
80 CAPSULE, EXTENDED RELEASE ORAL DAILY
Qty: 90 CAPSULE | Refills: 3 | Status: SHIPPED | OUTPATIENT
Start: 2022-05-25

## 2022-05-25 RX ORDER — DULOXETIN HYDROCHLORIDE 60 MG/1
60 CAPSULE, DELAYED RELEASE ORAL DAILY
Qty: 90 CAPSULE | Refills: 3 | Status: SHIPPED | OUTPATIENT
Start: 2022-05-25 | End: 2023-03-01

## 2022-05-25 RX ORDER — GABAPENTIN 300 MG/1
300 CAPSULE ORAL 2 TIMES DAILY
Qty: 180 CAPSULE | Refills: 0 | Status: SHIPPED | OUTPATIENT
Start: 2022-05-25 | End: 2022-10-27 | Stop reason: SDUPTHER

## 2022-05-25 RX ORDER — ATORVASTATIN CALCIUM 10 MG/1
10 TABLET, FILM COATED ORAL DAILY
Qty: 90 TABLET | Refills: 3 | Status: SHIPPED | OUTPATIENT
Start: 2022-05-25

## 2022-05-25 NOTE — TELEPHONE ENCOUNTER
Patient has new insurance and needs new prescriptions for atorvastatin, propranolol, atorvastatin, and gabapentin sent to [facility].    I have pended them for you..

## 2022-06-09 ENCOUNTER — LAB (OUTPATIENT)
Dept: LAB | Facility: HOSPITAL | Age: 52
End: 2022-06-09

## 2022-06-09 ENCOUNTER — E-VISIT (OUTPATIENT)
Dept: FAMILY MEDICINE CLINIC | Facility: TELEHEALTH | Age: 52
End: 2022-06-09

## 2022-06-09 ENCOUNTER — TELEMEDICINE (OUTPATIENT)
Dept: FAMILY MEDICINE CLINIC | Facility: TELEHEALTH | Age: 52
End: 2022-06-09

## 2022-06-09 DIAGNOSIS — Z20.822 CLOSE EXPOSURE TO COVID-19 VIRUS: ICD-10-CM

## 2022-06-09 DIAGNOSIS — Z20.822 CLOSE EXPOSURE TO COVID-19 VIRUS: Primary | ICD-10-CM

## 2022-06-09 LAB — SARS-COV-2 RNA NOSE QL NAA+PROBE: NOT DETECTED

## 2022-06-09 PROCEDURE — BHEMPVIDEOVISIT: Performed by: NURSE PRACTITIONER

## 2022-06-09 PROCEDURE — U0004 COV-19 TEST NON-CDC HGH THRU: HCPCS

## 2022-06-09 NOTE — PROGRESS NOTES
HPI  Eugenia Naik is a 52 y.o. female  presents with complaint of needing covid test.  tested positive yesterday. She woke up today with headache and sore throat. Denies any other symptoms. [employer] employee that failed screening tool. Has had covid vaccine and booster.    Review of Systems    Past Medical History:   Diagnosis Date   • Acute maxillary sinusitis    • Anxiety and depression    • Arthralgia    • Arthritis     knee and foot   • Colicky RUQ abdominal pain    • Constipation    • Depression    • Dizziness and giddiness    • Essential tremor     propranolol for this   • GERD (gastroesophageal reflux disease)    • High cholesterol    • HPV in female    • Lumbago    • Metatarsalgia, right foot    • PONV (postoperative nausea and vomiting)    • Pustule    • Spinal headache     with csection   • Tremor    • Wears reading eyeglasses        Family History   Problem Relation Age of Onset   • Stroke Mother    • Heart disease Mother    • Arthritis Mother    • Cancer Other    • Heart disease Other    • Depression Other    • Heart attack Other    • Depression Child    • Anesthesia problems Other    • Rheum arthritis Other    • Hypertension Other    • Hyperlipidemia Daughter        Social History     Socioeconomic History   • Marital status:    Tobacco Use   • Smoking status: Never Smoker   • Smokeless tobacco: Never Used   Substance and Sexual Activity   • Alcohol use: Yes     Alcohol/week: 4.0 standard drinks     Types: 3 Glasses of wine, 1 Cans of beer per week     Comment: Occ   • Drug use: No   • Sexual activity: Yes     Partners: Male     Birth control/protection: Surgical         LMP 04/07/2018 Comment: LAST MAMMOGRAM 6/2018    PHYSICAL EXAM  Physical Exam   Constitutional: She appears well-developed and well-nourished.   HENT:   Head: Normocephalic.   Nose: Nose normal.   Neck: Neck normal appearance.  Pulmonary/Chest: Effort normal.   Neurological: She is alert.   Psychiatric: She  has a normal mood and affect. Her speech is normal.       Diagnoses and all orders for this visit:    1. Close exposure to COVID-19 virus (Primary)  -     COVID-19 PCR, [facility], NP SWAB IN [facility] TRANSPORT MEDIA/ORAL SWISH 24-30 HR TAT - Swab, Nasopharynx; Future          FOLLOW-UP  As discussed during visit with [facility], if symptoms worsen or fail to improve, follow-up with PCP/Urgent Care/Emergency Department.    Patient verbalizes understanding of medications, instructions for treatment and follow-up.    Ann Barcenas, VANESSA  06/09/2022  13:57 EDT    The use of a video visit has been reviewed with the patient and verbal informed consent has been obtained. Myself and Eugenia Naik participated in this visit. The patient is located in [place], [place], and I am located in [place], [place]. MyChart and Zoom were utilized.

## 2022-06-09 NOTE — PATIENT INSTRUCTIONS
10 Things You Can Do to Manage Your COVID-19 Symptoms at Home  If you have possible or confirmed COVID-19:  Stay home except to get medical care.  Monitor your symptoms carefully. If your symptoms get worse, call your healthcare provider immediately.  Get rest and stay hydrated.  If you have a medical appointment, call the healthcare provider ahead of time and tell them that you have or may have COVID-19.  For medical emergencies, call 911 and notify the dispatch personnel that you have or may have COVID-19.  Cover your cough and sneezes with a tissue or use the inside of your elbow.  Wash your hands often with soap and water for at least 20 seconds or clean your hands with an alcohol-based hand  that contains at least 60% alcohol.  As much as possible, stay in a specific room and away from other people in your home. Also, you should use a separate bathroom, if available. If you need to be around other people in or outside of the home, wear a mask.  Avoid sharing personal items with other people in your household, like dishes, towels, and bedding.  Clean all surfaces that are touched often, like counters, tabletops, and doorknobs. Use household cleaning sprays or wipes according to the label instructions.  cdc.gov/coronavirus  07/16/2021  This information is not intended to replace advice given to you by your health care provider. Make sure you discuss any questions you have with your health care provider.  Document Revised: 11/01/2021 Document Reviewed: 11/01/2021  [employer] Patient Education © 2021 [employer]

## 2022-06-09 NOTE — E-VISIT ESCALATED
Patient escalated   Provider Jessica Rodas chose to escalate patient to another level of care because: Patient needs a lab test   Patient was sent the following message:   Based on the information you've provided us, you need to take another step to get care.   What to do now:   Setup a video visit   Please, schedule your video visit   Video visit     You won't be charged for your eVisit. If you paid with a credit card, the charge will be reversed.   Chief Complaint: Coronavirus (COVID-19), cold, sinus pain, allergy, or flu   Patient introduction   Patient is 52-year-old female who reports sore throat and headache that started < 48 hours ago. Regarding date of symptom onset, patient writes: 06/08/22.   When asked why they're seeking care online today, patient reports they want to get tested for COVID-19.   COVID-19 exposure, testing history, and vaccination status:    Close contact with a person with a confirmed case of COVID-19 within the last 2 days.    No recent travel outside of their local community.    Patient had a viral lab test 15-30 days ago. Test result was negative.    Reports receiving 4 doses of the COVID-19 vaccine (Pfizer, Pfizer, Pfizer, Pfizer). Received their most recent dose of the vaccine > 14 days ago.   Risk factors for severe disease from COVID-19 infection:    BMI >= 25    Healthcare worker    A mood disorder   Patient reports they were directed by their employer to get a COVID-19 test.   Patient did not request an excuse note.   General presentation   Symptoms came on gradually.   Fever:    Denies fever.   Sinus and nasal symptoms:    Denies nasal or sinus congestion.    Denies nasal discharge.    Denies itchy nose or sneezing.   Sore throat:    Reports sore throat.    Reports previous tonsillectomy.    Denies recent strep exposure.    Patient does not think they have strep.    Patient is able to swallow liquids and solid foods with ease.   Head and body aches:    Reports headache,  described as moderate (4-6 on a scale of 1-10).    Denies sweats.    Denies chills.    Denies myalgia.    Denies fatigue.   Cough:    Denies cough.   Wheezing and SOB:    Denies COPD diagnosis.    Denies asthma diagnosis.    Denies wheezing.    Denies shortness of breath.   Chest pain:    Denies chest pain.   Ear pressure/pain:    Reports pressure.   Dizziness:    Reports mild dizziness that does not interfere with daily activities.   Allergies:    Denies history of allergies.   Flu exposure:    Denies recent exposure to confirmed flu diagnosis.    Reports a flu vaccine more than 6 months ago.   Patient denies the following red flags:    Changes in alertness or awareness    Symptoms suggesting airway obstruction    Symptoms suggesting intracranial hemorrhage    Decreased urination   Pregnancy/menstrual status/breastfeeding:    Patient is postmenopausal   Self-exam:    No difficulty moving their chin toward their chest    No palatal petechiae    Swollen and tender neck lymph nodes    Denies antibiotic treatment for similar symptoms within the past month   Current medications   Denies taking over-the-counter medication for current symptoms.   Reports taking 1-Propanol, Cymbalta Oral Product, Ascorbic Acid / Folic Acid, gabapentin, Lamictal Oral Product, Trazodone, and Buspar.   Medication allergies   None.   Medication contraindication review   Denies history of anaphylactic reaction to beta-lactam antibiotics; aspirin triad; blood dyscrasia; bone marrow depression; catecholamine-releasing paraganglioma; coronary artery disease; coagulation disorder; congenital long QT syndrome; depression; electrolyte abnormalities; fungal infection; GI bleeding; GI obstruction; G6PD deficiency; heart arrhythmia; hypertension; kidney disease or hemodialysis; mononucleosis; myasthenia; recent myocardial infarction; NSAID-induced asthma/urticaria; Parkinson's disease; pheochromocytoma; porphyria; Reye syndrome; seizure disorder;  ulcerative colitis; and urinary retention.   Denies history of metoclopramide-associated dystonic reaction and tardive dyskinesia.   No known history of amoxicillin-clavulanate-associated cholestatic jaundice or hepatic impairment.   No known history of azithromycin-associated cholestatic jaundice or hepatic impairment.   Past medical history   Immune conditions: Denies immunocompromising conditions. Denies history of cancer.   Social history   Reports being a healthcare worker.   Non-smoker.   Assessment:   Patient determined to need a level of care not appropriate to be delivered through eVisit.   Plan:   Patient informed of need to seek in-person care   ----------   Electronically signed by VANESSA Cohen on 2022-06-09 at 09:45AM   ----------   Patient Interview Transcript:   Why are you getting care through eVisit today? We can't guarantee a specific treatment or test. Your provider will decide what's best for you. Select all that apply.    I want to be tested for COVID-19   Not selected:    I want to know if I have a cold or something more serious    I want to know if I need to be seen by a provider    I need a doctor's note    I want to get the COVID-19 vaccine    I think I'm having side effects from the COVID-19 vaccine    I just want to feel better!    None of the above   Do any of these statements apply to you? Select all that apply.    My employer directed me to get a COVID-19 test   Not selected:    The public health department directed me to get a COVID-19 test    I need to get a COVID-19 test BEFORE I travel    I need to get a COVID-19 test AFTER traveling in the last week    None of the above   COVID-19 symptoms are similar to symptoms of other illnesses. This makes it difficult to diagnose. Please carefully consider each question and answer as best you can. This will help your provider make the right diagnosis. Which of these symptoms are bothering you? Select all that apply.    Sore throat     Headache   Not selected:    Cough    Shortness of breath    Fever    Stuffed-up nose or sinuses    Runny nose    Itchy or watery eyes    Itchy nose or sneezing    Loss of smell or taste    Hoarse voice or loss of voice    Sweats    Chills    Muscle or body aches    Fatigue or tiredness    Nausea or vomiting    Diarrhea    I don't have any of these symptoms   Do you have any of these conditions? These conditions can put you at increased risk for severe disease if you're infected with COVID-19. Select all that apply.    Mood disorder, including depression or schizophrenia spectrum disorders   Not selected:    Chronic lung disease, such as cystic fibrosis or interstitial fibrosis    Heart disease, such as congenital heart disease, congestive heart failure, or coronary artery disease    Disorder of the brain, spinal cord, or nerves and muscles, such as dementia, cerebral palsy, epilepsy, muscular dystrophy, or developmental delay    Metabolic disorder or mitochondrial disease    Cerebrovascular disease, such as stroke or another condition affecting the blood vessels or blood supply to the brain    Down syndrome    Substance use disorder, such as alcohol, opioid, or cocaine use disorder    Tuberculosis    None of the above   Do you live in a group care setting? Examples include: - Nursing home - Residential care - Psychiatric treatment facility - Group home - [facility] - [facility] and care home - Homeless shelter - Foster care setting Select one.    No   Not selected:    Yes   Have you ever been tested for COVID-19? Select one.    Yes   Not selected:    No   When was your most recent COVID-19 test? Select one.    15 to 30 days ago   Not selected:    Within the last week (specify date as MM/DD/YY)    7 to 14 days ago    1 to 3 months ago    More than 3 months ago   What type of COVID-19 test did you most recently have? There are two types of COVID-19 tests: - Viral tests check if you're currently infected with COVID-19. For  these tests, a nose swab or saliva sample is taken. Viral tests include self-tests and tests done at a doctor's office, lab, or testing site. - Antibody tests check if you've been infected in the past. For these tests, your blood is drawn. Antibody tests can only be done at a doctor's office, lab, or testing site. Select one.    Viral test at a doctor's office, lab, or testing site   Not selected:    Viral self-test    Antibody test   What was the result of your most recent COVID-19 test? Select one.    Negative   Not selected:    Positive    I'm not sure   Have you gotten the COVID-19 vaccine? Select one.    Yes   Not selected:    No   How many doses of the COVID-19 vaccine have you gotten? This includes boosters as well as third or fourth doses for those who are immunocompromised. Select one.    4 doses   Not selected:    1 dose    2 doses    3 doses   Which COVID-19 vaccine did you get for your first dose? Check your Vaccination Record Card under Product Name/. Select one.    Pfizer-BioNTech (Pfizer)   Not selected:    Eugene & Eugene's Bea Vaccine (J&J/Bea)    Moderna   Which COVID-19 vaccine did you get for your second dose? Check your Vaccination Record Card under Product Name/. Select one.    Pfizer-BioNTech (Pfizer)   Not selected:    Eugene & Eugene's Bea Vaccine (J&J/Bea)    Moderna   Which COVID-19 vaccine did you get for your third dose? Check your Vaccination Record Card under Product Name/. Select one.    Pfizer-BioNTech (Pfizer)   Not selected:    Eugene & Eugene's Bea Vaccine (J&J/CMOSIS nv)    Moderna   Which COVID-19 vaccine did you get for your fourth dose? Check your Vaccination Record Card under Product Name/. Select one.    Pfizer-BioNTech (Pfizer)   Not selected:    Moderna   When did you get your most recent dose of the COVID-19 vaccine?    More than 14 days ago   Not selected:    Less than 48 hours (2 days) ago    48 to  "72 hours (3 days) ago    3 to 5 days ago    5 to 7 days ago    7 to 14 days ago   In the last 14 days, have you traveled outside of your local community? This includes travel by car, RV, bus, train, or plane. Travel increases your chances of getting and spreading COVID-19. Select one.    No   Not selected:    Yes   In the last 14 days, have you had close contact with someone who has coronavirus (COVID-19)? \"Close contact\" means any of these: - Living in the same household as someone with COVID-19. - Caring for someone with COVID-19. - Being within 6 feet of someone with COVID-19 for a total of at least 15 minutes over a 24-hour period. For example, three 5-minute exposures for a total of 15 minutes. - Being in direct contact with respiratory droplets from someone with COVID-19 (being coughed on, kissing, sharing utensils). Select one.    Yes, a confirmed case   Not selected:    Yes, a suspected case    No, not that I know of   When did the close contact happen? Select one.    Within the last 2 days   Not selected:    3 to 7 days ago    More than 7 days ago   When did your current symptoms start? Select one.    Less than 48 hours ago   Not selected:    3 to 5 days ago    6 to 9 days ago    10 to 14 days ago    2 to 4 weeks ago    More than a month ago   Do you know the exact date your symptoms started? If so, enter the date as MM/DD/YY. Select one.    Yes (specify): 06/08/22   Not selected:    No   Did your symptoms come on suddenly or gradually? Select one.    Gradually   Not selected:    Suddenly    I'm not sure   You mentioned having a headache. On a scale of 1 to 10, how severe is your headache pain? Select one.    Moderate (4 to 6)   Not selected:    Mild (1 to 3)    Severe (7 to 9)    Unbearable (10)    The worst headache of my life (10+)   Can you swallow liquids and solid foods? A sore throat may be painful when swallowing, but it shouldn't prevent you from swallowing. Select one.    Yes, with ease   Not " selected:    Yes, but it's uncomfortable    Yes, but it's painful    It's hard to swallow anything because it feels like liquids and food get stuck in my throat    No, I can't swallow anything, liquid or solid foods   Since your symptoms started, have you felt dizzy? Select one.    Yes, but I can continue with my regular daily activities   Not selected:    Yes, and it makes it hard to stand, walk, or do daily activities    No   Do you have chest pain? You might also feel it as discomfort, aching, tightness, or squeezing in the chest. Select one.    No   Not selected:    Yes   Have you urinated at least 3 times in the last 24 hours? Select one.    Yes   Not selected:    No    I'm not sure   Changes in alertness or awareness may mean you need emergency care. Since your symptoms started, have you had any of these? Select all that apply.    None of the above   Not selected:    Confusion    Slurred speech    Not knowing where you are or what day it is    Difficulty staying conscious    Fainting or passing out   Do your symptoms include a whistling sound, or wheezing, when you breathe? Select one.    No   Not selected:    Yes    I'm not sure   Do you have any of these symptoms in your ear(s)? Select all that apply.    Pressure   Not selected:    Pain    Fullness    Crackling or popping    Plugged or blocked sensation    None of the above   Can you move your chin toward your chest?    Yes   Not selected:    No, my neck is too stiff   Are your tonsils larger than usual?    I've had my tonsils removed   Not selected:    Yes    No    I'm not sure   Are there red spots on the roof of your mouth or the back of your throat?    No   Not selected:    Yes    I'm not sure   Are your glands/lymph nodes swollen, or does it hurt when you touch them?    Yes   Not selected:    No    I'm not sure   People with a very high body mass index (BMI) are at higher risk for developing complications from the flu and severe illness from COVID-19.  To determine your BMI, we need to know your weight and height. Please enter your weight (in pounds).    Weight   Please enter your height.    Height   In the past 2 weeks, has anyone around you (such as at school, work, or home) had a confirmed diagnosis of strep throat? A confirmed diagnosis means that a throat swab and lab test were done to verify a strep throat infection. Select one.    No   Not selected:    Yes    I'm not sure   Do you think you might have strep throat? Select one.    No   Not selected:    Yes    I'm not sure   In the past week, has anyone around you (such as at school, work, or home) had a confirmed diagnosis of the flu? A confirmed diagnosis means that a nose swab was done to verify a flu infection. Select all that apply.    No   Not selected:    I live with someone who has the flu    I've been within touching distance of someone who has the flu    I've walked by, or sat about 3 feet away from, someone who has the flu    I've been in the same building as someone who has the flu    I'm not sure   Have you ever been diagnosed with asthma? Select one.    No   Not selected:    Yes   Have you ever been diagnosed with chronic obstructive pulmonary disease (COPD)? Select one.    No   Not selected:    Yes    I'm not sure   Do you have allergies (pollen, dust mites, mold, animal dander)? Select one.    No   Not selected:    Yes    I'm not sure   Have you had a flu shot this season? Select one.    Yes, more than 6 months ago   Not selected:    Yes, less than 2 weeks ago    Yes, 2 to 4 weeks ago    Yes, 1 to 3 months ago    Yes, 3 to 6 months ago    I'm not sure    No   The flu and COVID-19 can be more serious for people with certain conditions or characteristics. These questions help us figure out if you or anyone you live with is at higher risk for complications from these infections. Do either of these statements apply to you? Select all that apply.    I'm a healthcare worker   Not selected:    I'm   or Native [place]    None of the above   Do you smoke tobacco? Select one.    No, never   Not selected:    Yes, every day    Yes, some days    No, I quit   Some conditions can put you at risk for more serious infections. Do any of these apply to you? Select all that apply.    None of the above   Not selected:    I've been hospitalized within the last 5 days    I have diabetes    I'm in close contact with a child in    Are you currently being treated for any of these conditions? Scroll to see all options. Select all that apply.    None of the above   Not selected:    Aspirin triad (also known as Samter's triad or ASA triad)    Asthma or hives from taking aspirin or other NSAIDs, such as ibuprofen or naproxen    Blockage or narrowing of the blood vessels of the heart    Blood dyscrasia, such anemia, leukemia, lymphoma, or myeloma    Bone marrow depression    Catecholamine-releasing paraganglioma    Blood clotting disorder    Congenital long QT syndrome    Depression    Difficulty urinating or completely emptying your bladder    Uncorrected electrolyte abnormalities    Fungal infection    Gastrointestinal (GI) bleeding    Gastrointestinal (GI) obstruction    G6PD deficiency    Recent heart attack    High blood pressure    Irregular heartbeat or heart rhythm    Kidney disease or hemodialysis    Mononucleosis (mono)    Myasthenia gravis    Parkinson's disease    Pheochromocytoma    Reye syndrome    Seizure disorder    Ulcerative colitis   Do you have any of these conditions that can affect the immune system? Scroll to see all options. Select all that apply.    None of these   Not selected:    History of bone marrow transplant    Chronic kidney disease    Chronic liver disease (including cirrhosis)    HIV/AIDS    Inflammatory bowel disease (Crohn's disease or ulcerative colitis)    Lupus    Moderate to severe plaque psoriasis    Multiple sclerosis    Rheumatoid arthritis    Sickle cell anemia     Alpha or beta thalassemia    History of solid organ transplant (kidney, liver, or heart)    History of spleen removal    An autoimmune disorder not listed here    A condition requiring treatment with long-term use of oral steroids (such as prednisone, prednisolone, or dexamethasone)   Have you ever been diagnosed with cancer? Select one.    No   Not selected:    Yes, I have cancer now    Yes, but I'm in remission   Have you ever had either of these conditions? Select all that apply.    No   Not selected:    Metoclopramide-associated dystonic reaction    Tardive dyskinesia   Have you gone through menopause? Select one.    Yes   Not selected:    No    I'm going through it now   Do any of these apply to the people who live with you? Select all that apply.    None of the above   Not selected:    A child under the age of 5    An adult 65 or older    A person who is pregnant    A person who has given birth, had a miscarriage, had a pregnancy loss, or had an  in the last 2 weeks    An  or Native [place]   Does any member of your household have any of these medical conditions? Select all that apply.    None of the above   Not selected:    Asthma    Disorders of the brain, spinal cord, or nerves and muscles, such as dementia, cerebral palsy, epilepsy, muscular dystrophy, or developmental delay    Chronic lung disease, such as COPD or cystic fibrosis    Heart disease, such as congenital heart disease, congestive heart failure, or coronary artery disease    Cerebrovascular disease, such as stroke or another condition affecting the blood vessels or blood supply to the brain    Blood disorders, such as sickle cell disease    Diabetes    Metabolic disorders such as inherited metabolic disorders or mitochondrial disease    Kidney disorders    Liver disorders    Weakened immune system due to illness or medications such as chemotherapy or steroids    Children under the age of 19 who are on long-term aspirin  therapy    Extreme obesity (BMI > 40)   Just a few more questions about medications, and then you're finished. Have you used any non-prescription medications or nasal sprays for your current symptoms? Examples include saline sprays, decongestants, NyQuil, and Tylenol. Select one.    No   Not selected:    Yes   In the past month, have you taken antibiotics for similar symptoms? Examples of antibiotics include amoxicillin, amoxicillin-clavulanate (Augmentin), penicillin, cefdinir (Omnicef), doxycycline, and clindamycin (Cleocin). Select one.    No   Not selected:    Yes    I'm not sure   Have you taken any monoamine oxidase inhibitor (MAOI) medications in the last 14 days? Examples include rasagiline (Azilect), selegiline (Eldepryl, Zelapar), isocarboxazid (Marplan), phenelzine (Nardil), and tranylcypromine (Parnate). Select one.    No   Not selected:    Yes    I'm not sure   Do you take Kynmobi or Apokyn (apomorphine)? Select one.    No   Not selected:    Yes    I'm not sure   Are you taking any other medications or supplements? On the next screen, you need to list all vitamins, supplements, non-prescription medications (such as aspirin or Aleve), and prescription medications that you're taking. Select one.    Yes   Not selected:    Yes, but I'm not sure what they are    No   Have you ever had an allergic or bad reaction to any medication? Select one.    No   Not selected:    Yes   Are you allergic to milk or to the proteins found in milk (for example, whey or casein)? A milk allergy is different from lactose intolerance. Select one.    No   Not selected:    Yes    I'm not sure   Have you ever had jaundice or liver problems as a result of taking amoxicillin-clavulanate (Augmentin)? Jaundice is a condition in which the skin and the whites of the eyes turn yellow. Select all that apply.    No, not that I know of   Not selected:    Yes, jaundice    Yes, liver problems   Have you ever had jaundice or liver problems as a  result of taking azithromycin (Zithromax, Zmax)? Jaundice is a condition in which the skin and the whites of the eyes turn yellow. Select all that apply.    No, not that I know of   Not selected:    Yes, jaundice    Yes, liver problems   Do you need a doctor's note? A doctor's note confirms that you received care today and states when you can return to school or work. It does not contain information about your diagnosis or treatment plan. Your provider will make the final decision on whether to give you a doctor's note and for how long. Doctor's notes CANNOT be backdated. We can't provide medical leave paperwork through this type of visit. If more paperwork is needed to request time off, contact your primary care provider. Select one.    No   Not selected:    Today only (1 day)    Today and tomorrow (2 days)    3 days    7 days    10 days    14 days   Is there anything else you'd like to tell us about your symptoms?   The patient did not enter any additional information.   ----------   Medical history   Medical history data does not currently exist for this patient.

## 2022-10-27 ENCOUNTER — OFFICE VISIT (OUTPATIENT)
Dept: FAMILY MEDICINE CLINIC | Facility: CLINIC | Age: 52
End: 2022-10-27

## 2022-10-27 ENCOUNTER — LAB (OUTPATIENT)
Dept: LAB | Facility: HOSPITAL | Age: 52
End: 2022-10-27

## 2022-10-27 VITALS
RESPIRATION RATE: 14 BRPM | HEART RATE: 58 BPM | OXYGEN SATURATION: 99 % | BODY MASS INDEX: 30.7 KG/M2 | WEIGHT: 191 LBS | HEIGHT: 66 IN | TEMPERATURE: 97.1 F | DIASTOLIC BLOOD PRESSURE: 78 MMHG | SYSTOLIC BLOOD PRESSURE: 124 MMHG

## 2022-10-27 DIAGNOSIS — M79.7 FIBROMYALGIA: ICD-10-CM

## 2022-10-27 DIAGNOSIS — Z51.81 ENCOUNTER FOR THERAPEUTIC DRUG MONITORING: ICD-10-CM

## 2022-10-27 DIAGNOSIS — Z00.00 ANNUAL PHYSICAL EXAM: ICD-10-CM

## 2022-10-27 DIAGNOSIS — Z00.00 ANNUAL PHYSICAL EXAM: Primary | ICD-10-CM

## 2022-10-27 DIAGNOSIS — Z23 FLU VACCINE NEED: ICD-10-CM

## 2022-10-27 LAB
ALBUMIN SERPL-MCNC: 4.5 G/DL (ref 3.5–5.2)
ALBUMIN/GLOB SERPL: 2.6 G/DL
ALP SERPL-CCNC: 81 U/L (ref 39–117)
ALT SERPL W P-5'-P-CCNC: 21 U/L (ref 1–33)
AMPHET+METHAMPHET UR QL: NEGATIVE
AMPHETAMINES UR QL: NEGATIVE
ANION GAP SERPL CALCULATED.3IONS-SCNC: 9.2 MMOL/L (ref 5–15)
AST SERPL-CCNC: 18 U/L (ref 1–32)
BARBITURATES UR QL SCN: NEGATIVE
BENZODIAZ UR QL SCN: NEGATIVE
BILIRUB SERPL-MCNC: 0.5 MG/DL (ref 0–1.2)
BUN SERPL-MCNC: 11 MG/DL (ref 6–20)
BUN/CREAT SERPL: 11.1 (ref 7–25)
BUPRENORPHINE SERPL-MCNC: NEGATIVE NG/ML
CALCIUM SPEC-SCNC: 9.2 MG/DL (ref 8.6–10.5)
CANNABINOIDS SERPL QL: NEGATIVE
CHLORIDE SERPL-SCNC: 103 MMOL/L (ref 98–107)
CHOLEST SERPL-MCNC: 220 MG/DL (ref 0–200)
CO2 SERPL-SCNC: 25.8 MMOL/L (ref 22–29)
COCAINE UR QL: NEGATIVE
CREAT SERPL-MCNC: 0.99 MG/DL (ref 0.57–1)
DEPRECATED RDW RBC AUTO: 40.6 FL (ref 37–54)
EGFRCR SERPLBLD CKD-EPI 2021: 68.3 ML/MIN/1.73
ERYTHROCYTE [DISTWIDTH] IN BLOOD BY AUTOMATED COUNT: 12.4 % (ref 12.3–15.4)
GLOBULIN UR ELPH-MCNC: 1.7 GM/DL
GLUCOSE SERPL-MCNC: 93 MG/DL (ref 65–99)
HCT VFR BLD AUTO: 40.8 % (ref 34–46.6)
HDLC SERPL-MCNC: 61 MG/DL (ref 40–60)
HGB BLD-MCNC: 13.5 G/DL (ref 12–15.9)
LDLC SERPL CALC-MCNC: 138 MG/DL (ref 0–100)
LDLC/HDLC SERPL: 2.22 {RATIO}
MCH RBC QN AUTO: 29.9 PG (ref 26.6–33)
MCHC RBC AUTO-ENTMCNC: 33.1 G/DL (ref 31.5–35.7)
MCV RBC AUTO: 90.3 FL (ref 79–97)
METHADONE UR QL SCN: NEGATIVE
OPIATES UR QL: NEGATIVE
OXYCODONE UR QL SCN: NEGATIVE
PCP UR QL SCN: NEGATIVE
PLATELET # BLD AUTO: 254 10*3/MM3 (ref 140–450)
PMV BLD AUTO: 9.7 FL (ref 6–12)
POTASSIUM SERPL-SCNC: 4.1 MMOL/L (ref 3.5–5.2)
PROPOXYPH UR QL: NEGATIVE
PROT SERPL-MCNC: 6.2 G/DL (ref 6–8.5)
RBC # BLD AUTO: 4.52 10*6/MM3 (ref 3.77–5.28)
SODIUM SERPL-SCNC: 138 MMOL/L (ref 136–145)
TRICYCLICS UR QL SCN: NEGATIVE
TRIGL SERPL-MCNC: 119 MG/DL (ref 0–150)
TSH SERPL DL<=0.05 MIU/L-ACNC: 1.94 UIU/ML (ref 0.27–4.2)
VLDLC SERPL-MCNC: 21 MG/DL (ref 5–40)
WBC NRBC COR # BLD: 5.44 10*3/MM3 (ref 3.4–10.8)

## 2022-10-27 PROCEDURE — 99396 PREV VISIT EST AGE 40-64: CPT | Performed by: PHYSICIAN ASSISTANT

## 2022-10-27 PROCEDURE — 36415 COLL VENOUS BLD VENIPUNCTURE: CPT

## 2022-10-27 PROCEDURE — 93000 ELECTROCARDIOGRAM COMPLETE: CPT | Performed by: PHYSICIAN ASSISTANT

## 2022-10-27 PROCEDURE — 80306 DRUG TEST PRSMV INSTRMNT: CPT

## 2022-10-27 PROCEDURE — 80050 GENERAL HEALTH PANEL: CPT

## 2022-10-27 PROCEDURE — 80061 LIPID PANEL: CPT

## 2022-10-27 RX ORDER — LAMOTRIGINE 200 MG/1
1 TABLET ORAL DAILY
COMMUNITY
Start: 2022-09-08

## 2022-10-27 RX ORDER — GABAPENTIN 300 MG/1
300 CAPSULE ORAL 2 TIMES DAILY
Qty: 180 CAPSULE | Refills: 0 | Status: SHIPPED | OUTPATIENT
Start: 2022-10-27 | End: 2023-01-04

## 2022-10-27 NOTE — PROGRESS NOTES
Subjective   Eugenia Naik is a 52 y.o. female  Annual Exam (UTD on MMG, Pap, colorectal screening. RF gabapentin. Req flu shot. )      History of Present Illness  Patient is a pleasant 52-year-old white female who presents for preventive medical examination needs refill of gabapentin for fibromyalgia up-to-date on all mammogram Pap smear colorectal screening needs flu shot  The following portions of the patient's history were reviewed and updated as appropriate: allergies, current medications, past social history and problem list    Review of Systems   Constitutional: Negative.    HENT: Negative.    Eyes: Negative.    Respiratory: Negative.    Cardiovascular: Negative.    Gastrointestinal: Negative.    Endocrine: Negative.    Genitourinary: Negative.    Musculoskeletal: Negative.    Skin: Negative.    Allergic/Immunologic: Negative.    Neurological: Negative.    Hematological: Negative.    Psychiatric/Behavioral: Negative.    All other systems reviewed and are negative.      Objective     Vitals:    10/27/22 0959   BP: 124/78   Pulse: 58   Resp: 14   Temp: 97.1 °F (36.2 °C)   SpO2: 99%       Physical Exam  Vitals and nursing note reviewed.   Constitutional:       General: She is not in acute distress.     Appearance: Normal appearance. She is well-developed. She is not ill-appearing, toxic-appearing or diaphoretic.   HENT:      Head: Normocephalic and atraumatic.      Right Ear: External ear normal.      Left Ear: External ear normal.   Eyes:      Conjunctiva/sclera: Conjunctivae normal.      Pupils: Pupils are equal, round, and reactive to light.   Neck:      Thyroid: No thyromegaly.      Vascular: No carotid bruit or JVD.   Cardiovascular:      Rate and Rhythm: Normal rate and regular rhythm.      Pulses: Normal pulses.      Heart sounds: Normal heart sounds. No murmur heard.  Pulmonary:      Effort: Pulmonary effort is normal. No respiratory distress.      Breath sounds: Normal breath sounds.   Abdominal:       General: Bowel sounds are normal.      Palpations: Abdomen is soft. There is no mass.      Tenderness: There is no abdominal tenderness.   Musculoskeletal:         General: No swelling. Normal range of motion.      Cervical back: Normal range of motion and neck supple.   Lymphadenopathy:      Cervical: No cervical adenopathy.   Skin:     General: Skin is warm and dry.      Findings: No lesion or rash.   Neurological:      Mental Status: She is alert and oriented to person, place, and time.      Cranial Nerves: No cranial nerve deficit.      Sensory: No sensory deficit.      Motor: No weakness.      Coordination: Coordination normal.      Gait: Gait normal.      Deep Tendon Reflexes: Reflexes are normal and symmetric.   Psychiatric:         Mood and Affect: Mood normal.         Behavior: Behavior normal.         Thought Content: Thought content normal.         Judgment: Judgment normal.     Procedure     ECG 12 Lead    Date/Time: 10/27/2022 10:48 AM  Performed by: Jeff Montesinos PA  Authorized by: Jeff Montesinos PA   Comparison: not compared with previous ECG   Rhythm: sinus rhythm and sinus bradycardia  Conduction: conduction normal  ST Segments: ST segments normal    Clinical impression: normal ECG            Assessment & Plan     Diagnoses and all orders for this visit:    1. Annual physical exam (Primary)  -     Comprehensive Metabolic Panel; Future  -     Lipid Panel; Future  -     TSH; Future  -     CBC (No Diff); Future    2. Fibromyalgia  -     gabapentin (NEURONTIN) 300 MG capsule; Take 1 capsule by mouth 2 (Two) Times a Day.  Dispense: 180 capsule; Refill: 0    3. Encounter for therapeutic drug monitoring  -     Urine Drug Screen - Urine, Clean Catch; Future    Preventive medicine discussed, diet, exercise, healthy living discussed at length.  Discussed nutrition, physical activity, healthy weight, injury prevention, misuse of tobacco, alcohol and drugs, dental health, mental health,  immunizations, screening    Part of this note may be an electronic transcription/translation of spoken language to printed text using the Dragon Dictation System.

## 2022-11-10 RX ORDER — TIZANIDINE 4 MG/1
TABLET ORAL
Qty: 90 TABLET | Refills: 0 | Status: SHIPPED | OUTPATIENT
Start: 2022-11-10

## 2022-12-19 ENCOUNTER — TELEPHONE (OUTPATIENT)
Dept: FAMILY MEDICINE CLINIC | Facility: CLINIC | Age: 52
End: 2022-12-19

## 2022-12-19 NOTE — TELEPHONE ENCOUNTER
----- Message from Eugenia Naik sent at 12/19/2022 11:59 AM EST -----  Regarding: Gabapentin   Contact: [phone]  I have increased my gabapentin to 3 times a day. I was having a lot of afternoon pain. I hope that is okay. Also I had my bivalent COVID booster today

## 2023-01-03 DIAGNOSIS — M79.7 FIBROMYALGIA: ICD-10-CM

## 2023-01-04 RX ORDER — GABAPENTIN 300 MG/1
CAPSULE ORAL
Qty: 180 CAPSULE | Refills: 0 | Status: SHIPPED | OUTPATIENT
Start: 2023-01-04

## 2023-02-28 DIAGNOSIS — F41.8 DEPRESSION WITH ANXIETY: ICD-10-CM

## 2023-03-01 RX ORDER — DULOXETIN HYDROCHLORIDE 60 MG/1
CAPSULE, DELAYED RELEASE ORAL
Qty: 90 CAPSULE | Refills: 3 | Status: SHIPPED | OUTPATIENT
Start: 2023-03-01

## 2023-04-15 DIAGNOSIS — M79.7 FIBROMYALGIA: ICD-10-CM

## 2023-04-17 RX ORDER — GABAPENTIN 300 MG/1
300 CAPSULE ORAL 2 TIMES DAILY
Qty: 180 CAPSULE | Refills: 0 | Status: SHIPPED | OUTPATIENT
Start: 2023-04-17

## 2023-05-17 DIAGNOSIS — Z00.00 ANNUAL PHYSICAL EXAM: ICD-10-CM

## 2023-05-17 RX ORDER — ATORVASTATIN CALCIUM 10 MG/1
TABLET, FILM COATED ORAL
Qty: 90 TABLET | Refills: 3 | Status: SHIPPED | OUTPATIENT
Start: 2023-05-17

## 2023-05-25 ENCOUNTER — HOSPITAL ENCOUNTER (OUTPATIENT)
Dept: GENERAL RADIOLOGY | Facility: HOSPITAL | Age: 53
Discharge: HOME | End: 2023-05-25
Admitting: PHYSICIAN ASSISTANT
Payer: COMMERCIAL

## 2023-05-25 ENCOUNTER — OFFICE VISIT (OUTPATIENT)
Dept: FAMILY MEDICINE CLINIC | Facility: CLINIC | Age: 53
End: 2023-05-25
Payer: COMMERCIAL

## 2023-05-25 VITALS
DIASTOLIC BLOOD PRESSURE: 82 MMHG | OXYGEN SATURATION: 98 % | HEART RATE: 59 BPM | BODY MASS INDEX: 30.84 KG/M2 | SYSTOLIC BLOOD PRESSURE: 130 MMHG | HEIGHT: 66 IN

## 2023-05-25 DIAGNOSIS — M25.532 LEFT WRIST PAIN: ICD-10-CM

## 2023-05-25 DIAGNOSIS — M25.532 LEFT WRIST PAIN: Primary | ICD-10-CM

## 2023-05-25 PROCEDURE — 73100 X-RAY EXAM OF WRIST: CPT

## 2023-05-25 NOTE — PROGRESS NOTES
Subjective   Eugenia Naik is a 52 y.o. female  Wrist Pain (Lt wrist for several months. Has swelling around wrist, in fingers, some numbness, and dec ROM. Wearing a wrist brace and taking ibuprofen. No known injury.)      History of Present Illness  Patient is a pleasant 52-year-old white female who comes in [facility] left wrist pain denies any direct injury or trauma to the left wrist complains of decreased range of motion swelling edema pain with gripping objects.    The following portions of the patient's history were reviewed and updated as appropriate: allergies, current medications, past social history and problem list    Review of Systems   Constitutional: Negative for fatigue and unexpected weight change.   Respiratory: Negative for cough, chest tightness and shortness of breath.    Cardiovascular: Negative for chest pain, palpitations and leg swelling.   Gastrointestinal: Negative for nausea.   Musculoskeletal:        Wrist pain   Skin: Negative for color change and rash.   Neurological: Negative for dizziness, syncope, weakness and headaches.       Objective     Vitals:    05/25/23 1145   BP: 130/82   Pulse: 59   SpO2: 98%       Physical Exam  Vitals and nursing note reviewed.   Constitutional:       General: She is not in acute distress.     Appearance: Normal appearance. She is well-developed. She is not ill-appearing, toxic-appearing or diaphoretic.   Neck:      Vascular: No carotid bruit or JVD.   Cardiovascular:      Rate and Rhythm: Normal rate and regular rhythm.      Pulses: Normal pulses.      Heart sounds: Normal heart sounds. No murmur heard.  Pulmonary:      Effort: Pulmonary effort is normal. No respiratory distress.      Breath sounds: Normal breath sounds.   Abdominal:      Palpations: Abdomen is soft.      Tenderness: There is no abdominal tenderness.   Musculoskeletal:      Left wrist: Tenderness and crepitus present. Decreased range of motion.   Skin:     General: Skin is warm and dry.    Neurological:      Mental Status: She is alert.         Assessment & Plan     Diagnoses and all orders for this visit:    1. Left wrist pain (Primary)  -     XR Wrist 2 View Left; Future       Answers for HPI/ROS submitted by the patient on 5/24/2023  Please describe your symptoms.: Left wrist pain and swelling  Have you had these symptoms before?: No  How long have you been having these symptoms?: Greater than 2 weeks  Please list any medications you are currently taking for this condition.: Ibuprofen  What is the primary reason for your visit?: Other    I spent 15 minutes in patient care: Reviewing records prior to the visit, examining the patient, entering orders and documentation    Part of this note may be an electronic transcription/translation of spoken language to printed text using the Dragon Dictation System.

## 2023-06-01 DIAGNOSIS — M25.532 LEFT WRIST PAIN: Primary | ICD-10-CM

## 2023-07-29 DIAGNOSIS — M79.7 FIBROMYALGIA: ICD-10-CM

## 2023-07-31 RX ORDER — GABAPENTIN 300 MG/1
CAPSULE ORAL
Qty: 180 CAPSULE | Refills: 0 | Status: SHIPPED | OUTPATIENT
Start: 2023-07-31

## 2023-08-08 RX ORDER — PROPRANOLOL HYDROCHLORIDE 80 MG/1
CAPSULE, EXTENDED RELEASE ORAL
Qty: 90 CAPSULE | Refills: 3 | Status: SHIPPED | OUTPATIENT
Start: 2023-08-08

## 2023-11-02 ENCOUNTER — OFFICE VISIT (OUTPATIENT)
Dept: FAMILY MEDICINE CLINIC | Facility: CLINIC | Age: 53
End: 2023-11-02
Payer: COMMERCIAL

## 2023-11-02 VITALS
OXYGEN SATURATION: 99 % | HEIGHT: 66 IN | TEMPERATURE: 97.5 F | WEIGHT: 210 LBS | RESPIRATION RATE: 14 BRPM | SYSTOLIC BLOOD PRESSURE: 126 MMHG | BODY MASS INDEX: 33.75 KG/M2 | HEART RATE: 66 BPM | DIASTOLIC BLOOD PRESSURE: 80 MMHG

## 2023-11-02 DIAGNOSIS — K21.9 GASTROESOPHAGEAL REFLUX DISEASE WITHOUT ESOPHAGITIS: ICD-10-CM

## 2023-11-02 DIAGNOSIS — R60.9 EDEMA, UNSPECIFIED TYPE: ICD-10-CM

## 2023-11-02 DIAGNOSIS — R25.1 TREMORS OF NERVOUS SYSTEM: ICD-10-CM

## 2023-11-02 DIAGNOSIS — Z00.00 ROUTINE GENERAL MEDICAL EXAMINATION AT A HEALTH CARE FACILITY: Primary | ICD-10-CM

## 2023-11-02 PROCEDURE — 99396 PREV VISIT EST AGE 40-64: CPT | Performed by: PHYSICIAN ASSISTANT

## 2023-11-02 PROCEDURE — 93000 ELECTROCARDIOGRAM COMPLETE: CPT | Performed by: PHYSICIAN ASSISTANT

## 2023-11-02 RX ORDER — ESTRADIOL 0.1 MG/G
CREAM VAGINAL
COMMUNITY
Start: 2023-08-29

## 2023-11-02 RX ORDER — HYDROCHLOROTHIAZIDE 25 MG/1
25 TABLET ORAL DAILY
Qty: 30 TABLET | Refills: 1 | Status: SHIPPED | OUTPATIENT
Start: 2023-11-02

## 2023-11-02 RX ORDER — OMEPRAZOLE 40 MG/1
40 CAPSULE, DELAYED RELEASE ORAL DAILY
Qty: 30 CAPSULE | Refills: 1 | Status: SHIPPED | OUTPATIENT
Start: 2023-11-02

## 2023-11-02 NOTE — PROGRESS NOTES
And Subjective   Eugenia Naik is a 53 y.o. female  Annual Exam (UTD on colonoscopy (due in 2024), MMG, UTD on vaccines. Needs lab orders printed-will get them done at ), Edema, Hip Pain (Bilateral hips, has been seen chiro with some relief), and Weight Gain (Has gained 10# since May)      History of Present Illness  Patient is a pleasant 53-year-old female who comes in for preventive medical examination up-to-date on vaccines mammogram the lab work patient has edema in both lower extremities some bilateral hip pain sees chiropractor because weight gain fatigue also has worsening tremors would like to see neurology.  Patient have GERD symptoms  The following portions of the patient's history were reviewed and updated as appropriate: allergies, current medications, past social history and problem list    Review of Systems   Constitutional: Negative.  Negative for fatigue and unexpected weight change.   HENT: Negative.     Eyes: Negative.    Respiratory: Negative.  Negative for cough, chest tightness and shortness of breath.    Cardiovascular: Negative.  Negative for chest pain, palpitations and leg swelling.   Gastrointestinal: Negative.  Negative for nausea.   Endocrine: Negative.    Genitourinary: Negative.    Musculoskeletal: Negative.    Skin: Negative.  Negative for color change and rash.   Allergic/Immunologic: Negative.    Neurological: Negative.  Positive for tremors. Negative for dizziness, syncope, weakness and headaches.   Hematological: Negative.    Psychiatric/Behavioral: Negative.     All other systems reviewed and are negative.      Objective     Vitals:    11/02/23 1004   BP: 126/80   Pulse: 66   Resp: 14   Temp: 97.5 °F (36.4 °C)   SpO2: 99%       Physical Exam  Vitals and nursing note reviewed.   Constitutional:       General: She is not in acute distress.     Appearance: Normal appearance. She is well-developed. She is not ill-appearing, toxic-appearing or diaphoretic.   HENT:      Head:  Normocephalic and atraumatic.      Right Ear: External ear normal.      Left Ear: External ear normal.   Eyes:      Conjunctiva/sclera: Conjunctivae normal.      Pupils: Pupils are equal, round, and reactive to light.   Neck:      Thyroid: No thyromegaly.      Vascular: No carotid bruit or JVD.   Cardiovascular:      Rate and Rhythm: Normal rate and regular rhythm.      Pulses: Normal pulses.      Heart sounds: Normal heart sounds. No murmur heard.  Pulmonary:      Effort: Pulmonary effort is normal. No respiratory distress.      Breath sounds: Normal breath sounds.   Abdominal:      General: Bowel sounds are normal.      Palpations: Abdomen is soft. There is no mass.      Tenderness: There is no abdominal tenderness.   Musculoskeletal:         General: No swelling. Normal range of motion.      Cervical back: Normal range of motion and neck supple.   Lymphadenopathy:      Cervical: No cervical adenopathy.   Skin:     General: Skin is warm and dry.      Findings: No lesion or rash.   Neurological:      Mental Status: She is alert and oriented to person, place, and time.      Cranial Nerves: No cranial nerve deficit.      Sensory: No sensory deficit.      Motor: No weakness.      Coordination: Coordination normal.      Gait: Gait normal.      Deep Tendon Reflexes: Reflexes are normal and symmetric.   Psychiatric:         Mood and Affect: Mood normal.         Behavior: Behavior normal.         Thought Content: Thought content normal.         Judgment: Judgment normal.       ECG 12 Lead    Date/Time: 11/2/2023 12:58 PM  Performed by: Jeff Montesinos PA    Authorized by: Jeff Montesinos PA  Comparison: not compared with previous ECG   Rhythm: sinus rhythm  Rate: normal  ST Segments: ST segments normal  QRS axis: normal    Clinical impression: normal ECG           Assessment & Plan     Diagnoses and all orders for this visit:    1. Routine general medical examination at a health care facility (Primary)  -      Comprehensive Metabolic Panel; Future  -     Lipid Panel; Future  -     TSH; Future  -     CBC (No Diff); Future    2. Tremors of nervous system  -     Ambulatory Referral to Neurology    3. Gastroesophageal reflux disease without esophagitis  -     omeprazole (priLOSEC) 40 MG capsule; Take 1 capsule by mouth Daily.  Dispense: 30 capsule; Refill: 1    4. Edema, unspecified type  -     hydroCHLOROthiazide (HYDRODIURIL) 25 MG tablet; Take 1 tablet by mouth Daily.  Dispense: 30 tablet; Refill: 1     Preventive medicine discussed, diet, exercise, healthy living discussed at length.  Discussed nutrition, physical activity, healthy weight, injury prevention, misuse of tobacco, alcohol and drugs, dental health, mental health, immunizations, screening    Part of this note may be an electronic transcription/translation of spoken language to printed text using the Dragon Dictation System.

## 2023-11-03 DIAGNOSIS — M79.7 FIBROMYALGIA: ICD-10-CM

## 2023-11-03 RX ORDER — NITROFURANTOIN 25; 75 MG/1; MG/1
100 CAPSULE ORAL 2 TIMES DAILY
Qty: 14 CAPSULE | Refills: 0 | Status: SHIPPED | OUTPATIENT
Start: 2023-11-03 | End: 2023-11-03 | Stop reason: SDUPTHER

## 2023-11-03 RX ORDER — GABAPENTIN 300 MG/1
300 CAPSULE ORAL 2 TIMES DAILY
Qty: 180 CAPSULE | Refills: 0 | Status: SHIPPED | OUTPATIENT
Start: 2023-11-03

## 2023-11-03 RX ORDER — NITROFURANTOIN 25; 75 MG/1; MG/1
100 CAPSULE ORAL 2 TIMES DAILY
Qty: 14 CAPSULE | Refills: 0 | Status: SHIPPED | OUTPATIENT
Start: 2023-11-03

## 2023-11-03 RX ORDER — GABAPENTIN 300 MG/1
CAPSULE ORAL
Qty: 180 CAPSULE | Refills: 0 | OUTPATIENT
Start: 2023-11-03

## 2024-01-05 DIAGNOSIS — K21.9 CHRONIC GERD: Primary | ICD-10-CM

## 2024-01-29 ENCOUNTER — TELEPHONE (OUTPATIENT)
Dept: FAMILY MEDICINE CLINIC | Facility: CLINIC | Age: 54
End: 2024-01-29
Payer: COMMERCIAL

## 2024-01-29 NOTE — TELEPHONE ENCOUNTER
Have sent this to [facility] for Dr. Castro. If she wants she can call [phone] to see about getting scheduled.

## 2024-01-29 NOTE — TELEPHONE ENCOUNTER
----- Message from Eugenia Naik sent at 1/28/2024 10:26 AM EST -----  Regarding: Egd  Contact: [phone]  I have waited almost a month to schedule an appointment for the egd and have yet to receive a call.  I looked through my insurance plan and realized that [facility] is In network. I have seen Dr Castro for my Colonoscopy and other abd issues. Would it be possible to make the referral to him?  I am so sorry for the inconvenience.   I have lost 10lbs since the middle of December without trying. I just hit a wall and can’t eat more. Just recently I seem to be having more bouts of the pain.   Thanks  Eugenia

## 2024-02-14 DIAGNOSIS — M79.7 FIBROMYALGIA: ICD-10-CM

## 2024-02-15 RX ORDER — GABAPENTIN 300 MG/1
300 CAPSULE ORAL 2 TIMES DAILY
Qty: 180 CAPSULE | Refills: 0 | Status: SHIPPED | OUTPATIENT
Start: 2024-02-15

## 2024-02-19 DIAGNOSIS — F41.8 DEPRESSION WITH ANXIETY: ICD-10-CM

## 2024-02-19 RX ORDER — DULOXETIN HYDROCHLORIDE 60 MG/1
CAPSULE, DELAYED RELEASE ORAL
Qty: 90 CAPSULE | Refills: 3 | Status: SHIPPED | OUTPATIENT
Start: 2024-02-19

## 2024-02-22 DIAGNOSIS — K21.9 GASTROESOPHAGEAL REFLUX DISEASE WITHOUT ESOPHAGITIS: ICD-10-CM

## 2024-02-22 RX ORDER — OMEPRAZOLE 40 MG/1
40 CAPSULE, DELAYED RELEASE ORAL DAILY
Qty: 90 CAPSULE | Refills: 3 | Status: SHIPPED | OUTPATIENT
Start: 2024-02-22

## 2024-04-03 RX ORDER — SODIUM PICOSULFATE, MAGNESIUM OXIDE, AND ANHYDROUS CITRIC ACID 10; 3.5; 12 MG/160ML; G/160ML; G/160ML
350 LIQUID ORAL TAKE AS DIRECTED
Qty: 350 ML | Refills: 0 | Status: SHIPPED | OUTPATIENT
Start: 2024-04-03

## 2024-04-10 ENCOUNTER — OUTSIDE FACILITY SERVICE (OUTPATIENT)
Dept: GASTROENTEROLOGY | Facility: CLINIC | Age: 54
End: 2024-04-10
Payer: COMMERCIAL

## 2024-04-10 PROCEDURE — 43239 EGD BIOPSY SINGLE/MULTIPLE: CPT | Performed by: INTERNAL MEDICINE

## 2024-04-10 PROCEDURE — 88305 TISSUE EXAM BY PATHOLOGIST: CPT | Performed by: INTERNAL MEDICINE

## 2024-04-10 PROCEDURE — 45380 COLONOSCOPY AND BIOPSY: CPT | Performed by: INTERNAL MEDICINE

## 2024-04-10 PROCEDURE — 45385 COLONOSCOPY W/LESION REMOVAL: CPT | Performed by: INTERNAL MEDICINE

## 2024-04-10 PROCEDURE — 43450 DILATE ESOPHAGUS 1/MULT PASS: CPT | Performed by: INTERNAL MEDICINE

## 2024-04-11 ENCOUNTER — LAB REQUISITION (OUTPATIENT)
Dept: LAB | Facility: HOSPITAL | Age: 54
End: 2024-04-11
Payer: COMMERCIAL

## 2024-04-11 DIAGNOSIS — R10.9 UNSPECIFIED ABDOMINAL PAIN: ICD-10-CM

## 2024-04-11 DIAGNOSIS — D12.5 BENIGN NEOPLASM OF SIGMOID COLON: ICD-10-CM

## 2024-04-11 DIAGNOSIS — Q39.9 CONGENITAL MALFORMATION OF ESOPHAGUS, UNSPECIFIED: ICD-10-CM

## 2024-04-11 DIAGNOSIS — K64.8 OTHER HEMORRHOIDS: ICD-10-CM

## 2024-04-11 DIAGNOSIS — R13.10 DYSPHAGIA, UNSPECIFIED: ICD-10-CM

## 2024-04-11 DIAGNOSIS — Z12.11 ENCOUNTER FOR SCREENING FOR MALIGNANT NEOPLASM OF COLON: ICD-10-CM

## 2024-04-11 DIAGNOSIS — K31.89 OTHER DISEASES OF STOMACH AND DUODENUM: ICD-10-CM

## 2024-04-15 DIAGNOSIS — R60.9 EDEMA, UNSPECIFIED TYPE: ICD-10-CM

## 2024-04-15 LAB — REF LAB TEST METHOD: NORMAL

## 2024-04-16 RX ORDER — HYDROCHLOROTHIAZIDE 25 MG/1
25 TABLET ORAL DAILY
Qty: 30 TABLET | Refills: 11 | Status: SHIPPED | OUTPATIENT
Start: 2024-04-16

## 2024-05-13 DIAGNOSIS — Z00.00 ANNUAL PHYSICAL EXAM: ICD-10-CM

## 2024-05-13 RX ORDER — ATORVASTATIN CALCIUM 10 MG/1
TABLET, FILM COATED ORAL
Qty: 90 TABLET | Refills: 0 | Status: SHIPPED | OUTPATIENT
Start: 2024-05-13

## 2024-05-27 DIAGNOSIS — M79.7 FIBROMYALGIA: ICD-10-CM

## 2024-05-30 RX ORDER — GABAPENTIN 300 MG/1
300 CAPSULE ORAL 2 TIMES DAILY
Qty: 180 CAPSULE | Refills: 0 | OUTPATIENT
Start: 2024-05-30

## 2024-06-05 ENCOUNTER — OFFICE VISIT (OUTPATIENT)
Dept: FAMILY MEDICINE CLINIC | Facility: CLINIC | Age: 54
End: 2024-06-05
Payer: COMMERCIAL

## 2024-06-05 VITALS
SYSTOLIC BLOOD PRESSURE: 122 MMHG | HEIGHT: 66 IN | DIASTOLIC BLOOD PRESSURE: 82 MMHG | BODY MASS INDEX: 28.96 KG/M2 | OXYGEN SATURATION: 98 % | WEIGHT: 180.2 LBS | HEART RATE: 65 BPM

## 2024-06-05 DIAGNOSIS — R42 DIZZINESS: ICD-10-CM

## 2024-06-05 DIAGNOSIS — R55 SYNCOPE, UNSPECIFIED SYNCOPE TYPE: ICD-10-CM

## 2024-06-05 DIAGNOSIS — M79.7 FIBROMYALGIA: ICD-10-CM

## 2024-06-05 DIAGNOSIS — R07.89 CHEST TIGHTNESS: Primary | ICD-10-CM

## 2024-06-05 DIAGNOSIS — T67.1XXS HEAT SYNCOPE, SEQUELA: ICD-10-CM

## 2024-06-05 DIAGNOSIS — R06.09 DYSPNEA ON EXERTION: ICD-10-CM

## 2024-06-05 DIAGNOSIS — R00.2 PALPITATIONS: ICD-10-CM

## 2024-06-05 DIAGNOSIS — Z51.81 MEDICATION MONITORING ENCOUNTER: ICD-10-CM

## 2024-06-05 PROCEDURE — 99214 OFFICE O/P EST MOD 30 MIN: CPT | Performed by: INTERNAL MEDICINE

## 2024-06-05 RX ORDER — GABAPENTIN 300 MG/1
300 CAPSULE ORAL 2 TIMES DAILY
Qty: 180 CAPSULE | Refills: 0 | Status: CANCELLED | OUTPATIENT
Start: 2024-06-05

## 2024-06-05 RX ORDER — GABAPENTIN 100 MG/1
200 CAPSULE ORAL 2 TIMES DAILY
Qty: 360 CAPSULE | Refills: 2 | Status: SHIPPED | OUTPATIENT
Start: 2024-06-05

## 2024-06-05 NOTE — PROGRESS NOTES
Eugenia Naik  1970  [id number]  Patient Care Team:  Dario Lee MD as PCP - General (Internal Medicine)    Eugenia Naik is a 54 y.o. female here today to establish care.  This patient is accompanied by their self who contributes to the history of their care.    Chief Complaint:    Chief Complaint   Patient presents with    Fibromyalgia    Dizziness     Not like vertigo          History of Present Illness:     She carries a diagnosis of fibromyalgia and is frustrated with her care to date. She is currently on Cymbalta and gabapentin. She feels her fibromyalgia is controlled with these medication. She see chiropractor and swims 4-5 days per week for an hour.  She does have a family history of RA, nephew with chrons- and scleroderma also runs in her family. She did get evaluated by rheumatology with negative evaluation. Sx began in 2018. She is seeing a neurologist for her essential tremor. She currently is on gabapentin 300 mg po bid. Tried going down to nightly with recurrence of her pain. Currently on Topamax for her tremors ( [facility]). She reports dizziness-instability with movement. It is random- this has been stable for years. Described being pulled to a direction with taking stp. She will tilt her head back with reproduction of her dizziness. These symptoms resolve after a minute with resuming upright position of her head. Sh eis willing to try PT. She remains on lamictal for depresson- she currently takes this since 2018. She remarks it really helped her depression.   She experiences palpitations, chest tightness and dyspnea with walking up stairs- she has a history of syncope in the shower. Her swimming is not vigoirus. Resting HR 50 but with her pool exercises elevates to 120's despite propranolol. Syncope was 1 year.   Past Medical History:   Diagnosis Date    Acid reflux     Acute maxillary sinusitis     Anxiety and depression     Arthralgia     Arthritis     knee and foot    Colicky  RUQ abdominal pain     Constipation     Depression     Dizziness and giddiness     Essential tremor     propranolol for this    Fibromyalgia, primary     GERD (gastroesophageal reflux disease)     High cholesterol     HPV in female     Lumbago     Metatarsalgia, right foot     PONV (postoperative nausea and vomiting)     Pustule     Spinal headache     with csection    Tremor     Wears reading eyeglasses        Past Surgical History:   Procedure Laterality Date    ADENOIDECTOMY       SECTION      CHOLECYSTECTOMY      COLONOSCOPY  2018    ENDOMETRIAL ABLATION      KNEE ARTHROSCOPY Right 2018    Procedure: KNEE ARTHROSCOPY RIGHT;  Surgeon: Steve Mauro MD;  Location:  [facility];  Service: Orthopedics    TONSILLECTOMY      TOTAL LAPAROSCOPIC HYSTERECTOMY N/A 2018    Procedure: TOTAL LAPAROSCOPIC HYSTERECTOMY WITH DAVINCI WITH BILATERAL SALPINGO OOPHERECTOMY, SKIN TAG REMOVAL FROM INNER LABIA MINORA, CYSTO;  Surgeon: Renetta Artis MD;  Location:  [facility];  Service: Obstetrics/Gynecology        Family History   Problem Relation Age of Onset    Stroke Mother     Heart disease Mother     Arthritis Mother     Cancer Other     Heart disease Other     Depression Other     Heart attack Other     Depression Child     Anesthesia problems Other     Rheum arthritis Other     Hypertension Other     Hyperlipidemia Daughter        Social History     Socioeconomic History    Marital status:    Tobacco Use    Smoking status: Never    Smokeless tobacco: Never   Vaping Use    Vaping status: Never Used   Substance and Sexual Activity    Alcohol use: Yes     Alcohol/week: 4.0 standard drinks of alcohol     Types: 3 Glasses of wine, 1 Cans of beer per week     Comment: Occ    Drug use: No    Sexual activity: Yes     Partners: Male     Birth control/protection: Hysterectomy, Surgical       No Known Allergies    Review of Systems:    Review of Systems   Constitutional:  Positive for fatigue.  "  Respiratory:  Positive for chest tightness and shortness of breath.    Cardiovascular:  Positive for chest pain and palpitations.        Occasional palpitations-   Musculoskeletal:  Positive for myalgias.   Neurological:  Positive for tremors, syncope and light-headedness.       Vitals:    06/05/24 1256   BP: 122/82   Pulse: 65   SpO2: 98%   Weight: 81.7 kg (180 lb 3.2 oz)   Height: 167.6 cm (65.98\")     Body mass index is 29.1 kg/m².      Current Outpatient Medications:     atorvastatin (LIPITOR) 10 MG tablet, TAKE 1 TABLET DAILY, Disp: 90 tablet, Rfl: 0    busPIRone (BUSPAR) 15 MG tablet, Take 1 tablet by mouth 2 (Two) Times a Day., Disp: , Rfl:     DULoxetine (CYMBALTA) 60 MG capsule, TAKE 1 CAPSULE DAILY, Disp: 90 capsule, Rfl: 3    estradiol (ESTRACE) 0.1 MG/GM vaginal cream, , Disp: , Rfl:     FOLIC ACID PO, Take  by mouth Daily., Disp: , Rfl:     gabapentin (NEURONTIN) 300 MG capsule, TAKE 1 CAPSULE TWICE A DAY, Disp: 180 capsule, Rfl: 0    hydroCHLOROthiazide 25 MG tablet, TAKE 1 TABLET DAILY (Patient taking differently: Take 0.5 tablets by mouth Daily.), Disp: 30 tablet, Rfl: 11    lamoTRIgine (LaMICtal) 200 MG tablet, Take 1 tablet by mouth Daily., Disp: , Rfl:     omeprazole (priLOSEC) 40 MG capsule, Take 1 capsule by mouth Daily., Disp: 90 capsule, Rfl: 3    propranolol LA (INDERAL LA) 80 MG 24 hr capsule, TAKE 1 CAPSULE DAILY, Disp: 90 capsule, Rfl: 3    topiramate (TOPAMAX) 50 MG tablet, Take 1.5 tablets by mouth., Disp: , Rfl:     traZODone (DESYREL) 100 MG tablet, Take 0.5 - 1 tablet by mouth At Night As Needed for sleep, Disp: 30 tablet, Rfl: 2    gabapentin (NEURONTIN) 100 MG capsule, Take 2 capsules by mouth 2 (Two) Times a Day., Disp: 360 capsule, Rfl: 2    Physical Exam:    Physical Exam  Vitals and nursing note reviewed.   Constitutional:       General: She is not in acute distress.     Appearance: She is well-developed. She is not diaphoretic.   HENT:      Head: Normocephalic and atraumatic. "      Right Ear: External ear normal.      Left Ear: External ear normal.      Mouth/Throat:      Pharynx: No oropharyngeal exudate.   Eyes:      General: No scleral icterus.        Right eye: No discharge.      Conjunctiva/sclera: Conjunctivae normal.   Neck:      Thyroid: No thyromegaly.      Vascular: No JVD.      Trachea: No tracheal deviation.   Cardiovascular:      Rate and Rhythm: Normal rate and regular rhythm.      Heart sounds: Normal heart sounds.      Comments: PMI nondisplaced  Pulmonary:      Effort: Pulmonary effort is normal.      Breath sounds: Normal breath sounds. No wheezing or rales.   Abdominal:      General: Bowel sounds are normal.      Palpations: Abdomen is soft.      Tenderness: There is no abdominal tenderness. There is no guarding or rebound.   Musculoskeletal:      Cervical back: Normal range of motion and neck supple.   Lymphadenopathy:      Cervical: No cervical adenopathy.   Skin:     General: Skin is warm and dry.      Capillary Refill: Capillary refill takes less than 2 seconds.      Coloration: Skin is not pale.      Findings: No rash.   Neurological:      Mental Status: She is alert and oriented to person, place, and time.      Motor: No abnormal muscle tone.      Coordination: Coordination normal.   Psychiatric:         Judgment: Judgment normal.         Procedures    Results Review:    I reviewed the patient's new clinical results.    Assessment/Plan:    Problem List Items Addressed This Visit    None  Visit Diagnoses       Chest tightness    -  Primary    Relevant Orders    Ambulatory Referral to [facility] [place] - EMELY    Fibromyalgia        Relevant Medications    gabapentin (NEURONTIN) 100 MG capsule    Dyspnea on exertion        Relevant Orders    Ambulatory Referral to [facility] [place] - EMELY    Heat syncope, sequela        Syncope, unspecified syncope type        Relevant Orders    Ambulatory Referral to [facility] [place] -  EMELY    Palpitations        Relevant Orders    Ambulatory Referral to [facility] [place] - EMELY    Dizziness        Relevant Orders    Ambulatory Referral to Physical Therapy        Syncope, chest tightness syncope last year and dyspnea on exertion needs evaluation recommend possible ischemic workup at minimum cardiac monitor.  Patient is in agreement.  Her fibromyalgia seems well compensated she would like to diminish her gabapentin have placed her on 200 mg twice daily.  If symptoms flare we can go back up to 300 twice daily.  Requested urine drug screen, [place] was [place].    Not convinced her diseases is not in her ear.  I made a referral to physical therapy for vestibular rehab.  Also consider vertebrobasilar insufficiency.    Plan of care reviewed with patient at the conclusion of today's visit. Education was provided regarding diagnosis and management.  Patient verbalizes understanding of and agreement with management plan.    Return in about 5 months (around 11/5/2024) for Annual.    Dario Lee MD      Please note than portions of this note were completed [id number] a Voice Recognition Program

## 2024-06-06 ENCOUNTER — PATIENT ROUNDING (BHMG ONLY) (OUTPATIENT)
Dept: FAMILY MEDICINE CLINIC | Facility: CLINIC | Age: 54
End: 2024-06-06
Payer: COMMERCIAL

## 2024-06-06 NOTE — PROGRESS NOTES
"[facility]  Heart and Valve Center    Chief Complaint  Palpitations, Syncope, and Chest Pain    Subjective    History of Present Illness {CC  Problem List  Visit  Diagnosis   Encounters  Notes  Medications  Labs  Result Review Imaging  Media :23}     Eugenia Naik is a 54 y.o. female with fibromyalgia, hyperlipidemia who presents today for evaluation of palpitations, chest tightness and dyspnea at the request of Dr. Lee.    Recently has noticed some exertional chest tightness, shortness of breath and leg weakness. Also happens when she works in the yard. She swims 4-5 times a week and feels she gets short of breath easily. She reports over the past few years she has has noticed she is sensitive to heat and did pass out once in the shower. She does have orthostatic lightheadedness. She also noticed some worsening dizziness if she moves her head    Reports having heart palpitations in the past and wore a monitor when she was young. Still has some intermittent palpitations but not often.       Mother has AF (60s)  and HF    Drinks plenty of water (80 oz+)     Cardiac risks: hyperlipidemia, age    Objective     Vital Signs:   Vitals:    06/10/24 1414 06/10/24 1415   BP: 127/81 145/87   BP Location: Left arm Left arm   Patient Position: Sitting Standing   Pulse: 58 61   SpO2: 98%    Weight: 78.9 kg (174 lb)    Height: 165.1 cm (65\")      Body mass index is 28.96 kg/m².  Physical Exam  Vitals reviewed.   Constitutional:       Appearance: Normal appearance.   HENT:      Head: Normocephalic.   Neck:      Vascular: No carotid bruit.   Cardiovascular:      Rate and Rhythm: Normal rate and regular rhythm.      Pulses: Normal pulses.      Heart sounds: Normal heart sounds, S1 normal and S2 normal. No murmur heard.  Pulmonary:      Effort: Pulmonary effort is normal. No respiratory distress.      Breath sounds: Normal breath sounds.   Chest:      Chest wall: No tenderness.   Abdominal:      " General: Abdomen is flat.      Palpations: Abdomen is soft.   Musculoskeletal:      Cervical back: Neck supple.      Right lower leg: No edema.   Skin:     General: Skin is warm and dry.   Neurological:      General: No focal deficit present.      Mental Status: She is alert and oriented to person, place, and time. Mental status is at baseline.   Psychiatric:         Mood and Affect: Mood normal.         Behavior: Behavior normal.         Thought Content: Thought content normal.              Data Reviewed:{ Labs  Result Review  Imaging  Med Tab  Media :23}   LIPID PANEL W/ CHOL/HDL RATIO (11/02/2023 11:51)  TSH (11/02/2023 11:51)  CBC (NO DIFF) (11/02/2023 11:51)  COMPREHENSIVE METABOLIC PANEL (11/02/2023 11:51)  ECG 12 Lead (11/02/2023 12:58)          Assessment and Plan {CC Problem List  Visit Diagnosis  ROS  Review (Popup)  Health Maintenance  Quality  BestPractice  Medications  SmartSets  SnapShot Encounters  Media :23}   1. Chest pain, unspecified type  - New exertional chest tightness, needs ischemic evaluation  - ECG 12 Lead; Future  - Stress Test With Myocardial Perfusion (1 Day); Future  - Adult Transthoracic Echo Complete W/ Cont if Necessary Per Protocol; Future    2. TORRES (dyspnea on exertion)    - Stress Test With Myocardial Perfusion (1 Day); Future  - Adult Transthoracic Echo Complete W/ Cont if Necessary Per Protocol; Future    3. Dizziness  - She has some mixed symptoms which could be inner ear and orthostatic hypotension.  Continue adequate fluid intake.  She is on low-dose hydrochlorothiazide, but needs this due to lower extremity edema  - Stress Test With Myocardial Perfusion (1 Day); Future  - Adult Transthoracic Echo Complete W/ Cont if Necessary Per Protocol; Future  - Holter Monitor - 72 Hour Up To 15 Days; Future    4. Palpitations    - Stress Test With Myocardial Perfusion (1 Day); Future  - Adult Transthoracic Echo Complete W/ Cont if Necessary Per Protocol; Future  - Holter  Monitor - 72 Hour Up To 15 Days; Future          Follow Up {Instructions Charge Capture  Follow-up Communications :23}   Return in about 4 weeks (around 7/8/2024) for Monitor results, Video Visit.    Patient was given instructions and counseling regarding her condition or for health maintenance advice. Please see specific information pulled into the AVS if appropriate.  Advised to call the Heart and Valve Center with any questions, concerns, or worsening symptoms.

## 2024-06-10 ENCOUNTER — HOSPITAL ENCOUNTER (OUTPATIENT)
Dept: CARDIOLOGY | Facility: HOSPITAL | Age: 54
Discharge: HOME | End: 2024-06-10
Payer: COMMERCIAL

## 2024-06-10 ENCOUNTER — OFFICE VISIT (OUTPATIENT)
Dept: CARDIOLOGY | Facility: HOSPITAL | Age: 54
End: 2024-06-10
Payer: COMMERCIAL

## 2024-06-10 VITALS
SYSTOLIC BLOOD PRESSURE: 145 MMHG | HEART RATE: 61 BPM | BODY MASS INDEX: 28.99 KG/M2 | HEIGHT: 65 IN | DIASTOLIC BLOOD PRESSURE: 87 MMHG | OXYGEN SATURATION: 98 % | WEIGHT: 174 LBS

## 2024-06-10 DIAGNOSIS — R42 DIZZINESS: ICD-10-CM

## 2024-06-10 DIAGNOSIS — R06.09 DOE (DYSPNEA ON EXERTION): ICD-10-CM

## 2024-06-10 DIAGNOSIS — R07.9 CHEST PAIN, UNSPECIFIED TYPE: ICD-10-CM

## 2024-06-10 DIAGNOSIS — R00.2 PALPITATIONS: ICD-10-CM

## 2024-06-10 DIAGNOSIS — R07.9 CHEST PAIN, UNSPECIFIED TYPE: Primary | ICD-10-CM

## 2024-06-10 PROCEDURE — 93005 ELECTROCARDIOGRAM TRACING: CPT | Performed by: NURSE PRACTITIONER

## 2024-06-10 PROCEDURE — 99214 OFFICE O/P EST MOD 30 MIN: CPT | Performed by: NURSE PRACTITIONER

## 2024-06-10 NOTE — PROGRESS NOTES
[facility] Heart Monitor Documentation    Eugenia Naik  1970  [id number]  06/10/24      [] ZIO XT Patch  Model [id number] Prescribed for  Days    Serial Number: (N + 9 Digits) N   Apply-By Date on Box:   USPS Tracking Number:   USPS Tracking        [] [employer] MINI PLUS Mobile Cardiac Telemetry  Model BGMINIPLUS Prescribed for  Days    Serial Number: (BGM + 7 Digits) BGM  Shipped-By Date on Box:   UPS Tracking Number: [id number]  UPS Tracking      [x] [employer] MINI Holter Monitor  Model BGMINIEL Prescribed for 14 Days    Serial Number: (7 Digits) MAILED  Shipped-By Date on Box: MAILED  UPS Tracking Number: [id number]  UPS Tracking      MAILED    Alesia Skinner MA, 14:51 EDT, 06/10/24                  [facility]

## 2024-06-11 ENCOUNTER — HOSPITAL ENCOUNTER (OUTPATIENT)
Dept: CARDIOLOGY | Facility: HOSPITAL | Age: 54
Discharge: HOME | End: 2024-06-11
Admitting: NURSE PRACTITIONER
Payer: COMMERCIAL

## 2024-06-11 VITALS
SYSTOLIC BLOOD PRESSURE: 113 MMHG | DIASTOLIC BLOOD PRESSURE: 73 MMHG | WEIGHT: 174 LBS | BODY MASS INDEX: 28.99 KG/M2 | HEIGHT: 65 IN

## 2024-06-11 DIAGNOSIS — R42 DIZZINESS: ICD-10-CM

## 2024-06-11 DIAGNOSIS — R06.09 DOE (DYSPNEA ON EXERTION): ICD-10-CM

## 2024-06-11 DIAGNOSIS — R00.2 PALPITATIONS: ICD-10-CM

## 2024-06-11 DIAGNOSIS — R07.9 CHEST PAIN, UNSPECIFIED TYPE: ICD-10-CM

## 2024-06-11 LAB
BH CV ECHO MEAS - AO MAX PG: 4.6 MMHG
BH CV ECHO MEAS - AO ROOT DIAM: 2.9 CM
BH CV ECHO MEAS - AO V2 MAX: 107.5 CM/SEC
BH CV ECHO MEAS - EF(MOD-BP): 59 %
BH CV ECHO MEAS - IVS/LVPW: 1 CM
BH CV ECHO MEAS - IVSD: 1 CM
BH CV ECHO MEAS - LA DIMENSION: 3.3 CM
BH CV ECHO MEAS - LV MAX PG: 2.7 MMHG
BH CV ECHO MEAS - LV MEAN PG: 1.17 MMHG
BH CV ECHO MEAS - LV V1 MAX: 81.9 CM/SEC
BH CV ECHO MEAS - LV V1 VTI: 18.3 CM
BH CV ECHO MEAS - LVIDD: 4 CM
BH CV ECHO MEAS - LVIDS: 2.5 CM
BH CV ECHO MEAS - LVPWD: 1 CM
BH CV ECHO MEAS - MV A MAX VEL: 69.5 CM/SEC
BH CV ECHO MEAS - MV DEC SLOPE: 277.2 CM/SEC2
BH CV ECHO MEAS - MV DEC TIME: 330 SEC
BH CV ECHO MEAS - MV E MAX VEL: 56.3 CM/SEC
BH CV ECHO MEAS - MV E/A: 0.81
BH CV ECHO MEAS - MV MAX PG: 2.5 MMHG
BH CV ECHO MEAS - MV MEAN PG: 1 MMHG
BH CV ECHO MEAS - MV P1/2T: 74 MSEC
BH CV ECHO MEAS - MV V2 VTI: 21.4 CM
BH CV ECHO MEAS - MVA(P1/2T): 3 CM2
BH CV ECHO MEAS - PA ACC TIME: 0.13 SEC
BH CV ECHO MEAS - PA V2 MAX: 71.9 CM/SEC
BH CV ECHO MEAS - RAP SYSTOLE: 3 MMHG
BH CV ECHO MEAS - RVSP: 19 MMHG
BH CV ECHO MEAS - TAPSE (>1.6): 1.76 CM
BH CV ECHO MEAS - TR MAX PG: 16 MMHG
BH CV ECHO MEAS - TR MAX VEL: 196.3 CM/SEC
BH CV XLRA - RV BASE: 3.37 CM
BH CV XLRA - RV LENGTH: 7.01 CM
BH CV XLRA - RV MID: 2.76 CM
QT INTERVAL: 418 MS
QTC INTERVAL: 410 MS

## 2024-06-11 PROCEDURE — 93306 TTE W/DOPPLER COMPLETE: CPT

## 2024-06-11 PROCEDURE — 93306 TTE W/DOPPLER COMPLETE: CPT | Performed by: INTERNAL MEDICINE

## 2024-06-13 DIAGNOSIS — R53.83 OTHER FATIGUE: Primary | ICD-10-CM

## 2024-06-14 ENCOUNTER — LAB (OUTPATIENT)
Dept: LAB | Facility: HOSPITAL | Age: 54
End: 2024-06-14
Payer: COMMERCIAL

## 2024-06-14 DIAGNOSIS — R53.83 OTHER FATIGUE: ICD-10-CM

## 2024-06-14 LAB
DEPRECATED RDW RBC AUTO: 40.9 FL (ref 37–54)
DRUGS UR: NORMAL
ERYTHROCYTE [DISTWIDTH] IN BLOOD BY AUTOMATED COUNT: 12.5 % (ref 12.3–15.4)
HCT VFR BLD AUTO: 40.8 % (ref 34–46.6)
HGB BLD-MCNC: 13.7 G/DL (ref 12–15.9)
IRON 24H UR-MRATE: 84 MCG/DL (ref 37–145)
IRON SATN MFR SERPL: 24 % (ref 20–50)
MCH RBC QN AUTO: 30.6 PG (ref 26.6–33)
MCHC RBC AUTO-ENTMCNC: 33.6 G/DL (ref 31.5–35.7)
MCV RBC AUTO: 91.1 FL (ref 79–97)
PLATELET # BLD AUTO: 245 10*3/MM3 (ref 140–450)
PMV BLD AUTO: 9.8 FL (ref 6–12)
RBC # BLD AUTO: 4.48 10*6/MM3 (ref 3.77–5.28)
TIBC SERPL-MCNC: 355 MCG/DL (ref 298–536)
TRANSFERRIN SERPL-MCNC: 238 MG/DL (ref 200–360)
WBC NRBC COR # BLD AUTO: 6.43 10*3/MM3 (ref 3.4–10.8)

## 2024-06-14 PROCEDURE — 85027 COMPLETE CBC AUTOMATED: CPT

## 2024-06-14 PROCEDURE — 36415 COLL VENOUS BLD VENIPUNCTURE: CPT

## 2024-06-14 PROCEDURE — 84466 ASSAY OF TRANSFERRIN: CPT

## 2024-06-14 PROCEDURE — 83540 ASSAY OF IRON: CPT

## 2024-06-17 ENCOUNTER — HOSPITAL ENCOUNTER (OUTPATIENT)
Dept: CARDIOLOGY | Facility: HOSPITAL | Age: 54
Discharge: HOME | End: 2024-06-17
Payer: COMMERCIAL

## 2024-06-17 VITALS — WEIGHT: 174 LBS | BODY MASS INDEX: 28.99 KG/M2 | HEIGHT: 65 IN

## 2024-06-17 DIAGNOSIS — R42 DIZZINESS: ICD-10-CM

## 2024-06-17 DIAGNOSIS — R00.2 PALPITATIONS: ICD-10-CM

## 2024-06-17 DIAGNOSIS — R06.09 DOE (DYSPNEA ON EXERTION): ICD-10-CM

## 2024-06-17 DIAGNOSIS — R07.9 CHEST PAIN, UNSPECIFIED TYPE: ICD-10-CM

## 2024-06-17 LAB
BH CV REST NUCLEAR ISOTOPE DOSE: 9.9 MCI
BH CV STRESS BP STAGE 1: NORMAL
BH CV STRESS BP STAGE 2: NORMAL
BH CV STRESS BP STAGE 3: NORMAL
BH CV STRESS DURATION MIN STAGE 1: 3
BH CV STRESS DURATION MIN STAGE 2: 3
BH CV STRESS DURATION MIN STAGE 3: 1
BH CV STRESS DURATION SEC STAGE 1: 0
BH CV STRESS DURATION SEC STAGE 2: 0
BH CV STRESS DURATION SEC STAGE 3: 32
BH CV STRESS GRADE STAGE 1: 10
BH CV STRESS GRADE STAGE 2: 12
BH CV STRESS GRADE STAGE 3: 14
BH CV STRESS HR STAGE 1: 107
BH CV STRESS HR STAGE 2: 134
BH CV STRESS HR STAGE 3: 144
BH CV STRESS METS STAGE 1: 5
BH CV STRESS METS STAGE 2: 7.5
BH CV STRESS METS STAGE 3: 10
BH CV STRESS NUCLEAR ISOTOPE DOSE: 33 MCI
BH CV STRESS O2 STAGE 1: 100
BH CV STRESS O2 STAGE 2: 99
BH CV STRESS O2 STAGE 3: 98
BH CV STRESS PROTOCOL 1: NORMAL
BH CV STRESS RECOVERY BP: NORMAL MMHG
BH CV STRESS RECOVERY HR: 77 BPM
BH CV STRESS RECOVERY O2: 100 %
BH CV STRESS SPEED STAGE 1: 1.7
BH CV STRESS SPEED STAGE 2: 2.5
BH CV STRESS SPEED STAGE 3: 3.4
BH CV STRESS STAGE 1: 1
BH CV STRESS STAGE 2: 2
BH CV STRESS STAGE 3: 3
LV EF NUC BP: 84 %
MAXIMAL PREDICTED HEART RATE: 165 BPM
PERCENT MAX PREDICTED HR: 88.48 %
STRESS BASELINE BP: NORMAL MMHG
STRESS BASELINE HR: 62 BPM
STRESS O2 SAT REST: 100 %
STRESS PERCENT HR: 104 %
STRESS POST ESTIMATED WORKLOAD: 7.8 METS
STRESS POST EXERCISE DUR MIN: 7 MIN
STRESS POST EXERCISE DUR SEC: 32 SEC
STRESS POST O2 SAT PEAK: 100 %
STRESS POST PEAK BP: NORMAL MMHG
STRESS POST PEAK HR: 146 BPM
STRESS TARGET HR: 140 BPM

## 2024-06-17 PROCEDURE — 78452 HT MUSCLE IMAGE SPECT MULT: CPT | Performed by: INTERNAL MEDICINE

## 2024-06-17 PROCEDURE — 0 TECHNETIUM SESTAMIBI: Performed by: NURSE PRACTITIONER

## 2024-06-17 PROCEDURE — 93016 CV STRESS TEST SUPVJ ONLY: CPT | Performed by: INTERNAL MEDICINE

## 2024-06-17 PROCEDURE — A9500 TC99M SESTAMIBI: HCPCS | Performed by: NURSE PRACTITIONER

## 2024-06-17 PROCEDURE — 93018 CV STRESS TEST I&R ONLY: CPT | Performed by: INTERNAL MEDICINE

## 2024-06-17 PROCEDURE — 93017 CV STRESS TEST TRACING ONLY: CPT

## 2024-06-17 PROCEDURE — 78452 HT MUSCLE IMAGE SPECT MULT: CPT

## 2024-06-17 RX ADMIN — TECHNETIUM TC 99M SESTAMIBI 1 DOSE: 1 INJECTION INTRAVENOUS at 10:00

## 2024-06-17 RX ADMIN — TECHNETIUM TC 99M SESTAMIBI 1 DOSE: 1 INJECTION INTRAVENOUS at 08:00

## 2024-07-09 NOTE — PROGRESS NOTES
[facility]  Heart and Valve Center  Telemedicine Visit      Mode of Visit: Video  Location of patient: home  You have chosen to receive care through a telehealth visit.  Does the patient consent to use a video/audio connection for your medical care today? Yes  The visit included audio and video interaction. No technical issues occurred during this visit.     Chief Complaint  Follow-up and Chest Pain    History of Present Illness    Eugenia Naik is a 54 y.o. female with fibromyalgia, hyperlipidemia who presents today as a telemedicine follow up for palpitations and chest pain.     She had a normal MPS, echo showed normal LVEF with no significant valve abnormalities.  She was placed in an extended Holter monitor which showed no significant arrhythmias, there were 7 episodes of SVT/PAT with the longest lasting 11 beats.  Triggered events were normal sinus rhythm.    She is feeling much better. No further chest pain or palpitations. She does notice symptoms are worse when she is outside in the heat        Objective     Vital Signs:   There were no vitals filed for this visit.  There is no height or weight on file to calculate BMI.    Virtual Visit Physical Exam  Physical Exam  Constitutional:       Appearance: Normal appearance.   HENT:      Head: Normocephalic.   Pulmonary:      Effort: Pulmonary effort is normal. No respiratory distress.   Neurological:      Mental Status: She is alert and oriented to person, place, and time.   Psychiatric:         Mood and Affect: Mood normal.         Behavior: Behavior normal.         Thought Content: Thought content normal.              Result Review  Data Reviewed:{ Labs  Result Review  Imaging  Med Tab  Media :23}   Adult Transthoracic Echo Complete W/ Cont if Necessary Per Protocol (06/11/2024 14:56)  Stress Test With Myocardial Perfusion (1 Day) (06/17/2024 11:29)  Holter x 12 days showed no significant arrhythmias, rare/brief SVT            Assessment and Plan {CC Problem List  Visit Diagnosis  ROS  Review (Popup)  Health Maintenance  Quality  BestPractice  Medications  SmartSets  SnapShot Encounters  Media :23}   1. Chest pain, unspecified type  -Normal MPS. Symptoms have resolved  - Discussed s/s of angina and reasons to call  - Discussed cardiac prevention-healthy diet, regular exercise, good lipid contro    2. Palpitations  - No significant arrhythmias  - Currently asymptomatic  - Fhx of Afib- advised to call if having new or worsening symptoms    3. Hyperlipidemia, unspecified hyperlipidemia type  - Last LDL not at goal on atorvastatin 10mg. She since has lost weight and improved her diet. Recommend repeat lipid panel and lipoprotein A  - Lipoprotein A (LPA); Future  - Lipid Panel; Future          Follow Up {Instructions Charge Capture  Follow-up Communications :23}   Return if symptoms worsen or fail to improve.    Patient was given instructions and counseling regarding her condition or for health maintenance advice. Please see specific information pulled into the AVS if appropriate.  Advised to call the Heart and Valve Center with any questions, concerns, or worsening symptoms.    Dictated Utilizing Dragon Dictation

## 2024-07-10 ENCOUNTER — TELEMEDICINE (OUTPATIENT)
Dept: CARDIOLOGY | Facility: HOSPITAL | Age: 54
End: 2024-07-10
Payer: COMMERCIAL

## 2024-07-10 DIAGNOSIS — E78.5 HYPERLIPIDEMIA, UNSPECIFIED HYPERLIPIDEMIA TYPE: Primary | ICD-10-CM

## 2024-07-10 DIAGNOSIS — R07.9 CHEST PAIN, UNSPECIFIED TYPE: ICD-10-CM

## 2024-07-10 DIAGNOSIS — R00.2 PALPITATIONS: ICD-10-CM

## 2024-07-25 ENCOUNTER — LAB (OUTPATIENT)
Dept: LAB | Facility: HOSPITAL | Age: 54
End: 2024-07-25
Payer: COMMERCIAL

## 2024-07-25 DIAGNOSIS — E78.5 HYPERLIPIDEMIA, UNSPECIFIED HYPERLIPIDEMIA TYPE: ICD-10-CM

## 2024-07-25 LAB
CHOLEST SERPL-MCNC: 201 MG/DL (ref 0–200)
HDLC SERPL-MCNC: 55 MG/DL (ref 40–60)
LDLC SERPL CALC-MCNC: 126 MG/DL (ref 0–100)
LDLC/HDLC SERPL: 2.24 {RATIO}
TRIGL SERPL-MCNC: 114 MG/DL (ref 0–150)
VLDLC SERPL-MCNC: 20 MG/DL (ref 5–40)

## 2024-07-25 PROCEDURE — 80061 LIPID PANEL: CPT

## 2024-07-25 PROCEDURE — 83695 ASSAY OF LIPOPROTEIN(A): CPT

## 2024-07-25 PROCEDURE — 36415 COLL VENOUS BLD VENIPUNCTURE: CPT

## 2024-07-26 LAB — LPA SERPL-SCNC: 26.4 NMOL/L

## 2024-07-29 NOTE — PROGRESS NOTES
Your lipoprotein a is normal. I don't think you need a statin at this time. Continue with healthy diet and exercise to improve cholesterol levels and have then annually checked with her PCP

## 2024-08-01 RX ORDER — PROPRANOLOL HYDROCHLORIDE 80 MG/1
CAPSULE, EXTENDED RELEASE ORAL
Qty: 90 CAPSULE | Refills: 3 | OUTPATIENT
Start: 2024-08-01

## 2024-08-02 RX ORDER — PROPRANOLOL HYDROCHLORIDE 80 MG/1
80 CAPSULE, EXTENDED RELEASE ORAL DAILY
Qty: 90 CAPSULE | Refills: 3 | Status: SHIPPED | OUTPATIENT
Start: 2024-08-02

## 2024-08-07 DIAGNOSIS — Z00.00 ANNUAL PHYSICAL EXAM: ICD-10-CM

## 2024-08-07 RX ORDER — ATORVASTATIN CALCIUM 10 MG/1
TABLET, FILM COATED ORAL
Qty: 90 TABLET | Refills: 3 | OUTPATIENT
Start: 2024-08-07

## 2024-10-08 DIAGNOSIS — M79.7 FIBROMYALGIA: ICD-10-CM

## 2024-10-09 RX ORDER — GABAPENTIN 100 MG/1
200 CAPSULE ORAL 2 TIMES DAILY
Qty: 360 CAPSULE | Refills: 2 | Status: SHIPPED | OUTPATIENT
Start: 2024-10-09

## 2024-10-09 NOTE — TELEPHONE ENCOUNTER
Rx Refill Note  Requested Prescriptions     Pending Prescriptions Disp Refills    gabapentin (NEURONTIN) 100 MG capsule 360 capsule 2     Sig: Take 2 capsules by mouth 2 (Two) Times a Day.      Last office visit with prescribing clinician: 6/5/2024   Last telemedicine visit with prescribing clinician: Visit date not found   Next office visit with prescribing clinician: 11/6/2024                         Would you like a call back once the refill request has been completed: [] Yes [] No    If the office needs to give you a call back, can they leave a voicemail: [] Yes [] No    Mayuri Segura MA  10/09/24, 09:57 EDT

## 2024-11-06 ENCOUNTER — OFFICE VISIT (OUTPATIENT)
Dept: FAMILY MEDICINE CLINIC | Facility: CLINIC | Age: 54
End: 2024-11-06
Payer: COMMERCIAL

## 2024-11-06 VITALS
DIASTOLIC BLOOD PRESSURE: 74 MMHG | OXYGEN SATURATION: 100 % | HEART RATE: 60 BPM | HEIGHT: 66 IN | WEIGHT: 169.4 LBS | BODY MASS INDEX: 27.23 KG/M2 | SYSTOLIC BLOOD PRESSURE: 118 MMHG

## 2024-11-06 DIAGNOSIS — L65.9 ALOPECIA: ICD-10-CM

## 2024-11-06 DIAGNOSIS — Z00.00 ANNUAL PHYSICAL EXAM: ICD-10-CM

## 2024-11-06 DIAGNOSIS — M79.7 FIBROMYALGIA: ICD-10-CM

## 2024-11-06 DIAGNOSIS — T75.3XXS MOTION SICKNESS, SEQUELA: Primary | ICD-10-CM

## 2024-11-06 PROBLEM — K21.9 GASTROESOPHAGEAL REFLUX DISEASE WITHOUT ESOPHAGITIS: Status: ACTIVE | Noted: 2024-11-06

## 2024-11-06 PROBLEM — G25.0 ESSENTIAL TREMOR: Status: ACTIVE | Noted: 2024-11-06

## 2024-11-06 RX ORDER — LEVOMEFOLATE CALCIUM 15 MG
15 TABLET ORAL DAILY
COMMUNITY

## 2024-11-06 RX ORDER — GABAPENTIN 300 MG/1
300 CAPSULE ORAL 3 TIMES DAILY
Qty: 270 CAPSULE | Refills: 2 | Status: SHIPPED | OUTPATIENT
Start: 2024-11-06

## 2024-11-06 RX ORDER — ONDANSETRON 8 MG/1
8 TABLET, ORALLY DISINTEGRATING ORAL EVERY 8 HOURS PRN
Qty: 90 TABLET | Refills: 1 | Status: SHIPPED | OUTPATIENT
Start: 2024-11-06 | End: 2024-11-07 | Stop reason: SDUPTHER

## 2024-11-06 NOTE — PROGRESS NOTES
Eugenia Naik  1970  [id number]  Patient Care Team:  Dario Lee MD as PCP - General (Internal Medicine)    Eugenia Naik is a 54 y.o. female here today for annual exam.  This patient is accompanied by their self who contributes to the history of their care.    Chief Complaint:    Chief Complaint   Patient presents with    Annual Exam    Alopecia     Brittle nails       History of Present Illness:   This is a 54-year-old female here for annual exam.  She has some currently up-to-date on screening exams.  She was recently told to discontinue her Lipitor and focus on diet and exercise given her normal apolipoprotein levels.  She did see cardiology for chest discomfort as well as palpitations. She had a normal MPS, echo showed normal LVEF with no significant valve abnormalities. She was placed in an extended Holter monitor which showed no significant arrhythmias, there were 7 episodes of SVT/PAT with the longest lasting 11 beats. Triggered events were normal sinus rhythm.  She continues to be followed at [facility] for essential tremor.  She continues on gabapentin for symptomatic fibromyalgia.    She has noted hair thinning an d brittle nails for past several months.  Cold intolerance lately. She has had an increase in her fatigue. Takes B6, k and d3. Hair skin nails vitamins did not help.    She continues to have difficulty swallowing food. Chews her food well. Was improved after dilatation for 3 mon- only for sx to return  Past Medical History:   Diagnosis Date    Acid reflux     Acute maxillary sinusitis     Anxiety and depression     Arthralgia     Arthritis     knee and foot    Colicky RUQ abdominal pain     Constipation     Depression     Dizziness and giddiness     Essential tremor     propranolol for this    Fibromyalgia, primary     GERD (gastroesophageal reflux disease)     High cholesterol     HPV in female     Lumbago     Metatarsalgia, right foot     PONV (postoperative  nausea and vomiting)     Pustule     Spinal headache     with csection    Tremor     Wears reading eyeglasses        Past Surgical History:   Procedure Laterality Date    ADENOIDECTOMY       SECTION      CHOLECYSTECTOMY      COLONOSCOPY  2018    ENDOMETRIAL ABLATION      KNEE ARTHROSCOPY Right 2018    Procedure: KNEE ARTHROSCOPY RIGHT;  Surgeon: Steve Mauro MD;  Location:  [facility];  Service: Orthopedics    TONSILLECTOMY      TOTAL LAPAROSCOPIC HYSTERECTOMY N/A 2018    Procedure: TOTAL LAPAROSCOPIC HYSTERECTOMY WITH DAVINCI WITH BILATERAL SALPINGO OOPHERECTOMY, SKIN TAG REMOVAL FROM INNER LABIA MINORA, CYSTO;  Surgeon: Renetta Artis MD;  Location:  [facility];  Service: Obstetrics/Gynecology        Family History   Problem Relation Age of Onset    Stroke Mother     Heart disease Mother     Arthritis Mother     Arrhythmia Mother     Heart failure Mother     Cancer Other     Heart disease Other     Depression Other     Heart attack Other     Depression Child     Anesthesia problems Other     Rheum arthritis Other     Hypertension Other     Hyperlipidemia Daughter     Heart attack Maternal Grandfather     Arrhythmia Maternal Uncle     Heart failure Maternal Uncle     Hyperlipidemia Brother     Hypertension Brother     Hypertension Brother     Hypertension Brother     Hypertension Brother     Vision loss Father         Macular degeneration       Social History     Socioeconomic History    Marital status:    Tobacco Use    Smoking status: Never    Smokeless tobacco: Never   Vaping Use    Vaping status: Never Used   Substance and Sexual Activity    Alcohol use: Yes     Alcohol/week: 4.0 standard drinks of alcohol     Types: 3 Glasses of wine, 1 Cans of beer per week     Comment: Occ    Drug use: No    Sexual activity: Yes     Partners: Male     Birth control/protection: Hysterectomy, Surgical       Allergies   Allergen Reactions    Scopolamine Hives     Likley the adhesive  "      Depression: PHQ-2 Depression Screening    Little interest or pleasure in doing things? Not at all   Feeling down, depressed, or hopeless? Not at all   PHQ-2 Total Score 0       Immunization History   Administered Date(s) Administered    COVID-19 (MODERNA) 1st,2nd,3rd Dose Monovalent 12/28/2020, 01/18/2021    COVID-19 (MODERNA) BIVALENT 12+YRS 12/19/2022    COVID-19 (PFIZER) 12YRS+ (COMIRNATY) 10/12/2023, 10/07/2024    COVID-19 (PFIZER) Purple Cap Monovalent 12/17/2020, 01/06/2021, 10/13/2021    Covid-19 (Pfizer) Gray Cap Monovalent 04/02/2022    Flublok 18+yrs 10/27/2022    Fluzone  >6mos 10/02/2018, 10/01/2019, 10/07/2024    Fluzone (or Fluarix & Flulaval for VFC) >6mos 09/19/2018, 10/12/2023    Fluzone Quad >6mos (Multi-dose) 10/02/2018, 10/01/2019    Hepatitis A 09/19/2018, 10/02/2018, 04/05/2019    Influenza, Unspecified 10/10/2020, 11/05/2021    Shingrix 01/14/2020, 06/23/2020    Tdap 10/11/2017       Intimate partner violence ( Screen on initial visit, pregnant women, women with injuries, older adult with injury or evidence of neglect):  -Violence can be a problem in many people's lives, so I now ask every patient about trauma or abuse they may have experienced in a relationship.   Stress/Safety - Do you feel safe in your relationship?   Afraid/Abused - Have you ever been in a relationship where you were threatened, hurt, or afraid?   Friend/Family - Are your friends aware you have been hurt?   Emergency Plan - Do you have a safe place to go and the resources you need in an emergency?    Review of Systems:    Review of Systems   Constitutional:  Positive for fatigue.   Musculoskeletal:  Positive for myalgias.       Vitals:    11/06/24 1456   BP: 118/74   Pulse: 60   SpO2: 100%   Weight: 76.8 kg (169 lb 6.4 oz)   Height: 167.6 cm (66\")     Body mass index is 27.34 kg/m².      Current Outpatient Medications:     busPIRone (BUSPAR) 15 MG tablet, Take 1 tablet by mouth 2 (Two) Times a Day., Disp: , Rfl:     " DULoxetine (CYMBALTA) 60 MG capsule, TAKE 1 CAPSULE DAILY, Disp: 90 capsule, Rfl: 3    estradiol (ESTRACE) 0.1 MG/GM vaginal cream, , Disp: , Rfl:     FOLIC ACID PO, Take  by mouth Daily., Disp: , Rfl:     gabapentin (NEURONTIN) 300 MG capsule, Take 1 capsule by mouth 3 times a day., Disp: 270 capsule, Rfl: 2    hydroCHLOROthiazide 25 MG tablet, TAKE 1 TABLET DAILY (Patient taking differently: Take 0.5 tablets by mouth Daily.), Disp: 30 tablet, Rfl: 11    l-methylfolate 15 MG tablet tablet, Take 1 tablet by mouth Daily., Disp: , Rfl:     lamoTRIgine (LaMICtal) 200 MG tablet, Take 1 tablet by mouth Daily., Disp: , Rfl:     omeprazole (priLOSEC) 40 MG capsule, Take 1 capsule by mouth Daily., Disp: 90 capsule, Rfl: 3    propranolol LA (INDERAL LA) 80 MG 24 hr capsule, Take 1 capsule by mouth Daily., Disp: 90 capsule, Rfl: 3    topiramate (TOPAMAX) 50 MG tablet, Take 1.5 tablets by mouth., Disp: , Rfl:     traZODone (DESYREL) 100 MG tablet, Take 0.5 - 1 tablet by mouth At Night As Needed for sleep, Disp: 30 tablet, Rfl: 2    ondansetron ODT (ZOFRAN-ODT) 8 MG disintegrating tablet, Place 1 tablet on the tongue Every 8 (Eight) Hours As Needed for Nausea or Vomiting (motion sickness)., Disp: 90 tablet, Rfl: 1    Physical Exam:    Physical Exam  Vitals and nursing note reviewed.   Constitutional:       General: She is not in acute distress.     Appearance: She is well-developed. She is not diaphoretic.   HENT:      Head: Normocephalic and atraumatic.      Right Ear: External ear normal.      Left Ear: External ear normal.      Mouth/Throat:      Pharynx: No oropharyngeal exudate.   Eyes:      General: No scleral icterus.        Right eye: No discharge.      Conjunctiva/sclera: Conjunctivae normal.   Neck:      Thyroid: No thyromegaly.      Vascular: No JVD.      Trachea: No tracheal deviation.   Cardiovascular:      Rate and Rhythm: Normal rate and regular rhythm.      Heart sounds: Normal heart sounds.      Comments: PMI  nondisplaced  Pulmonary:      Effort: Pulmonary effort is normal.      Breath sounds: Normal breath sounds. No wheezing or rales.   Abdominal:      General: Bowel sounds are normal.      Palpations: Abdomen is soft.      Tenderness: There is no abdominal tenderness. There is no guarding or rebound.   Musculoskeletal:      Cervical back: Normal range of motion and neck supple.   Lymphadenopathy:      Cervical: No cervical adenopathy.   Skin:     General: Skin is warm and dry.      Capillary Refill: Capillary refill takes less than 2 seconds.      Coloration: Skin is not pale.      Findings: No rash.   Neurological:      Mental Status: She is alert and oriented to person, place, and time.      Motor: No abnormal muscle tone.      Coordination: Coordination normal.   Psychiatric:         Judgment: Judgment normal.         Procedures    Results Review:    None    Assessment/Plan:    Problem List Items Addressed This Visit    None  Visit Diagnoses       Motion sickness, sequela    -  Primary    Relevant Medications    ondansetron ODT (ZOFRAN-ODT) 8 MG disintegrating tablet    Annual physical exam        Relevant Orders    Comprehensive Metabolic Panel    TSH Rfx On Abnormal To Free T4    CBC (No Diff)    Alopecia        Relevant Medications    ondansetron ODT (ZOFRAN-ODT) 8 MG disintegrating tablet    Fibromyalgia        Relevant Medications    gabapentin (NEURONTIN) 300 MG capsule        She is looking for ways to problem solve her fibromyalgia pain while [place].  She typically uses patient on marijuana cannot travel with this.  We opted to add an additional gabapentin to her regimen to see if this works.  Alternatively we can send her with a small prescription of Percocet as she has tolerated this in the past.  She is intolerant of tramadol for hydrocodone secondary to severe nauseousness.    Plan of care was reviewed with patient at the conclusion of today's visit. Counseled patient with regards to good  nutrition and diet. Maintaining a healthy lifestyle including exercise and physical activities. Spoke with patient on ways to reduce stress, getting adequate sleep and injury prevention.  Discussed mammogram, colon cancer screening, osteoporosis and pap smear including benefit of early detection and potential need for follow-up. Patient agrees to screening mammogram, colonoscopy, bone density and gyn referral today. Annual dental and eye exams were encouraged. Encouraged patient to continue to follow up with annual immunizations.     Return in about 6 months (around 5/6/2025) for fms.    Dario Lee MD    Please note than portions of this note were completed wth a Voice Recognition Program

## 2024-11-07 ENCOUNTER — PATIENT MESSAGE (OUTPATIENT)
Dept: FAMILY MEDICINE CLINIC | Facility: CLINIC | Age: 54
End: 2024-11-07
Payer: COMMERCIAL

## 2024-11-07 DIAGNOSIS — L65.9 ALOPECIA: ICD-10-CM

## 2024-11-07 DIAGNOSIS — T75.3XXS MOTION SICKNESS, SEQUELA: ICD-10-CM

## 2024-11-07 RX ORDER — ONDANSETRON 8 MG/1
8 TABLET, ORALLY DISINTEGRATING ORAL EVERY 8 HOURS PRN
Qty: 90 TABLET | Refills: 1 | Status: SHIPPED | OUTPATIENT
Start: 2024-11-07

## 2024-11-08 ENCOUNTER — LAB (OUTPATIENT)
Dept: LAB | Facility: HOSPITAL | Age: 54
End: 2024-11-08
Payer: COMMERCIAL

## 2024-11-08 DIAGNOSIS — Z00.00 ANNUAL PHYSICAL EXAM: ICD-10-CM

## 2024-11-08 LAB
ALBUMIN SERPL-MCNC: 4.5 G/DL (ref 3.5–5.2)
ALBUMIN/GLOB SERPL: 2.3 G/DL
ALP SERPL-CCNC: 77 U/L (ref 39–117)
ALT SERPL W P-5'-P-CCNC: 14 U/L (ref 1–33)
ANION GAP SERPL CALCULATED.3IONS-SCNC: 13 MMOL/L (ref 5–15)
AST SERPL-CCNC: 13 U/L (ref 1–32)
BILIRUB SERPL-MCNC: 0.4 MG/DL (ref 0–1.2)
BUN SERPL-MCNC: 17 MG/DL (ref 6–20)
BUN/CREAT SERPL: 14.9 (ref 7–25)
CALCIUM SPEC-SCNC: 9.4 MG/DL (ref 8.6–10.5)
CHLORIDE SERPL-SCNC: 99 MMOL/L (ref 98–107)
CO2 SERPL-SCNC: 25 MMOL/L (ref 22–29)
CREAT SERPL-MCNC: 1.14 MG/DL (ref 0.57–1)
DEPRECATED RDW RBC AUTO: 39.6 FL (ref 37–54)
EGFRCR SERPLBLD CKD-EPI 2021: 57 ML/MIN/1.73
ERYTHROCYTE [DISTWIDTH] IN BLOOD BY AUTOMATED COUNT: 12.3 % (ref 12.3–15.4)
GLOBULIN UR ELPH-MCNC: 2 GM/DL
GLUCOSE SERPL-MCNC: 82 MG/DL (ref 65–99)
HCT VFR BLD AUTO: 42.3 % (ref 34–46.6)
HGB BLD-MCNC: 14.5 G/DL (ref 12–15.9)
MCH RBC QN AUTO: 30.5 PG (ref 26.6–33)
MCHC RBC AUTO-ENTMCNC: 34.3 G/DL (ref 31.5–35.7)
MCV RBC AUTO: 88.9 FL (ref 79–97)
PLATELET # BLD AUTO: 268 10*3/MM3 (ref 140–450)
PMV BLD AUTO: 9.5 FL (ref 6–12)
POTASSIUM SERPL-SCNC: 3.7 MMOL/L (ref 3.5–5.2)
PROT SERPL-MCNC: 6.5 G/DL (ref 6–8.5)
RBC # BLD AUTO: 4.76 10*6/MM3 (ref 3.77–5.28)
SODIUM SERPL-SCNC: 137 MMOL/L (ref 136–145)
TSH SERPL DL<=0.05 MIU/L-ACNC: 1.98 UIU/ML (ref 0.27–4.2)
WBC NRBC COR # BLD AUTO: 4.91 10*3/MM3 (ref 3.4–10.8)

## 2024-11-08 PROCEDURE — 80050 GENERAL HEALTH PANEL: CPT

## 2024-11-11 DIAGNOSIS — R79.89 ELEVATED SERUM CREATININE: Primary | ICD-10-CM

## 2024-11-11 NOTE — PROGRESS NOTES
Labs overall look good creatinine up a little.  Will try to avoid NSAIDs.  Focus on consuming at least 84 ounces of water per day.  Repeat creatinine in about 3 months.

## 2024-11-25 DIAGNOSIS — M79.7 FIBROMYALGIA: ICD-10-CM

## 2024-11-25 RX ORDER — GABAPENTIN 300 MG/1
300 CAPSULE ORAL 3 TIMES DAILY
Qty: 270 CAPSULE | Refills: 2 | Status: SHIPPED | OUTPATIENT
Start: 2024-11-25

## 2025-01-15 RX ORDER — LAMOTRIGINE 200 MG/1
200 TABLET ORAL DAILY
OUTPATIENT
Start: 2025-01-15

## 2025-01-15 NOTE — TELEPHONE ENCOUNTER
Rx Refill Note  Requested Prescriptions     Pending Prescriptions Disp Refills    lamoTRIgine (LaMICtal) 200 MG tablet       Sig: Take 1 tablet by mouth Daily.      Last office visit with prescribing clinician: 11/6/2024   Last telemedicine visit with prescribing clinician: Visit date not found   Next office visit with prescribing clinician: 5/6/2025                         Would you like a call back once the refill request has been completed: [] Yes [] No    If the office needs to give you a call back, can they leave a voicemail: [] Yes [] No    Kenia Cobian MA  01/15/25, 12:04 EST

## 2025-02-03 DIAGNOSIS — K21.9 GASTROESOPHAGEAL REFLUX DISEASE WITHOUT ESOPHAGITIS: ICD-10-CM

## 2025-02-03 RX ORDER — OMEPRAZOLE 40 MG/1
40 CAPSULE, DELAYED RELEASE ORAL DAILY
Qty: 90 CAPSULE | Refills: 3 | OUTPATIENT
Start: 2025-02-03

## 2025-02-04 DIAGNOSIS — K21.9 GASTROESOPHAGEAL REFLUX DISEASE WITHOUT ESOPHAGITIS: ICD-10-CM

## 2025-02-04 RX ORDER — OMEPRAZOLE 40 MG/1
40 CAPSULE, DELAYED RELEASE ORAL DAILY
Qty: 90 CAPSULE | Refills: 3 | OUTPATIENT
Start: 2025-02-04

## 2025-02-10 DIAGNOSIS — F41.8 DEPRESSION WITH ANXIETY: ICD-10-CM

## 2025-02-10 RX ORDER — DULOXETIN HYDROCHLORIDE 60 MG/1
CAPSULE, DELAYED RELEASE ORAL
Qty: 90 CAPSULE | Refills: 3 | OUTPATIENT
Start: 2025-02-10

## 2025-02-14 DIAGNOSIS — F41.8 DEPRESSION WITH ANXIETY: ICD-10-CM

## 2025-02-14 RX ORDER — DULOXETIN HYDROCHLORIDE 60 MG/1
60 CAPSULE, DELAYED RELEASE ORAL DAILY
Qty: 90 CAPSULE | Refills: 3 | OUTPATIENT
Start: 2025-02-14

## 2025-02-26 DIAGNOSIS — K21.9 GASTROESOPHAGEAL REFLUX DISEASE WITHOUT ESOPHAGITIS: ICD-10-CM

## 2025-02-26 RX ORDER — OMEPRAZOLE 40 MG/1
40 CAPSULE, DELAYED RELEASE ORAL DAILY
Qty: 60 CAPSULE | Refills: 5 | OUTPATIENT
Start: 2025-02-26

## 2025-02-26 RX ORDER — OMEPRAZOLE 40 MG/1
40 CAPSULE, DELAYED RELEASE ORAL DAILY
Qty: 90 CAPSULE | Refills: 3 | OUTPATIENT
Start: 2025-02-26

## 2025-02-27 DIAGNOSIS — K21.9 GASTROESOPHAGEAL REFLUX DISEASE WITHOUT ESOPHAGITIS: ICD-10-CM

## 2025-02-27 RX ORDER — OMEPRAZOLE 40 MG/1
40 CAPSULE, DELAYED RELEASE ORAL DAILY
Qty: 90 CAPSULE | Refills: 3 | Status: SHIPPED | OUTPATIENT
Start: 2025-02-27

## 2025-03-24 DIAGNOSIS — R60.9 EDEMA, UNSPECIFIED TYPE: ICD-10-CM

## 2025-03-24 RX ORDER — HYDROCHLOROTHIAZIDE 25 MG/1
25 TABLET ORAL DAILY
Qty: 90 TABLET | Refills: 3 | OUTPATIENT
Start: 2025-03-24

## 2025-03-30 DIAGNOSIS — R60.9 EDEMA, UNSPECIFIED TYPE: ICD-10-CM

## 2025-03-31 DIAGNOSIS — F41.8 DEPRESSION WITH ANXIETY: ICD-10-CM

## 2025-03-31 DIAGNOSIS — R60.9 EDEMA, UNSPECIFIED TYPE: ICD-10-CM

## 2025-03-31 RX ORDER — DULOXETIN HYDROCHLORIDE 60 MG/1
60 CAPSULE, DELAYED RELEASE ORAL DAILY
Qty: 90 CAPSULE | Refills: 3 | Status: SHIPPED | OUTPATIENT
Start: 2025-03-31

## 2025-03-31 RX ORDER — HYDROCHLOROTHIAZIDE 25 MG/1
25 TABLET ORAL DAILY
Qty: 30 TABLET | Refills: 11 | OUTPATIENT
Start: 2025-03-31

## 2025-03-31 RX ORDER — HYDROCHLOROTHIAZIDE 25 MG/1
12.5 TABLET ORAL DAILY
Qty: 45 TABLET | Refills: 3 | Status: SHIPPED | OUTPATIENT
Start: 2025-03-31

## 2025-05-06 ENCOUNTER — OFFICE VISIT (OUTPATIENT)
Dept: FAMILY MEDICINE CLINIC | Facility: CLINIC | Age: 55
End: 2025-05-06
Payer: COMMERCIAL

## 2025-05-06 ENCOUNTER — LAB (OUTPATIENT)
Dept: LAB | Facility: HOSPITAL | Age: 55
End: 2025-05-06
Payer: COMMERCIAL

## 2025-05-06 VITALS
BODY MASS INDEX: 26.78 KG/M2 | DIASTOLIC BLOOD PRESSURE: 72 MMHG | WEIGHT: 166.6 LBS | OXYGEN SATURATION: 98 % | HEIGHT: 66 IN | HEART RATE: 63 BPM | SYSTOLIC BLOOD PRESSURE: 112 MMHG

## 2025-05-06 DIAGNOSIS — R53.83 OTHER FATIGUE: ICD-10-CM

## 2025-05-06 DIAGNOSIS — K59.09 CHRONIC CONSTIPATION: Primary | ICD-10-CM

## 2025-05-06 DIAGNOSIS — M79.7 FIBROMYALGIA: ICD-10-CM

## 2025-05-06 DIAGNOSIS — M79.10 MYALGIA: ICD-10-CM

## 2025-05-06 DIAGNOSIS — E55.9 VITAMIN D DEFICIENCY: ICD-10-CM

## 2025-05-06 DIAGNOSIS — L56.8 PHOTOSENSITIVITY DERMATITIS: ICD-10-CM

## 2025-05-06 DIAGNOSIS — F41.8 DEPRESSION WITH ANXIETY: ICD-10-CM

## 2025-05-06 DIAGNOSIS — R79.89 ELEVATED SERUM CREATININE: ICD-10-CM

## 2025-05-06 PROCEDURE — 82607 VITAMIN B-12: CPT

## 2025-05-06 PROCEDURE — 36415 COLL VENOUS BLD VENIPUNCTURE: CPT

## 2025-05-06 PROCEDURE — 86038 ANTINUCLEAR ANTIBODIES: CPT

## 2025-05-06 PROCEDURE — 86140 C-REACTIVE PROTEIN: CPT

## 2025-05-06 PROCEDURE — 82306 VITAMIN D 25 HYDROXY: CPT

## 2025-05-06 PROCEDURE — 82746 ASSAY OF FOLIC ACID SERUM: CPT

## 2025-05-06 PROCEDURE — 85652 RBC SED RATE AUTOMATED: CPT

## 2025-05-06 PROCEDURE — 82550 ASSAY OF CK (CPK): CPT

## 2025-05-06 PROCEDURE — 80048 BASIC METABOLIC PNL TOTAL CA: CPT

## 2025-05-06 RX ORDER — BACLOFEN 10 MG/1
10 TABLET ORAL 3 TIMES DAILY
Qty: 90 TABLET | Refills: 2 | Status: SHIPPED | OUTPATIENT
Start: 2025-05-06 | End: 2025-05-06

## 2025-05-06 RX ORDER — BACLOFEN 10 MG/1
10 TABLET ORAL 3 TIMES DAILY
Qty: 270 TABLET | Refills: 2 | Status: SHIPPED | OUTPATIENT
Start: 2025-05-06

## 2025-05-06 NOTE — PROGRESS NOTES
Eugenia Naik  1970  [id number]  Patient Care Team:  Dario Lee MD as PCP - General (Internal Medicine)    Eugenia Naik is a 54 y.o. female here today for follow up.   Answers submitted by the patient for this visit:  Problem not listed (Submitted on 4/30/2025)  Chief Complaint: Other medical problem  Reason for appointment: Follow up  joint pain: Yes  change in stool: Yes  Onset: 1 to 5 years  Chronicity: chronic  Frequency: constantly  Medications tried: Prescription medication    This patient is accompanied by their self who contributes to the history of their care.    Chief Complaint:    Chief Complaint   Patient presents with    fms    Rash     Gets prickly when sun hits back    Constipation        History of Present Illness:  I have reviewed and/or updated the patient's past medical, past surgical, family, social history, problem list and allergies as appropriate.     Zofran made her already present constipation worse.   Can go up to 10 days b/w BM    Has tried fiber, ira, miralax, ronn, dulcolax. Has seen dr esteban in the past. Drinks 80 oz water daily. Has never been on amitiza, linzess etc.     Fms had been worse.  On gabapentin bid - is rxed to tid. Remains on duloxetine and gabapentin. Has had increased fatigue. Will  fall asleep very easily. Does not sleep in car.  Sleep is non restful. Unsure will snore when supine. Will nap- especially after MJ use for her FMS. Lately he has required daily naps. Continues to swim- - thinks perhaps p cold is causing flare. Has been seeing chiropractor fro back/hip issue- left side- sx are troch pain into her back\/sacrum and glute. Denies any weakness numbness or tingling. Brain fog worse. Continues stretching    Tremo has been helped with topamax - has been on this for > 1 year- her scerum creatinine. Has been increasing- she additionaly has been io hctz daily     Previously rash on back, tiny fluid-filled spot.  Sun exposed areas worse over  "the past year.    Review of Systems   Constitutional:  Positive for fatigue.   Respiratory: Negative.     Cardiovascular: Negative.    Genitourinary: Negative.    Musculoskeletal:  Positive for myalgias.       Vitals:    05/06/25 1137   BP: 112/72   Pulse: 63   SpO2: 98%   Weight: 75.6 kg (166 lb 9.6 oz)   Height: 167.6 cm (65.98\")     Body mass index is 26.9 kg/m².    Physical Exam  Vitals and nursing note reviewed.   Constitutional:       General: She is not in acute distress.     Appearance: She is well-developed. She is not diaphoretic.   HENT:      Head: Normocephalic and atraumatic.      Right Ear: External ear normal.      Left Ear: External ear normal.      Mouth/Throat:      Pharynx: No oropharyngeal exudate.   Eyes:      General: No scleral icterus.        Right eye: No discharge.      Conjunctiva/sclera: Conjunctivae normal.   Neck:      Thyroid: No thyromegaly.      Vascular: No JVD.      Trachea: No tracheal deviation.   Cardiovascular:      Rate and Rhythm: Normal rate and regular rhythm.      Heart sounds: Normal heart sounds.      Comments: PMI nondisplaced  Pulmonary:      Effort: Pulmonary effort is normal.      Breath sounds: Normal breath sounds. No wheezing or rales.   Abdominal:      General: Bowel sounds are normal.      Palpations: Abdomen is soft.      Tenderness: There is no abdominal tenderness. There is no guarding or rebound.   Musculoskeletal:      Cervical back: Normal range of motion and neck supple.   Lymphadenopathy:      Cervical: No cervical adenopathy.   Skin:     General: Skin is warm and dry.      Capillary Refill: Capillary refill takes less than 2 seconds.      Coloration: Skin is not pale.      Findings: No rash.   Neurological:      Mental Status: She is alert and oriented to person, place, and time.      Motor: No abnormal muscle tone.      Coordination: Coordination normal.   Psychiatric:         Mood and Affect: Mood normal.         Behavior: Behavior normal.         " Judgment: Judgment normal.         Procedures    Results Review:    I reviewed the patient's new clinical results.    Assessment/Plan:    Problem List Items Addressed This Visit    None  Visit Diagnoses         Chronic constipation    -  Primary    Relevant Medications    linaclotide (Linzess) 145 MCG capsule capsule      Fibromyalgia          Myalgia        Relevant Orders    CK      Other fatigue        Relevant Orders    Vitamin B12    Folate      Vitamin D deficiency        Relevant Orders    Vitamin D 25 hydroxy      Photosensitivity dermatitis        Relevant Orders    Ambulatory Referral to Dermatology    SUSAN    C-reactive protein    Sedimentation rate, automated        Is a 72 mcg we will try 145 continue excellent hydration.  Labs as above to evaluate her myalgias.  Trial of baclofen 10 mg p.o. 3 times daily.  Continue Cymbalta gabapentin photosensitivity coincided with the addition of Topamax review side effects.  Avoidance dermatologic referral.  Plan of care reviewed with patient at the conclusion of today's visit. Education was provided regarding diagnosis and management.  Patient verbalizes understanding of and agreement with management plan.    Return in about 6 months (around 11/6/2025).    Dario Lee MD      Please note than portions of this note were completed [facility] a Voice Recognition Program

## 2025-05-07 LAB
25(OH)D3 SERPL-MCNC: 55.1 NG/ML (ref 30–100)
ANION GAP SERPL CALCULATED.3IONS-SCNC: 12.5 MMOL/L (ref 5–15)
BUN SERPL-MCNC: 9 MG/DL (ref 6–20)
BUN/CREAT SERPL: 13.8 (ref 7–25)
CALCIUM SPEC-SCNC: 9.7 MG/DL (ref 8.6–10.5)
CHLORIDE SERPL-SCNC: 98 MMOL/L (ref 98–107)
CK SERPL-CCNC: 57 U/L (ref 20–180)
CO2 SERPL-SCNC: 24.5 MMOL/L (ref 22–29)
CREAT SERPL-MCNC: 0.65 MG/DL (ref 0.57–1)
CRP SERPL-MCNC: <0.3 MG/DL (ref 0–0.5)
EGFRCR SERPLBLD CKD-EPI 2021: 104.1 ML/MIN/1.73
ERYTHROCYTE [SEDIMENTATION RATE] IN BLOOD: 4 MM/HR (ref 0–30)
FOLATE SERPL-MCNC: >20 NG/ML (ref 4.78–24.2)
GLUCOSE SERPL-MCNC: 93 MG/DL (ref 65–99)
POTASSIUM SERPL-SCNC: 3.4 MMOL/L (ref 3.5–5.2)
SODIUM SERPL-SCNC: 135 MMOL/L (ref 136–145)
VIT B12 BLD-MCNC: 544 PG/ML (ref 211–946)

## 2025-05-07 RX ORDER — DULOXETIN HYDROCHLORIDE 60 MG/1
60 CAPSULE, DELAYED RELEASE ORAL DAILY
Qty: 90 CAPSULE | Refills: 3 | OUTPATIENT
Start: 2025-05-07

## 2025-05-07 NOTE — TELEPHONE ENCOUNTER
Rx Refill Note  Requested Prescriptions     Pending Prescriptions Disp Refills    DULoxetine (CYMBALTA) 60 MG capsule 90 capsule 3     Sig: Take 1 capsule by mouth Daily.      Last office visit with prescribing clinician: 5/6/2025   Last telemedicine visit with prescribing clinician: Visit date not found   Next office visit with prescribing clinician: 11/6/2025                         Would you like a call back once the refill request has been completed: [] Yes [] No    If the office needs to give you a call back, can they leave a voicemail: [] Yes [] No    Corrina Madrigal MA  05/07/25, 11:51 EDT

## 2025-05-08 LAB — ANA SER QL: NEGATIVE

## 2025-05-09 RX ORDER — POTASSIUM CHLORIDE 1500 MG/1
20 TABLET, EXTENDED RELEASE ORAL DAILY
Qty: 90 TABLET | Refills: 2 | Status: SHIPPED | OUTPATIENT
Start: 2025-05-09

## 2025-05-12 DIAGNOSIS — F41.8 DEPRESSION WITH ANXIETY: ICD-10-CM

## 2025-05-12 RX ORDER — DULOXETIN HYDROCHLORIDE 60 MG/1
60 CAPSULE, DELAYED RELEASE ORAL DAILY
Qty: 90 CAPSULE | Refills: 3 | Status: SHIPPED | OUTPATIENT
Start: 2025-05-12

## 2025-05-16 DIAGNOSIS — M79.7 FIBROMYALGIA: ICD-10-CM

## 2025-05-19 RX ORDER — GABAPENTIN 300 MG/1
300 CAPSULE ORAL 3 TIMES DAILY
Qty: 270 CAPSULE | Refills: 2 | Status: SHIPPED | OUTPATIENT
Start: 2025-05-19

## 2025-05-19 NOTE — TELEPHONE ENCOUNTER
Rx Refill Note  Requested Prescriptions     Pending Prescriptions Disp Refills    gabapentin (NEURONTIN) 300 MG capsule 270 capsule 2     Sig: Take 1 capsule by mouth 3 times a day.      Last office visit with prescribing clinician: 5/6/2025   Last telemedicine visit with prescribing clinician: Visit date not found   Next office visit with prescribing clinician: 11/6/2025                         Would you like a call back once the refill request has been completed: [] Yes [] No    If the office needs to give you a call back, can they leave a voicemail: [] Yes [] No    Kenia Cobian MA  05/19/25, 08:20 EDT

## 2025-06-23 DIAGNOSIS — K59.09 CHRONIC CONSTIPATION: ICD-10-CM

## 2025-07-28 RX ORDER — PROPRANOLOL HYDROCHLORIDE 80 MG/1
80 CAPSULE, EXTENDED RELEASE ORAL DAILY
Qty: 90 CAPSULE | Refills: 3 | Status: SHIPPED | OUTPATIENT
Start: 2025-07-28

## (undated) DEVICE — MANIP UTER RUMI TP 5.1MM 3.75CM

## (undated) DEVICE — SUT VICYL PLS CTD ANTIB BR 1 27IN VIL

## (undated) DEVICE — CVR HNDL LT SURG ACCSSRY BLU STRL

## (undated) DEVICE — PAD ARMBRD SURG CONVOL 7.5X20X2IN

## (undated) DEVICE — 3M™ STERI-STRIP™ REINFORCED ADHESIVE SKIN CLOSURES, R1547, 1/2 IN X 4 IN (12 MM X 100 MM), 6 STRIPS/ENVELOPE: Brand: 3M™ STERI-STRIP™

## (undated) DEVICE — MEDI-VAC NON-CONDUCTIVE SUCTION TUBING: Brand: CARDINAL HEALTH

## (undated) DEVICE — TROCARS: Brand: KII® OPTICAL ACCESS SYSTEM

## (undated) DEVICE — FLTR HME STR UNIV W/SMPL PORT

## (undated) DEVICE — ENCORE® LATEX MICRO SIZE 6.5, STERILE LATEX POWDER-FREE SURGICAL GLOVE: Brand: ENCORE

## (undated) DEVICE — HOLDER: Brand: DEROYAL

## (undated) DEVICE — CANNULA,OXY,ADULT,SUPERSOFT,W/7'TUB,UC: Brand: MEDLINE

## (undated) DEVICE — TRY SKINPREP DRYPREP

## (undated) DEVICE — DRAPE,TOP,102X53,STERILE: Brand: MEDLINE

## (undated) DEVICE — ANTIBACTERIAL UNDYED BRAIDED (POLYGLACTIN 910), SYNTHETIC ABSORBABLE SUTURE: Brand: COATED VICRYL

## (undated) DEVICE — SOL LR 1000ML

## (undated) DEVICE — LEX BASIC NO DRAPE: Brand: MEDLINE INDUSTRIES, INC.

## (undated) DEVICE — IRRIGATOR TOOMEY 70CC

## (undated) DEVICE — VCARE LARGE UTERINE MANIPULATOR: Brand: VCARE LARGE

## (undated) DEVICE — GLV SURG DERMASSURE GRN LF PF SZ 6.5

## (undated) DEVICE — SUT MNCRYL PLS ANTIB UD 4/0 PS2 18IN

## (undated) DEVICE — UNDERCAST PADDING: Brand: DEROYAL

## (undated) DEVICE — TB CASSET ARTHSCP CROSSFLOW INFLOW LF

## (undated) DEVICE — OCCL COLPO PNEUMO  STRL

## (undated) DEVICE — LEGGINGS, PAIR, 29X43, STERILE: Brand: MEDLINE

## (undated) DEVICE — SUT VIC 0 TIES 18IN J912G

## (undated) DEVICE — GLV SURG SIGNATURE TOUCH PF LTX 8 STRL BX/50

## (undated) DEVICE — ENDOPATH PNEUMONEEDLE INSUFFLATION NEEDLES WITH LUER LOCK CONNECTORS 120MM: Brand: ENDOPATH

## (undated) DEVICE — BNDG ESMARK 6INX9FT STRL

## (undated) DEVICE — GLV SURG SENSICARE MICRO PF LF 7.5 STRL

## (undated) DEVICE — APPL CHLORAPREP W/TINT 26ML BLU

## (undated) DEVICE — CANNULA SEAL

## (undated) DEVICE — SUT MONOCRYL PLS ANTIB UND 3/0  PS1 27IN

## (undated) DEVICE — GLV SURG SENSICARE MICRO PF LF 6.5 STRL

## (undated) DEVICE — GLV SURG SIGNATURE TOUCH PF LTX 7 STRL

## (undated) DEVICE — DUAL LUMEN STOMACH TUBE,ANTI-REFLUX VALVE: Brand: SALEM SUMP

## (undated) DEVICE — [HIGH FLOW INSUFFLATOR,  DO NOT USE IF PACKAGE IS DAMAGED,  KEEP DRY,  KEEP AWAY FROM SUNLIGHT,  PROTECT FROM HEAT AND RADIOACTIVE SOURCES.]: Brand: PNEUMOSURE

## (undated) DEVICE — Device

## (undated) DEVICE — TIP COVER ACCESSORY

## (undated) DEVICE — PK ARTHSCP KN 10

## (undated) DEVICE — APPL CHLORAPREP W/ALC 26ML CLR

## (undated) DEVICE — GLV SURG TRIUMPH ORTHO W/ALOE PF LTX 8 STRL

## (undated) DEVICE — ENDOPATH XCEL BLADELESS TROCARS WITH STABILITY SLEEVES: Brand: ENDOPATH XCEL

## (undated) DEVICE — 2000CC GUARDIAN II: Brand: GUARDIAN

## (undated) DEVICE — BOOTS FL BARRIER MED (50 PAIRS CS)

## (undated) DEVICE — CYSTO/BLADDER IRRIGATION SET, REGULATING CLAMP

## (undated) DEVICE — 3M™ WARMING BLANKET, UPPER BODY, 10 PER CASE, 42268: Brand: BAIR HUGGER™

## (undated) DEVICE — SYR LL TP 10ML STRL

## (undated) DEVICE — SYR TB SFTY 1CC W 27G 1/2IN NDL BX/100

## (undated) DEVICE — SUT ETHLN 3/0 PC5 18IN 1893G

## (undated) DEVICE — AIRWY 90MM NO9

## (undated) DEVICE — PK MAJ GYN DAVINCI 10

## (undated) DEVICE — CATH IV ANGIOCATH FEP 14GA 1.88IN ORNG

## (undated) DEVICE — OBT BLADLES ENDOWRIST DAVINCI/S 8MM

## (undated) DEVICE — FLTR PLUMEPORT LAP W/CONN STRL

## (undated) DEVICE — GOWN,REINF,POLY,ECL,PP SLV,XL: Brand: MEDLINE

## (undated) DEVICE — BNDG ELAS CO-FLEX SLF ADHR 6IN 5YD LF STRL

## (undated) DEVICE — ADHS LIQ MASTISOL 2/3ML

## (undated) DEVICE — BLD CUT FORMLA RESECTOR 4.0MM

## (undated) DEVICE — MEDI-VAC YANKAUER SUCTION HANDLE W/BULBOUS TIP: Brand: CARDINAL HEALTH

## (undated) DEVICE — NDL HYPO ECLPS SFTY 18G 1 1/2IN

## (undated) DEVICE — EYE PAD,OVAL: Brand: CURITY

## (undated) DEVICE — ST EXT MICROBORE FIX M LL 38IN

## (undated) DEVICE — SKIN AFFIX SURG ADHESIVE 72/CS 0.55ML: Brand: MEDLINE

## (undated) DEVICE — DRSNG SURESITE WNDW 4X4.5

## (undated) DEVICE — DRSNG GZ PETROLTM XEROFORM CURAD 1X8IN STRL